# Patient Record
Sex: MALE | URBAN - METROPOLITAN AREA
[De-identification: names, ages, dates, MRNs, and addresses within clinical notes are randomized per-mention and may not be internally consistent; named-entity substitution may affect disease eponyms.]

---

## 2024-10-30 ENCOUNTER — HOSPITAL ENCOUNTER (INPATIENT)
Age: 29
LOS: 1 days | Discharge: ANOTHER HOSPITAL | DRG: 947 | End: 2024-10-31
Attending: STUDENT IN AN ORGANIZED HEALTH CARE EDUCATION/TRAINING PROGRAM | Admitting: INTERNAL MEDICINE
Payer: COMMERCIAL

## 2024-10-30 ENCOUNTER — CLINICAL DOCUMENTATION (OUTPATIENT)
Dept: PHYSICAL THERAPY | Age: 29
End: 2024-10-30

## 2024-10-30 ENCOUNTER — CLINICAL DOCUMENTATION (OUTPATIENT)
Dept: REHABILITATION | Age: 29
End: 2024-10-30

## 2024-10-30 PROBLEM — A41.9 SHOCK, SEPTIC (HCC): Status: ACTIVE | Noted: 2024-10-30

## 2024-10-30 PROBLEM — R65.21 SHOCK, SEPTIC (HCC): Status: ACTIVE | Noted: 2024-10-30

## 2024-10-30 LAB
CREAT SERPL-MCNC: 1 MG/DL (ref 0.9–1.3)
GFR SERPLBLD CREATININE-BSD FMLA CKD-EPI: >90 ML/MIN/{1.73_M2}

## 2024-10-30 PROCEDURE — 6370000000 HC RX 637 (ALT 250 FOR IP): Performed by: STUDENT IN AN ORGANIZED HEALTH CARE EDUCATION/TRAINING PROGRAM

## 2024-10-30 PROCEDURE — G0378 HOSPITAL OBSERVATION PER HR: HCPCS

## 2024-10-30 PROCEDURE — 36415 COLL VENOUS BLD VENIPUNCTURE: CPT

## 2024-10-30 PROCEDURE — 82565 ASSAY OF CREATININE: CPT

## 2024-10-30 PROCEDURE — 1280000000 HC REHAB R&B

## 2024-10-30 PROCEDURE — 93005 ELECTROCARDIOGRAM TRACING: CPT | Performed by: STUDENT IN AN ORGANIZED HEALTH CARE EDUCATION/TRAINING PROGRAM

## 2024-10-30 RX ORDER — ZOLPIDEM TARTRATE 5 MG/1
5 TABLET ORAL NIGHTLY PRN
Status: ON HOLD | COMMUNITY
End: 2024-10-31 | Stop reason: CLARIF

## 2024-10-30 RX ORDER — ESCITALOPRAM OXALATE 20 MG/1
20 TABLET ORAL DAILY
COMMUNITY

## 2024-10-30 RX ORDER — ENOXAPARIN SODIUM 100 MG/ML
40 INJECTION SUBCUTANEOUS DAILY
Status: DISCONTINUED | OUTPATIENT
Start: 2024-10-31 | End: 2024-10-31 | Stop reason: HOSPADM

## 2024-10-30 RX ORDER — FOLIC ACID 1 MG/1
1 TABLET ORAL DAILY
Status: DISCONTINUED | OUTPATIENT
Start: 2024-10-31 | End: 2024-10-31 | Stop reason: HOSPADM

## 2024-10-30 RX ORDER — PROPRANOLOL HCL 20 MG
10 TABLET ORAL 2 TIMES DAILY
Status: DISCONTINUED | OUTPATIENT
Start: 2024-10-30 | End: 2024-10-31 | Stop reason: HOSPADM

## 2024-10-30 RX ORDER — ACETAMINOPHEN 325 MG/1
650 TABLET ORAL EVERY 4 HOURS PRN
Status: DISCONTINUED | OUTPATIENT
Start: 2024-10-30 | End: 2024-10-31 | Stop reason: HOSPADM

## 2024-10-30 RX ORDER — BUSPIRONE HYDROCHLORIDE 5 MG/1
5 TABLET ORAL 3 TIMES DAILY
Status: DISCONTINUED | OUTPATIENT
Start: 2024-10-30 | End: 2024-10-31 | Stop reason: HOSPADM

## 2024-10-30 RX ORDER — SENNA AND DOCUSATE SODIUM 50; 8.6 MG/1; MG/1
2 TABLET, FILM COATED ORAL NIGHTLY
Status: DISCONTINUED | OUTPATIENT
Start: 2024-10-30 | End: 2024-10-31 | Stop reason: HOSPADM

## 2024-10-30 RX ORDER — MULTIVITAMIN WITH IRON
1 TABLET ORAL DAILY
Status: DISCONTINUED | OUTPATIENT
Start: 2024-10-31 | End: 2024-10-31 | Stop reason: HOSPADM

## 2024-10-30 RX ORDER — SIMETHICONE 80 MG
80 TABLET,CHEWABLE ORAL EVERY 6 HOURS PRN
Status: DISCONTINUED | OUTPATIENT
Start: 2024-10-30 | End: 2024-10-31 | Stop reason: HOSPADM

## 2024-10-30 RX ORDER — ESCITALOPRAM OXALATE 10 MG/1
20 TABLET ORAL DAILY
Status: DISCONTINUED | OUTPATIENT
Start: 2024-10-31 | End: 2024-10-31 | Stop reason: HOSPADM

## 2024-10-30 RX ORDER — VALSARTAN 80 MG/1
40 TABLET ORAL DAILY
Status: DISCONTINUED | OUTPATIENT
Start: 2024-10-31 | End: 2024-10-31 | Stop reason: HOSPADM

## 2024-10-30 RX ORDER — BISACODYL 10 MG
10 SUPPOSITORY, RECTAL RECTAL DAILY PRN
Status: DISCONTINUED | OUTPATIENT
Start: 2024-10-30 | End: 2024-10-31 | Stop reason: HOSPADM

## 2024-10-30 RX ORDER — VALSARTAN 80 MG/1
80 TABLET ORAL DAILY
Status: ON HOLD | COMMUNITY
End: 2024-10-31 | Stop reason: CLARIF

## 2024-10-30 RX ORDER — POLYETHYLENE GLYCOL 3350 17 G/17G
17 POWDER, FOR SOLUTION ORAL DAILY
Status: DISCONTINUED | OUTPATIENT
Start: 2024-10-30 | End: 2024-10-31 | Stop reason: HOSPADM

## 2024-10-30 RX ORDER — ACETAMINOPHEN 650 MG
TABLET, EXTENDED RELEASE ORAL PRN
Status: DISCONTINUED | OUTPATIENT
Start: 2024-10-30 | End: 2024-10-31 | Stop reason: HOSPADM

## 2024-10-30 RX ORDER — LANOLIN ALCOHOL/MO/W.PET/CERES
100 CREAM (GRAM) TOPICAL DAILY
Status: DISCONTINUED | OUTPATIENT
Start: 2024-10-31 | End: 2024-10-31 | Stop reason: HOSPADM

## 2024-10-30 RX ADMIN — BUSPIRONE HYDROCHLORIDE 5 MG: 5 TABLET ORAL at 19:49

## 2024-10-30 RX ADMIN — PROPRANOLOL HYDROCHLORIDE 10 MG: 20 TABLET ORAL at 20:57

## 2024-10-30 RX ADMIN — SENNOSIDES, DOCUSATE SODIUM 2 TABLET: 50; 8.6 TABLET, FILM COATED ORAL at 19:49

## 2024-10-30 RX ADMIN — BUSPIRONE HYDROCHLORIDE 5 MG: 5 TABLET ORAL at 18:21

## 2024-10-30 NOTE — PROGRESS NOTES
4 Eyes Skin Assessment     NAME:  Jm Snow  YOB: 1995  MEDICAL RECORD NUMBER:  [id number]    The patient is being assessed for  Admission    I agree that at least one RN has performed a thorough Head to Toe Skin Assessment on the patient. ALL assessment sites listed below have been assessed.      Areas assessed by both nurses:    Head, Face, Ears, Shoulders, Back, Chest, Arms, Elbows, Hands, Sacrum. Buttock, Coccyx, Ischium, and Legs. Feet and Heels        Does the Patient have a Wound? Yes wound(s) were present on assessment. LDA wound assessment was Initiated and completed by RN excoriation to ABD groin and buttock. General red rash, below R shin scab, LFA old IV site bleeding with pressure bandage on,  Dressings to R neck R chest s/p IV removal.        Naveen Prevention initiated by RN: Yes  Wound Care Orders initiated by RN: Yes    Pressure Injury (Stage 3,4, Unstageable, DTI, NWPT, and Complex wounds) if present, place Wound referral order by RN under : Yes unstageables to left foot digits 1,2,&4    New Ostomies, if present place, Ostomy referral order under : No     Nurse 1 eSignature: Electronically signed by Elena Berger RN on 10/30/24 at 5:40 PM EDT    **SHARE this note so that the co-signing nurse can place an eSignature**    Nurse 2 eSignature: Electronically signed by Abimbola Flaherty RN on 10/30/24 at 6:29 PM EDT

## 2024-10-30 NOTE — PROGRESS NOTES
Admission Period/Goal QM Codes for Jm Snow.    QM Admit Code Goal Code   Eating     Oral Hygiene     Toileting Hygiene     Shower/Bathing     UB Dressing     LB Dressing     Putting on/off Footwear     Rolling Left and Right     Sit To Lying     Lying to Sitting on Bedside     Sit to Stand     Chair/Bed to Chair Transfer     Toilet Transfers     Car Transfers     Walk 10 Feet     Walk 50 Feet with Two Turns     Walk 150 Feet     Walk 10 Feet on Uneven Surfaces     1 Step (Curb)     4 Steps     12 Steps     Picking up Object from Floor     Wheel 50 Feet with 2 Turns     Type     Wheel 150 Feet     Type         The above codes were determined by the treatment team to be the patient's accurate admission assessment codes based on assessment performed soon after the patient's admission and prior to the benefit of services provided by staff, or if appropriate, the patient's usual performance at admission.    OT:       PT:       RN:       ST:       :

## 2024-10-30 NOTE — PROGRESS NOTES
Patient was admitted to room 4904 at 1600.  Patient was oriented to the Call Light, Phone, TV, Thermostat, Bed Controls, Bathroom and Emergency Cord.  Patient verbalized and demonstrated understanding of all.  Patient was also given an overview of Unit Routines for Acute Rehab, including the patient's rights and responsibilities as well as the CMS IRF [place] Privacy Act Statement providing notice of data collection.  Patient states that their normal bowel regime is 3X's a week . Meal times were explained, including how to order food.  The white board, (which is posted on the wall by the door is used for communication) has the Therapy Scheduled that is posted each day along with the name of your doctor, nurse, and therapist for your convenience.  We recommend any family that will be care givers or any care givers the patient has, take part in therapy.  We have no set visiting hours, we suggest non-caregiver friends and family visitors come after therapy (at 4 PM or later) to allow patient to rest in between sessions.      In conjunction with the patient and patient’s family, this nurse worked to establish a tailored Fall TIPS plan to ensure patient safety and compliance:    Falls TIPS Completion    Patient identified as increased risk for harm if fall:  [x] Yes     Fall Risks  History of Falls:    [x] Yes   Medication Side Effects:   [x] Yes   Walking Aid:    [x] Yes   IV Pole or Equipment:   [] Yes   Unsteady Walk:     [x] Yes   May Forget or Choose Not to Call: [x] Yes     Fall Interventions   Communicate Recent Fall and/or Risk of Harm: [x] Yes   Walking Aids:  Crutches: [] Yes   Cane: [] Yes   Walker: [] Yes   IV Assistance When Walking: [] Yes   Toileting Schedule: Every 2 Hours  Bedpan:   [x] Yes   Assist to Commode: [] Yes   Assist to Bathroom: [] Yes   Bed Alarm On: [x] Yes   Assistance Out of Bed:  Bedrest: [] Yes   1 Person: [] Yes   2 People: [x] Yes

## 2024-10-30 NOTE — PLAN OF CARE
ARU PATIENT TREATMENT PLAN  [address] [address]  [phone]      Jm Snow    : 1995  [facility] #: [id number]  MRN: [id number]  PHYSICIAN:  Chinedu Rosas MD  Primary Problem    Patient Active Problem List   Diagnosis    Shock, septic (HCC)       Rehabilitation Diagnosis:  ***    ADMIT DATE:10/30/2024    Patient Goals: ***  Admitting Impairments: ***  Activities: ***  Participation: Prior to admission patient was living at home ***   CARE PLAN     NURSING:  Jm Sanderstner while on this unit will:  [x] Be continent of bowel and bladder     [x] Have an adequate number of bowel movements  [x] Urinate with no urinary retention >300ml in bladder  [] Complete bladder protocol with martinez removal  [] Maintain O2 SATs at ___%  [x] Have pain managed while on ARU       [x] Be pain free by discharge   [x] Have no skin breakdown while on ARU  [x] Have improved skin integrity via wound measurements  [x] Have no signs/symptoms of infection at the wound site  [x] Be free from injury during hospitalization   [x] Complete education with patient/family with understanding demonstrated for:  [] Adjustment   [] Other:     Nursing Interventions will include:  [x] bowel/bladder training   [x] education for medical assistive devices   [x] medication education   [] O2 saturation management   [x] energy conservation   [x] stress management techniques   [x] fall prevention   [x] alarms protocol   [x] seating and positioning   [x] skin/wound care   [x] pressure relief instruction   [x] dressing changes     [x] infection protection   [x] DVT prophylaxis  [x] assistance with in room safety with transfers to bed, toilet, wheelchair, shower   [x] bathroom activities and hygiene  [] Other:    Patient/Caregiver Education for:  [x] Disease/sustained injury/management     [x] Medication Use  [x] Surgical intervention  [x] Safety  [x] Body mechanics and or joint protection  [x] Health  on dietary upgrades.  Neuropsychology/Psychology may evaluate and provide necessary support.    Medical issues being managed closely and that require 24 hour availability of a physician:  [] Swallowing Precautions  [] Bowel/Bladder Fx  [] Weight bearing precautions  [] Wound Care    [] Pain Mgmt   [] Infection Protection  [] DVT Prophylaxis   [] Fall Precautions  [] Fluid/Electrolyte/Nutrition Balance  [] Voice Protection   [] Respiratory  [] Other:    Medical Prognosis: [] Good  [] Fair    [] Guarded   Total expected IRF days: ***  Anticipated discharge destination: ***  [] Home Independently   [] Home with supervision    []SNF     [] Other                                           Physician anticipated functional outcomes:  By discharge, the patient will progress to being ***   IPOC brief synthesis: ***      I have reviewed this initial plan of care and agree with its contents:    Title   Name    Date    Time    Physician:     Case Mgmt:    OT:     PT:    RN: Elena Berger RN 10/30/24 5479    ST:    :    Other:

## 2024-10-30 NOTE — PROGRESS NOTES
ARU Admission Assessment    Ethnicity  \"Are you of , /a, or Austrian origin?\"  Check all that apply:  [x] A.  No, not of , /a, or Austrian Origin  [] B.  Yes, Palestinian, Palestinian American, Chicano/a  [] C.  Yes, Gambian  [] D.  Yes, Ervin  [] E.  Yes, another , , or Austrian origin  [] X.  Patient unable to respond  [] Y.  Patient declines to respond    Race  \"What is your race?\"  Check all that apply:  [x] A.  White  [] B.  Black or   [] C.   or   [] D.   Lebanese  [] E.  Chinese  [] F.  Comoran  [] G. Omani  [] H.  Luxembourgish  [] I.  English  [] J.  Other   [] K.    [] L.  Citizen of [place] or [place]  [] M.  Argentine  [] N.  Other   [] X.  Patient unable to respond  [] Y.  Patient declines to respond  [] Z.  None of the above    Language  A.  \"What is your preferred language?\"   English    B.  \"Do you need or want an  to communicate with a doctor or health care staff?\"  Check only one:  [x] 0.  No  [] 1.  Yes  [] 9.  Unable to determine    Transportation  \"Has lack of transportation kept you from medical appointments, meetings, work, or from getting things needed for daily living?\"Check all that apply:  [] A.  Yes, it has kept me from medical appointments or from getting my medications  [] B.  Yes, it has kept me from non-medical meetings, appointments, work, or from getting things that I need  [x] C.  No  [] X.  Patient unable to respond  [] Y.  Patient declines to respond    Hearing  Ability to hear (with hearing aid or hearing appliances if normally used)  [x]  0.  Adequate - no difficulty in normal conversation, social interaction, listening to TV  []  1.  Minimal difficulty - difficulty in some environments (e.g. when person speaks softly or setting is noisy)  []  2.  Moderate difficulty - speaker has to increase volume and speak distinctly   []  3.  Highly impaired - absence of  useful hearing    Vision  Ability to see in adequate light (with glasses or other visual appliances)  [x]  0.  Adequate - sees fine detail, such as regular print in newspapers/books  []  1.  Impaired - sees large print, but not regular print in newspapers/books  []  2.  Moderately impaired - limited vision; not able to see newspaper headlines but can identify objects  []  3.  Highly impaired - object identification in question, but eyes appear to follow objects  []  4.  Severely impaired - no vision or sees only light, colors, or shapes; eyes do not appear to follow objects    Health Literacy  \"How often do you need to have someone help you when you read instructions, pamphlets, or other written material from your doctor or pharmacy?\"  [x]  0.  Never  []  1.  Rarely  []  2.  Sometimes  []  3.  Often  []  4.  Always  []  7.  Patient declines to respond  []  8.  Patient unable to respond    BIMS - **Must be completed in the flowsheet at admission prior to proceeding with Delirium Assessment**  [x] BIMS completed in flowsheet at admission  [] BIMS not completed due to patient unable to give appropriate related answers, see Staff Assessment in flowsheet    Signs and Symptoms of Delirium  A.  Acute Onset Mental Status Change - Is there evidence of an acute change in mental status from the patient's baseline?  [x] 0.  No  [] 1.  Yes    B.  Inattention - Did the patient have difficulty focusing attention, for example being easily distractible or having difficulty keeping track of what was being said?  [x]  0.  Behavior not present  []  1.  Behavior continuously present, does not fluctuate  []  2.  Behavior present, fluctuates (comes and goes, changes in severity)    C.  Disorganized thinking - Was the patient's thinking disorganized or incoherent (rambling or irrelevant conversation, unclear or illogical flow of ideas, or unpredictable switching from subject to subject)?  [x]  0.  Behavior not present  []  1.  Behavior  the above    High Risk Drug Classes:  Use and Indication    Is taking: Check if the pt is taking any medications by pharmacological classification, not how it is used, in the following classes  Indication noted: If column 1 is checked, check if there is an indication noted for all meds in the drug class Is taking  (check all that apply) Indication noted (check all that apply)   Antipsychotic [] []   Anticoagulant [x] [x]   Antibiotic [] []   Opioid [] []   Antiplatelet [] []   Hypoglycemic (including insulin) [] []   None of the above []     Special Treatments, Procedures, and Programs    Check all of the following treatments, procedures, and programs that apply on admission. On admission (check all that apply)   Cancer Treatments   A1. Chemotherapy []           A2. IV []           A3. Oral []           A10. Other []   B1. Radiation []   Respiratory Therapies   C1. Oxygen Therapy []           C2. Continuous (continuously for at least 14 hours per day) []           C3. Intermittent []           C4. High-concentration []   D1. Suctioning (Does not include oral suctioning) []           D2. Scheduled []           D3. As needed []   E1. Tracheostomy Care []   F1. Invasive Mechanical Ventilator (ventilator or respirator) []   G1. Non-invasive Mechanical Ventilator []           G2. BiPAP []           G3. CPAP []   Other   H1. IV Medications (Do not include sub Q pumps, flushes, Dextrose 50% or lactated ringers) []           H2. Vasoactive medications []           H3. Antibiotics []           H4. Anticoagulation []           H10. Other []   I1. Transfusions []   J1. Dialysis []           J2. Hemodialysis []           J3. Peritoneal dialysis []   O1. IV access (including a catheter in a vein) []           O2. Peripheral []           O3. Midline []           O4. Central (PICC, tunneled, port) []      None of the above (select if no Cancer, Respiratory, or Other boxes are checked) [x]     The above items have been reviewed and  updated as necessary, and are accurate for the admission assessment period.    Assessing/Reviewing RN:Elena Berger RN 10/30/24    Assessing/Reviewing RN: Karian Garcia

## 2024-10-31 ENCOUNTER — HOSPITAL ENCOUNTER (INPATIENT)
Age: 29
LOS: 7 days | Discharge: NURSING, REHAB OR LONG-TERM CARE | DRG: 871 | End: 2024-11-07
Attending: INTERNAL MEDICINE | Admitting: INTERNAL MEDICINE
Payer: COMMERCIAL

## 2024-10-31 ENCOUNTER — APPOINTMENT (OUTPATIENT)
Age: 29
DRG: 871 | End: 2024-10-31
Attending: INTERNAL MEDICINE
Payer: COMMERCIAL

## 2024-10-31 ENCOUNTER — APPOINTMENT (OUTPATIENT)
Dept: GENERAL RADIOLOGY | Age: 29
DRG: 871 | End: 2024-10-31
Attending: INTERNAL MEDICINE
Payer: COMMERCIAL

## 2024-10-31 ENCOUNTER — APPOINTMENT (OUTPATIENT)
Dept: CT IMAGING | Age: 29
DRG: 871 | End: 2024-10-31
Attending: INTERNAL MEDICINE
Payer: COMMERCIAL

## 2024-10-31 ENCOUNTER — APPOINTMENT (OUTPATIENT)
Dept: VASCULAR LAB | Age: 29
DRG: 871 | End: 2024-10-31
Attending: INTERNAL MEDICINE
Payer: COMMERCIAL

## 2024-10-31 ENCOUNTER — APPOINTMENT (OUTPATIENT)
Dept: GENERAL RADIOLOGY | Age: 29
DRG: 947 | End: 2024-10-31
Attending: STUDENT IN AN ORGANIZED HEALTH CARE EDUCATION/TRAINING PROGRAM
Payer: COMMERCIAL

## 2024-10-31 ENCOUNTER — CLINICAL DOCUMENTATION (OUTPATIENT)
Dept: PHYSICAL THERAPY | Age: 29
End: 2024-10-31

## 2024-10-31 VITALS
RESPIRATION RATE: 20 BRPM | HEIGHT: 76 IN | HEART RATE: 115 BPM | WEIGHT: 248.68 LBS | OXYGEN SATURATION: 93 % | BODY MASS INDEX: 30.28 KG/M2 | DIASTOLIC BLOOD PRESSURE: 79 MMHG | TEMPERATURE: 101.9 F | SYSTOLIC BLOOD PRESSURE: 125 MMHG

## 2024-10-31 DIAGNOSIS — M79.89 SWELLING OF LOWER EXTREMITY: ICD-10-CM

## 2024-10-31 DIAGNOSIS — R06.02 SHORTNESS OF BREATH: Primary | ICD-10-CM

## 2024-10-31 PROBLEM — E66.3 OVERWEIGHT (BMI 25.0-29.9): Status: ACTIVE | Noted: 2024-10-31

## 2024-10-31 PROBLEM — F14.10 COCAINE ABUSE (HCC): Status: ACTIVE | Noted: 2024-10-31

## 2024-10-31 PROBLEM — A41.9 SEPSIS (HCC): Status: ACTIVE | Noted: 2024-10-31

## 2024-10-31 PROBLEM — J96.01 ACUTE RESPIRATORY FAILURE WITH HYPOXIA: Status: ACTIVE | Noted: 2024-10-31

## 2024-10-31 PROBLEM — M87.059 AVASCULAR NECROSIS OF BONE OF HIP: Status: ACTIVE | Noted: 2024-10-31

## 2024-10-31 PROBLEM — F10.21 HISTORY OF ALCOHOLISM (HCC): Status: ACTIVE | Noted: 2024-10-31

## 2024-10-31 PROBLEM — J18.9 MULTIFOCAL PNEUMONIA: Status: ACTIVE | Noted: 2024-10-31

## 2024-10-31 LAB
ALBUMIN SERPL-MCNC: 2.6 G/DL (ref 3.4–5)
ALBUMIN/GLOB SERPL: 1 {RATIO} (ref 1.1–2.2)
ALP SERPL-CCNC: 106 U/L (ref 40–129)
ALT SERPL-CCNC: 23 U/L (ref 10–40)
ANION GAP SERPL CALCULATED.3IONS-SCNC: 14 MMOL/L (ref 3–16)
ANION GAP SERPL CALCULATED.3IONS-SCNC: 14 MMOL/L (ref 3–16)
ANTI-XA UNFRAC HEPARIN: <0.1 IU/ML (ref 0.3–0.7)
ANTI-XA UNFRAC HEPARIN: <0.1 IU/ML (ref 0.3–0.7)
APTT BLD: 30 SEC (ref 22.1–36.4)
AST SERPL-CCNC: 27 U/L (ref 15–37)
BACTERIA URNS QL MICRO: ABNORMAL /HPF
BASE EXCESS BLDV CALC-SCNC: -1 MMOL/L (ref -3–3)
BASE EXCESS BLDV CALC-SCNC: 0.6 MMOL/L (ref -3–3)
BASOPHILS # BLD: 0 K/UL (ref 0–0.2)
BASOPHILS NFR BLD: 1 %
BILIRUB SERPL-MCNC: 0.6 MG/DL (ref 0–1)
BILIRUB UR QL STRIP.AUTO: NEGATIVE
BUN SERPL-MCNC: 22 MG/DL (ref 7–20)
BUN SERPL-MCNC: 22 MG/DL (ref 7–20)
C DIFF TOX A+B STL QL IA: NORMAL
CALCIUM SERPL-MCNC: 7.6 MG/DL (ref 8.3–10.6)
CALCIUM SERPL-MCNC: 7.9 MG/DL (ref 8.3–10.6)
CHLORIDE SERPL-SCNC: 104 MMOL/L (ref 99–110)
CHLORIDE SERPL-SCNC: 105 MMOL/L (ref 99–110)
CLARITY UR: CLEAR
CO2 BLDV-SCNC: 49 MMOL/L
CO2 BLDV-SCNC: 53 MMOL/L
CO2 SERPL-SCNC: 20 MMOL/L (ref 21–32)
CO2 SERPL-SCNC: 22 MMOL/L (ref 21–32)
COHGB MFR BLDV: 4.4 % (ref 0–1.5)
COHGB MFR BLDV: 5.3 % (ref 0–1.5)
COLOR UR: YELLOW
CREAT SERPL-MCNC: 0.9 MG/DL (ref 0.9–1.3)
CREAT SERPL-MCNC: 1 MG/DL (ref 0.9–1.3)
CRP SERPL-MCNC: 72.7 MG/L (ref 0–5.1)
DEPRECATED RDW RBC AUTO: 15.5 % (ref 12.4–15.4)
DEPRECATED RDW RBC AUTO: 15.9 % (ref 12.4–15.4)
EOSINOPHIL # BLD: 0.3 K/UL (ref 0–0.6)
EOSINOPHIL NFR BLD: 10.3 %
EPI CELLS #/AREA URNS AUTO: 3 /HPF (ref 0–5)
ERYTHROCYTE [SEDIMENTATION RATE] IN BLOOD BY WESTERGREN METHOD: 20 MM/HR (ref 0–15)
GFR SERPLBLD CREATININE-BSD FMLA CKD-EPI: >90 ML/MIN/{1.73_M2}
GFR SERPLBLD CREATININE-BSD FMLA CKD-EPI: >90 ML/MIN/{1.73_M2}
GLUCOSE BLD-MCNC: 102 MG/DL (ref 70–99)
GLUCOSE BLD-MCNC: 96 MG/DL (ref 70–99)
GLUCOSE SERPL-MCNC: 80 MG/DL (ref 70–99)
GLUCOSE SERPL-MCNC: 89 MG/DL (ref 70–99)
GLUCOSE UR STRIP.AUTO-MCNC: NEGATIVE MG/DL
HCO3 BLDV-SCNC: 20.9 MMOL/L (ref 23–29)
HCO3 BLDV-SCNC: 22.9 MMOL/L (ref 23–29)
HCT VFR BLD AUTO: 25 % (ref 40.5–52.5)
HCT VFR BLD AUTO: 29 % (ref 40.5–52.5)
HGB BLD-MCNC: 7.8 G/DL (ref 13.5–17.5)
HGB BLD-MCNC: 9 G/DL (ref 13.5–17.5)
HGB UR QL STRIP.AUTO: NEGATIVE
HYALINE CASTS #/AREA URNS AUTO: 11 /LPF (ref 0–8)
HYALINE CASTS: PRESENT
INR PPP: 1.27 (ref 0.85–1.15)
KETONES UR STRIP.AUTO-MCNC: ABNORMAL MG/DL
LACTATE BLDV-SCNC: 0.8 MMOL/L (ref 0.4–2)
LACTATE BLDV-SCNC: 2.1 MMOL/L (ref 0.4–2)
LEUKOCYTE ESTERASE UR QL STRIP.AUTO: ABNORMAL
LYMPHOCYTES # BLD: 0.4 K/UL (ref 1–5.1)
LYMPHOCYTES NFR BLD: 16.4 %
MAGNESIUM SERPL-MCNC: 1.44 MG/DL (ref 1.8–2.4)
MCH RBC QN AUTO: 29.2 PG (ref 26–34)
MCH RBC QN AUTO: 29.3 PG (ref 26–34)
MCHC RBC AUTO-ENTMCNC: 31.1 G/DL (ref 31–36)
MCHC RBC AUTO-ENTMCNC: 31.3 G/DL (ref 31–36)
MCV RBC AUTO: 93.7 FL (ref 80–100)
MCV RBC AUTO: 94.1 FL (ref 80–100)
METHGB MFR BLDV: 0.4 %
METHGB MFR BLDV: 0.9 %
MONOCYTES # BLD: 0.4 K/UL (ref 0–1.3)
MONOCYTES NFR BLD: 14 %
MRSA DNA SPEC QL NAA+PROBE: NORMAL
NEUTROPHILS # BLD: 1.5 K/UL (ref 1.7–7.7)
NEUTROPHILS NFR BLD: 58.3 %
NITRITE UR QL STRIP.AUTO: NEGATIVE
NT-PROBNP SERPL-MCNC: 520 PG/ML (ref 0–124)
O2 CT VFR BLDV CALC: 11 VOL %
O2 CT VFR BLDV CALC: 12 VOL %
O2 THERAPY: ABNORMAL
O2 THERAPY: ABNORMAL
PCO2 BLDV: 24.9 MMHG (ref 40–50)
PCO2 BLDV: 27.4 MMHG (ref 40–50)
PERFORMED ON: ABNORMAL
PERFORMED ON: NORMAL
PH BLDV: 7.53 [PH] (ref 7.35–7.45)
PH BLDV: 7.53 [PH] (ref 7.35–7.45)
PH UR STRIP.AUTO: 5.5 [PH] (ref 5–8)
PHOSPHATE SERPL-MCNC: 3.9 MG/DL (ref 2.5–4.9)
PLATELET # BLD AUTO: 219 K/UL (ref 135–450)
PLATELET # BLD AUTO: 304 K/UL (ref 135–450)
PMV BLD AUTO: 8.9 FL (ref 5–10.5)
PMV BLD AUTO: 9.2 FL (ref 5–10.5)
PO2 BLDV: 140 MMHG (ref 25–40)
PO2 BLDV: 172 MMHG (ref 25–40)
POTASSIUM SERPL-SCNC: 3.1 MMOL/L (ref 3.5–5.1)
POTASSIUM SERPL-SCNC: 3.4 MMOL/L (ref 3.5–5.1)
PROCALCITONIN SERPL IA-MCNC: 0.92 NG/ML (ref 0–0.15)
PROT SERPL-MCNC: 5.3 G/DL (ref 6.4–8.2)
PROT UR STRIP.AUTO-MCNC: ABNORMAL MG/DL
PROTHROMBIN TIME: 16.1 SEC (ref 11.9–14.9)
RBC # BLD AUTO: 2.67 M/UL (ref 4.2–5.9)
RBC # BLD AUTO: 3.09 M/UL (ref 4.2–5.9)
RBC CLUMPS #/AREA URNS AUTO: 2 /HPF (ref 0–4)
SAO2 % BLDV: 100 %
SAO2 % BLDV: 100 %
SODIUM SERPL-SCNC: 139 MMOL/L (ref 136–145)
SODIUM SERPL-SCNC: 140 MMOL/L (ref 136–145)
SP GR UR STRIP.AUTO: 1.01 (ref 1–1.03)
T3 SERPL-MCNC: 0.72 NG/ML (ref 0.8–2)
T4 FREE SERPL-MCNC: 1 NG/DL (ref 0.9–1.8)
T4 SERPL-MCNC: 6.1 UG/DL (ref 4.5–10.9)
TROPONIN, HIGH SENSITIVITY: 91 NG/L (ref 0–22)
TROPONIN, HIGH SENSITIVITY: 94 NG/L (ref 0–22)
TSH SERPL DL<=0.005 MIU/L-ACNC: 12.3 UIU/ML (ref 0.27–4.2)
UA COMPLETE W REFLEX CULTURE PNL UR: ABNORMAL
UA DIPSTICK W REFLEX MICRO PNL UR: YES
URN SPEC COLLECT METH UR: ABNORMAL
UROBILINOGEN UR STRIP-ACNC: 0.2 E.U./DL
VANCOMYCIN SERPL-MCNC: 9.7 UG/ML
WBC # BLD AUTO: 2.6 K/UL (ref 4–11)
WBC # BLD AUTO: 6.1 K/UL (ref 4–11)
WBC #/AREA URNS AUTO: 6 /HPF (ref 0–5)

## 2024-10-31 PROCEDURE — 85610 PROTHROMBIN TIME: CPT

## 2024-10-31 PROCEDURE — 2000000000 HC ICU R&B

## 2024-10-31 PROCEDURE — 80053 COMPREHEN METABOLIC PANEL: CPT

## 2024-10-31 PROCEDURE — 2580000003 HC RX 258: Performed by: INTERNAL MEDICINE

## 2024-10-31 PROCEDURE — 82803 BLOOD GASES ANY COMBINATION: CPT

## 2024-10-31 PROCEDURE — 71045 X-RAY EXAM CHEST 1 VIEW: CPT

## 2024-10-31 PROCEDURE — 6360000002 HC RX W HCPCS: Performed by: INTERNAL MEDICINE

## 2024-10-31 PROCEDURE — 5A0945A ASSISTANCE WITH RESPIRATORY VENTILATION, 24-96 CONSECUTIVE HOURS, HIGH NASAL FLOW/VELOCITY: ICD-10-PCS | Performed by: STUDENT IN AN ORGANIZED HEALTH CARE EDUCATION/TRAINING PROGRAM

## 2024-10-31 PROCEDURE — 87324 CLOSTRIDIUM AG IA: CPT

## 2024-10-31 PROCEDURE — 85027 COMPLETE CBC AUTOMATED: CPT

## 2024-10-31 PROCEDURE — 99291 CRITICAL CARE FIRST HOUR: CPT | Performed by: INTERNAL MEDICINE

## 2024-10-31 PROCEDURE — 87040 BLOOD CULTURE FOR BACTERIA: CPT

## 2024-10-31 PROCEDURE — 94669 MECHANICAL CHEST WALL OSCILL: CPT

## 2024-10-31 PROCEDURE — 96368 THER/DIAG CONCURRENT INF: CPT

## 2024-10-31 PROCEDURE — 83605 ASSAY OF LACTIC ACID: CPT

## 2024-10-31 PROCEDURE — 85025 COMPLETE CBC W/AUTO DIFF WBC: CPT

## 2024-10-31 PROCEDURE — 6360000004 HC RX CONTRAST MEDICATION: Performed by: INTERNAL MEDICINE

## 2024-10-31 PROCEDURE — 74018 RADEX ABDOMEN 1 VIEW: CPT

## 2024-10-31 PROCEDURE — 0202U NFCT DS 22 TRGT SARS-COV-2: CPT

## 2024-10-31 PROCEDURE — 96367 TX/PROPH/DG ADDL SEQ IV INF: CPT

## 2024-10-31 PROCEDURE — 71260 CT THORAX DX C+: CPT

## 2024-10-31 PROCEDURE — G0378 HOSPITAL OBSERVATION PER HR: HCPCS

## 2024-10-31 PROCEDURE — 83735 ASSAY OF MAGNESIUM: CPT

## 2024-10-31 PROCEDURE — 84480 ASSAY TRIIODOTHYRONINE (T3): CPT

## 2024-10-31 PROCEDURE — 51798 US URINE CAPACITY MEASURE: CPT

## 2024-10-31 PROCEDURE — 86140 C-REACTIVE PROTEIN: CPT

## 2024-10-31 PROCEDURE — 85730 THROMBOPLASTIN TIME PARTIAL: CPT

## 2024-10-31 PROCEDURE — 0D9670Z DRAINAGE OF STOMACH WITH DRAINAGE DEVICE, VIA NATURAL OR ARTIFICIAL OPENING: ICD-10-PCS | Performed by: STUDENT IN AN ORGANIZED HEALTH CARE EDUCATION/TRAINING PROGRAM

## 2024-10-31 PROCEDURE — 94761 N-INVAS EAR/PLS OXIMETRY MLT: CPT

## 2024-10-31 PROCEDURE — 84145 PROCALCITONIN (PCT): CPT

## 2024-10-31 PROCEDURE — 85520 HEPARIN ASSAY: CPT

## 2024-10-31 PROCEDURE — 36415 COLL VENOUS BLD VENIPUNCTURE: CPT

## 2024-10-31 PROCEDURE — 81001 URINALYSIS AUTO W/SCOPE: CPT

## 2024-10-31 PROCEDURE — 96366 THER/PROPH/DIAG IV INF ADDON: CPT

## 2024-10-31 PROCEDURE — 84443 ASSAY THYROID STIM HORMONE: CPT

## 2024-10-31 PROCEDURE — 2700000000 HC OXYGEN THERAPY PER DAY

## 2024-10-31 PROCEDURE — 84484 ASSAY OF TROPONIN QUANT: CPT

## 2024-10-31 PROCEDURE — 74177 CT ABD & PELVIS W/CONTRAST: CPT

## 2024-10-31 PROCEDURE — 84439 ASSAY OF FREE THYROXINE: CPT

## 2024-10-31 PROCEDURE — 6370000000 HC RX 637 (ALT 250 FOR IP): Performed by: INTERNAL MEDICINE

## 2024-10-31 PROCEDURE — 6360000002 HC RX W HCPCS: Performed by: STUDENT IN AN ORGANIZED HEALTH CARE EDUCATION/TRAINING PROGRAM

## 2024-10-31 PROCEDURE — 96365 THER/PROPH/DIAG IV INF INIT: CPT

## 2024-10-31 PROCEDURE — 83880 ASSAY OF NATRIURETIC PEPTIDE: CPT

## 2024-10-31 PROCEDURE — 87081 CULTURE SCREEN ONLY: CPT

## 2024-10-31 PROCEDURE — 84100 ASSAY OF PHOSPHORUS: CPT

## 2024-10-31 PROCEDURE — 87103 BLOOD FUNGUS CULTURE: CPT

## 2024-10-31 PROCEDURE — 96375 TX/PRO/DX INJ NEW DRUG ADDON: CPT

## 2024-10-31 PROCEDURE — 80202 ASSAY OF VANCOMYCIN: CPT

## 2024-10-31 PROCEDURE — 85652 RBC SED RATE AUTOMATED: CPT

## 2024-10-31 PROCEDURE — 87641 MR-STAPH DNA AMP PROBE: CPT

## 2024-10-31 PROCEDURE — 87449 NOS EACH ORGANISM AG IA: CPT

## 2024-10-31 PROCEDURE — 93970 EXTREMITY STUDY: CPT

## 2024-10-31 RX ORDER — POLYETHYLENE GLYCOL 3350 17 G/17G
17 POWDER, FOR SOLUTION ORAL 2 TIMES DAILY
Status: ON HOLD | COMMUNITY
End: 2024-11-22 | Stop reason: HOSPADM

## 2024-10-31 RX ORDER — ESCITALOPRAM OXALATE 10 MG/1
20 TABLET ORAL DAILY
Status: DISCONTINUED | OUTPATIENT
Start: 2024-10-31 | End: 2024-11-07 | Stop reason: HOSPADM

## 2024-10-31 RX ORDER — ZOLPIDEM TARTRATE 5 MG/1
5 TABLET ORAL NIGHTLY PRN
Status: DISCONTINUED | OUTPATIENT
Start: 2024-10-31 | End: 2024-11-07

## 2024-10-31 RX ORDER — HEPARIN SODIUM 1000 [USP'U]/ML
40 INJECTION, SOLUTION INTRAVENOUS; SUBCUTANEOUS PRN
Status: DISCONTINUED | OUTPATIENT
Start: 2024-10-31 | End: 2024-11-05

## 2024-10-31 RX ORDER — HEPARIN SODIUM 1000 [USP'U]/ML
80 INJECTION, SOLUTION INTRAVENOUS; SUBCUTANEOUS ONCE
Status: COMPLETED | OUTPATIENT
Start: 2024-10-31 | End: 2024-10-31

## 2024-10-31 RX ORDER — ACETAMINOPHEN 325 MG/1
650 TABLET ORAL EVERY 6 HOURS PRN
Status: DISCONTINUED | OUTPATIENT
Start: 2024-10-31 | End: 2024-11-07 | Stop reason: HOSPADM

## 2024-10-31 RX ORDER — HEPARIN SODIUM 1000 [USP'U]/ML
80 INJECTION, SOLUTION INTRAVENOUS; SUBCUTANEOUS PRN
Status: DISCONTINUED | OUTPATIENT
Start: 2024-10-31 | End: 2024-11-05

## 2024-10-31 RX ORDER — MAGNESIUM SULFATE 1 G/100ML
1000 INJECTION INTRAVENOUS
Status: DISPENSED | OUTPATIENT
Start: 2024-10-31 | End: 2024-10-31

## 2024-10-31 RX ORDER — FOLIC ACID 1 MG/1
1 TABLET ORAL DAILY
Status: ON HOLD | COMMUNITY
End: 2024-11-22

## 2024-10-31 RX ORDER — ACETAMINOPHEN 160 MG/5ML
650 LIQUID ORAL EVERY 4 HOURS PRN
Status: DISCONTINUED | OUTPATIENT
Start: 2024-10-31 | End: 2024-11-05 | Stop reason: SDUPTHER

## 2024-10-31 RX ORDER — VALSARTAN 40 MG/1
80 TABLET ORAL DAILY
Status: DISCONTINUED | OUTPATIENT
Start: 2024-10-31 | End: 2024-11-07 | Stop reason: HOSPADM

## 2024-10-31 RX ORDER — SODIUM CHLORIDE 0.9 % (FLUSH) 0.9 %
10 SYRINGE (ML) INJECTION EVERY 12 HOURS SCHEDULED
Status: DISCONTINUED | OUTPATIENT
Start: 2024-10-31 | End: 2024-11-07 | Stop reason: HOSPADM

## 2024-10-31 RX ORDER — PANTOPRAZOLE SODIUM 40 MG/10ML
40 INJECTION, POWDER, LYOPHILIZED, FOR SOLUTION INTRAVENOUS DAILY
Status: DISCONTINUED | OUTPATIENT
Start: 2024-10-31 | End: 2024-11-06

## 2024-10-31 RX ORDER — HEPARIN SODIUM 10000 [USP'U]/100ML
5-30 INJECTION, SOLUTION INTRAVENOUS CONTINUOUS
Status: DISCONTINUED | OUTPATIENT
Start: 2024-10-31 | End: 2024-11-05

## 2024-10-31 RX ORDER — 0.9 % SODIUM CHLORIDE 0.9 %
1000 INTRAVENOUS SOLUTION INTRAVENOUS ONCE
Status: DISCONTINUED | OUTPATIENT
Start: 2024-10-31 | End: 2024-11-05

## 2024-10-31 RX ORDER — M-VIT,TX,IRON,MINS/CALC/FOLIC 27MG-0.4MG
1 TABLET ORAL DAILY
COMMUNITY

## 2024-10-31 RX ORDER — METRONIDAZOLE 500 MG/100ML
500 INJECTION, SOLUTION INTRAVENOUS EVERY 8 HOURS
Status: DISCONTINUED | OUTPATIENT
Start: 2024-10-31 | End: 2024-10-31

## 2024-10-31 RX ORDER — POTASSIUM CHLORIDE 7.45 MG/ML
10 INJECTION INTRAVENOUS
Status: DISPENSED | OUTPATIENT
Start: 2024-10-31 | End: 2024-10-31

## 2024-10-31 RX ORDER — IOPAMIDOL 755 MG/ML
75 INJECTION, SOLUTION INTRAVASCULAR
Status: COMPLETED | OUTPATIENT
Start: 2024-10-31 | End: 2024-10-31

## 2024-10-31 RX ORDER — ENOXAPARIN SODIUM 100 MG/ML
40 INJECTION SUBCUTANEOUS DAILY
Status: DISCONTINUED | OUTPATIENT
Start: 2024-10-31 | End: 2024-10-31

## 2024-10-31 RX ORDER — SIMETHICONE 80 MG
80 TABLET,CHEWABLE ORAL
Status: ON HOLD | COMMUNITY
End: 2024-11-22 | Stop reason: HOSPADM

## 2024-10-31 RX ORDER — NICOTINE 21 MG/24HR
1 PATCH, TRANSDERMAL 24 HOURS TRANSDERMAL DAILY PRN
Status: DISCONTINUED | OUTPATIENT
Start: 2024-10-31 | End: 2024-11-07 | Stop reason: HOSPADM

## 2024-10-31 RX ORDER — SODIUM CHLORIDE 9 MG/ML
INJECTION, SOLUTION INTRAVENOUS PRN
Status: DISCONTINUED | OUTPATIENT
Start: 2024-10-31 | End: 2024-11-07 | Stop reason: HOSPADM

## 2024-10-31 RX ORDER — PROMETHAZINE HYDROCHLORIDE 25 MG/1
12.5 TABLET ORAL EVERY 6 HOURS PRN
Status: DISCONTINUED | OUTPATIENT
Start: 2024-10-31 | End: 2024-11-07 | Stop reason: HOSPADM

## 2024-10-31 RX ORDER — POTASSIUM CHLORIDE 7.45 MG/ML
10 INJECTION INTRAVENOUS
Status: DISCONTINUED | OUTPATIENT
Start: 2024-10-31 | End: 2024-10-31

## 2024-10-31 RX ORDER — ONDANSETRON 2 MG/ML
4 INJECTION INTRAMUSCULAR; INTRAVENOUS EVERY 6 HOURS PRN
Status: DISCONTINUED | OUTPATIENT
Start: 2024-10-31 | End: 2024-11-07 | Stop reason: HOSPADM

## 2024-10-31 RX ORDER — LEVOFLOXACIN 5 MG/ML
750 INJECTION, SOLUTION INTRAVENOUS EVERY 24 HOURS
Status: DISCONTINUED | OUTPATIENT
Start: 2024-10-31 | End: 2024-11-05

## 2024-10-31 RX ORDER — VANCOMYCIN HCL IN 5 % DEXTROSE 1.25 G/25
1250 PLASTIC BAG, INJECTION (ML) INTRAVENOUS EVERY 8 HOURS
Status: DISCONTINUED | OUTPATIENT
Start: 2024-10-31 | End: 2024-10-31

## 2024-10-31 RX ORDER — 0.9 % SODIUM CHLORIDE 0.9 %
500 INTRAVENOUS SOLUTION INTRAVENOUS ONCE
Status: DISCONTINUED | OUTPATIENT
Start: 2024-10-31 | End: 2024-10-31

## 2024-10-31 RX ORDER — LINEZOLID 2 MG/ML
600 INJECTION, SOLUTION INTRAVENOUS EVERY 12 HOURS
Status: DISCONTINUED | OUTPATIENT
Start: 2024-10-31 | End: 2024-10-31

## 2024-10-31 RX ORDER — ACETAMINOPHEN 650 MG/1
650 SUPPOSITORY RECTAL EVERY 6 HOURS PRN
Status: DISCONTINUED | OUTPATIENT
Start: 2024-10-31 | End: 2024-11-07 | Stop reason: HOSPADM

## 2024-10-31 RX ORDER — THIAMINE MONONITRATE (VIT B1) 100 MG
100 TABLET ORAL DAILY
Status: ON HOLD | COMMUNITY
End: 2024-11-22

## 2024-10-31 RX ORDER — BUSPIRONE HYDROCHLORIDE 5 MG/1
5 TABLET ORAL 3 TIMES DAILY
Status: ON HOLD | COMMUNITY
End: 2024-11-22 | Stop reason: HOSPADM

## 2024-10-31 RX ORDER — LINEZOLID 2 MG/ML
600 INJECTION, SOLUTION INTRAVENOUS EVERY 12 HOURS
Status: DISCONTINUED | OUTPATIENT
Start: 2024-10-31 | End: 2024-11-04

## 2024-10-31 RX ORDER — SODIUM CHLORIDE 0.9 % (FLUSH) 0.9 %
10 SYRINGE (ML) INJECTION PRN
Status: DISCONTINUED | OUTPATIENT
Start: 2024-10-31 | End: 2024-11-07 | Stop reason: HOSPADM

## 2024-10-31 RX ADMIN — POTASSIUM CHLORIDE 10 MEQ: 10 INJECTION, SOLUTION INTRAVENOUS at 05:29

## 2024-10-31 RX ADMIN — Medication 10 ML: at 11:34

## 2024-10-31 RX ADMIN — HEPARIN SODIUM 22 UNITS/KG/HR: 10000 INJECTION, SOLUTION INTRAVENOUS at 23:08

## 2024-10-31 RX ADMIN — PANTOPRAZOLE SODIUM 40 MG: 40 INJECTION, POWDER, FOR SOLUTION INTRAVENOUS at 14:05

## 2024-10-31 RX ADMIN — MAGNESIUM SULFATE HEPTAHYDRATE 1000 MG: 1 INJECTION, SOLUTION INTRAVENOUS at 05:37

## 2024-10-31 RX ADMIN — POTASSIUM CHLORIDE 10 MEQ: 10 INJECTION, SOLUTION INTRAVENOUS at 04:26

## 2024-10-31 RX ADMIN — HEPARIN SODIUM 8800 UNITS: 1000 INJECTION INTRAVENOUS; SUBCUTANEOUS at 18:27

## 2024-10-31 RX ADMIN — MICAFUNGIN SODIUM 100 MG: 100 INJECTION, POWDER, LYOPHILIZED, FOR SOLUTION INTRAVENOUS at 16:38

## 2024-10-31 RX ADMIN — POTASSIUM BICARBONATE 40 MEQ: 782 TABLET, EFFERVESCENT ORAL at 04:04

## 2024-10-31 RX ADMIN — SODIUM CHLORIDE: 9 INJECTION, SOLUTION INTRAVENOUS at 11:33

## 2024-10-31 RX ADMIN — SODIUM CHLORIDE 700 ML: 9 INJECTION, SOLUTION INTRAVENOUS at 04:22

## 2024-10-31 RX ADMIN — LINEZOLID 600 MG: 600 INJECTION, SOLUTION INTRAVENOUS at 14:07

## 2024-10-31 RX ADMIN — Medication 1000 ML: at 15:08

## 2024-10-31 RX ADMIN — IOPAMIDOL 75 ML: 755 INJECTION, SOLUTION INTRAVENOUS at 07:14

## 2024-10-31 RX ADMIN — POTASSIUM CHLORIDE 10 MEQ: 10 INJECTION, SOLUTION INTRAVENOUS at 06:16

## 2024-10-31 RX ADMIN — HEPARIN SODIUM 8800 UNITS: 1000 INJECTION INTRAVENOUS; SUBCUTANEOUS at 11:25

## 2024-10-31 RX ADMIN — CEFEPIME 2000 MG: 2 INJECTION, POWDER, FOR SOLUTION INTRAVENOUS at 04:44

## 2024-10-31 RX ADMIN — Medication 10 ML: at 21:04

## 2024-10-31 RX ADMIN — ACETAMINOPHEN 650 MG: 650 SOLUTION ORAL at 16:05

## 2024-10-31 RX ADMIN — ACETAMINOPHEN 650 MG: 650 SOLUTION ORAL at 20:57

## 2024-10-31 RX ADMIN — LEVOFLOXACIN 750 MG: 750 INJECTION, SOLUTION INTRAVENOUS at 17:19

## 2024-10-31 RX ADMIN — MAGNESIUM SULFATE HEPTAHYDRATE 1000 MG: 1 INJECTION, SOLUTION INTRAVENOUS at 06:35

## 2024-10-31 RX ADMIN — CEFEPIME 2000 MG: 2 INJECTION, POWDER, FOR SOLUTION INTRAVENOUS at 11:34

## 2024-10-31 RX ADMIN — HEPARIN SODIUM 18 UNITS/KG/HR: 10000 INJECTION, SOLUTION INTRAVENOUS at 11:31

## 2024-10-31 RX ADMIN — VANCOMYCIN HYDROCHLORIDE 2000 MG: 1 INJECTION, POWDER, LYOPHILIZED, FOR SOLUTION INTRAVENOUS at 04:13

## 2024-10-31 ASSESSMENT — PAIN SCALES - GENERAL
PAINLEVEL_OUTOF10: 0

## 2024-10-31 NOTE — PROGRESS NOTES
Physical/Occupational Therapy  Jm Snow  1995    PT/OT orders received, chart reviewed. Rapid response called this AM for tachycardia and hypoxia. Patient is being transferred to ICU for further medical management. Will hold therapy evaluations pending medical stability.     Bonnie Fermin PT, DPT [id number]  Yenni Caro, OTR/L [id number]

## 2024-10-31 NOTE — PROGRESS NOTES
Speech Language Pathology  HOLD    Jm Snow  1995    SLP eval and treat orders received. Attempted to see pt for dysphagia evaluation. Per discussion with RN, pt is NPO with NG for decompression and unable to be seen at this time. Will hold evaluation and attempt to follow-up as schedule allows.     Thank you,     Marty Hodges M.A., CCC-SLP [id number]  Speech-Language Pathologist

## 2024-10-31 NOTE — PROGRESS NOTES
ARU Discharge Assessment    Transportation  \"Has lack of transportation kept you from medical appointments, meetings, work, or from getting things needed for daily living?\"Check all that apply:  [] A.  Yes, it has kept me from medical appointments or from getting my medications  [] B.  Yes, it has kept me from non-medical meetings, appointments, work, or from getting things that I need  [] C.  No  [x] X.  Patient unable to respond  [] Y.  Patient declines to respond    Provision of Current Reconciled Medication List to Subsequent Provider at Discharge  [x] No, current reconciled medication list not provided to the subsequent provider.  [] Yes, current reconciled medication list provided to the subsequent provider. (**Select route of transmission below**)   [] Via Electronic Health Record   [] Via Health Information Exchange Organization  [] Verbal (e.g. in person, telephone, video conferencing)  [] Paper-based (e.g. fax, copies, printouts)   [] Other Methods (e.g. texting, email, CDs)    Provision of Current Reconciled Medication List to Patient at Discharge  [x] No, current reconciled medication list not provided to the patient, family and/or caregiver.   [] Yes, current reconciled medication list provided to the patient, family and/or caregiver.  (**Select route of transmission below**)   [] Via Electronic Health Record (e.g., electronic access to patient portal)   [] Via Health Information Exchange Organization  [] Verbal (e.g. in person, telephone, video conferencing)  [] Paper-based (e.g. fax, copies, printouts)   [] Other Methods (e.g. texting, email, CDs)    Health Literacy  \"How often do you need to have someone help you when you read instructions, pamphlets, or other written material from your doctor or pharmacy?\"  [x]  0.  Never  []  1.  Rarely  []  2.  Sometimes  []  3.  Often  []  4.  Always  []  7.  Patient declines to respond  []  8.  Patient unable to respond    BIMS - **Must be completed in the    Antipsychotic [] []   Anticoagulant [x] [x]   Antibiotic [] []   Opioid [] []   Antiplatelet [] []   Hypoglycemic (including insulin) [] []   None of the above []     COVID-19 Vaccination Status:    Is the patient's COVID-19 vaccination status up to date? No, patient is not up to date        Yes, patient is up to date [x]    []             A patient is up to date on their COVID-19 Vaccine if they have received:  1 dose of the 4840-0453 Moderna COVID-19 vaccine OR  1 dose of the [id number] Pfizer-BioNTech COVID-19 vaccine   If not up to date, the patient was offered an up to date COVID-19 vaccine and: Patient declined             Patient requested vaccine, Physician notified        Patient unable to respond    Vaccine currently unavailable, patient directed to PCP/Outpatient Pharmacy to receive vaccine           []    []      [x]    []     Special Treatments, Procedures, and Programs (THROUGHOUT LAST 3 DAYS OF STAY)    Check all of the following treatments, procedures, and programs that apply at discharge. At Discharge (check all that apply)   Cancer Treatments   A1. Chemotherapy []           A2. IV []           A3. Oral []           A10. Other []   B1. Radiation []   Respiratory Therapies   C1. Oxygen Therapy []           C2. Continuous (continuously for at least 14 hours per day) []           C3. Intermittent []           C4. High-concentration []   D1. Suctioning (Does not include oral suctioning) []           D2. Scheduled []           D3. As needed []   E1. Tracheostomy Care []   F1. Invasive Mechanical Ventilator (ventilator or respirator) []   G1. Non-invasive Mechanical Ventilator []           G2. BiPAP []           G3. CPAP []   Other   H1. IV Medications (Do not include sub Q pumps, flushes, Dextrose 50% or lactated ringers) []           H2. Vasoactive medications []           H3. Antibiotics []           H4. Anticoagulation []           H10. Other []   I1. Transfusions []   J1. Dialysis []           J2.  Hemodialysis []           J3. Peritoneal dialysis []   O1. IV access (including a catheter in a vein) []           O2. Peripheral []           O3. Midline []           O4. Central (PICC, tunneled, port) []      None of the above (select if no Cancer, Respiratory, or Other boxes are checked) [x]

## 2024-10-31 NOTE — PROGRESS NOTES
Hospitalist Progress Note    Name:  Jm Snow    /Age/Sex: 1995  (28 y.o. male)  MRN & CSN:  [id number] & [id number]    PCP: No primary care provider on file.    Date of Admission: 10/31/2024    Patient Status:  Inpatient     Chief Complaint: No chief complaint on file.      Hospital Course: Patient was admitted from ARU 10/31 due to fever and recently having been admitted to the ARU after a 5 week hospital stay. He was febrile and tachycardic and did not look good.  He went to   and there was a rapid response right about 7 am.  Patient was dropping oxygen sats and having increased 02 requirement.  He was transferred to [address] [place] and placed on 15 L of high flow oxygen.  At about 830 there was a second rapid response called this time on [address] [place] due to the patient not maintaining his oxygen saturation despite the 15 L of oxygen.  The patient was dropping his sats into the mid 80s.  After his first rapid response a CT chest abdomen pelvis was ordered which revealed bilateral pleural effusions and consolidations as well as the potential of a PE.  The results came back just at the time of the second rapid response.  Based on the patient's increased oxygen requirement we placed the patient on heated high flow oxygen 60 L a minute at 100% FiO2 and transferred him to the ICU.  Additionally with the potential for a pulmonary embolism we started the patient on a heparin drip    Subjective:  Today is:  Hospital Day: 1.  Patient seen and examined in ICU-3916/3916-01.     Patient is awake and alert he does appear to have some tremor and possibly some shivers.  He is refusing to have a Salvador catheter placed he is refusing rectal Tylenol  He continues to spike fevers on ICU        Medications:  Reviewed    Infusion Medications    sodium chloride Stopped (10/31/24 1200)    heparin (PORCINE) Infusion 18 Units/kg/hr (10/31/24 1131)     Scheduled Medications    sodium chloride flush  10 mL IntraVENous 2 times per    clearly artifactual, suggestive of small left upper lobe pulmonary embolus.      Patchy nodularity right upper lobe and right middle lobe suspicious for   pneumonia      Bilateral pleural effusions with adjacent consolidation of the lung bases.   Basilar lung consolidation is either due to atelectasis or pneumonia      Sclerosis of the humeral heads bilaterally suggesting avascular necrosis.      Abdomen and pelvis      Scattered fluid-filled loops of small and large bowel are seen.  No definite   transition point to suggest obstruction.  There is mild abdominal and pelvic   ascites seen, which may relate to fluid overload given the body wall anasarca.      There is suspected avascular necrosis of the femoral heads bilaterally and a   subchondral fracture on the left.      RECOMMENDATIONS:   Results discussed with Dr Steen by Sanjay Qiu MD at 8:03 am on   10/31/2024                 Assessment/Plan:    Active Hospital Problems    Diagnosis     Sepsis ([facility]) [A41.9]     Shortness of breath [R06.02]     Swelling of lower extremity [M79.89]     Cocaine abuse ([facility]) [F14.10]     Overweight (BMI 25.0-29.9) [E66.3]     History of alcoholism ([facility]) [F10.21]     Avascular necrosis of bone of hip [M87.059]     Multifocal pneumonia [J18.9]     Acute respiratory failure with hypoxia [J96.01]     Septic shock ([facility]) [A41.9, R65.21]          Hospital Day: 1    This is a 28 y.o. male who presented to [facility] on 10/31/2024 and is being treated for:    Sepsis  -Source appears to be multifocal pneumonia that is noted on the CT scan.  In addition to some consolidations he also has bilateral pleural effusions.  -Given his recent hospital stay that was lengthy as well as having had fungemia during that hospital stay the patient has been started on broad-spectrum antibiotics as IV antifungals.  Patient is currently on Levaquin, Zyvox and micafungin  The patient does have some lower extremity edema and based off of this we

## 2024-10-31 NOTE — CONSULTS
Infectious Diseases   Consult Note        Admission Date: 10/31/2024  Hospital Day: Hospital Day: 1   Attending: Misael Alonso MD  Date of service: 10/31/24     Reason for admission: Sepsis (HCC) [A41.9]    Chief complaint/ Reason for consult: Septic shock, acute respiratory failure with hypoxia    Microbiology:        I have reviewed allavailable micro lab data and cultures    Results       Procedure Component Value Units Date/Time    Respiratory Panel, Molecular, with COVID-19 (Restricted: peds pts or suitable admitted adults) [[id number]]     Order Status: No result Specimen: Nasopharyngeal     Culture, Blood 1 [[id number]] Collected: 10/31/24 0949    Order Status: Sent Specimen: Blood Updated: 10/31/24 1401    Culture, Blood 2 [[id number]] Collected: 10/31/24 0949    Order Status: Sent Specimen: Blood Updated: 10/31/24 1401    Culture, MRSA, Screening [[id number]]     Order Status: No result Specimen: Nares     Strep Pneumoniae Antigen [[id number]] Collected: 10/31/24 0645    Order Status: Sent Specimen: Urine, clean catch Updated: 10/31/24 1401    Legionella antigen, urine [[id number]] Collected: 10/31/24 0645    Order Status: Sent Specimen: Urine Updated: 10/31/24 1401    MRSA DNA Probe, Nasal [[id number]] Collected: 10/31/24 0530    Order Status: Completed Specimen: Nares Updated: 10/31/24 1452     MRSA SCREEN RT-PCR --     Negative  MRSA DNA not detected.  Normal Range: Not detected      Narrative:      ORDER#: [id number]                          ORDERED BY: MAGDALENA MANZO  SOURCE: Nares                              COLLECTED:  10/31/24 05:30  ANTIBIOTICS AT [facility].:                      RECEIVED :  10/31/24 09:28    Clostridium difficile toxin/antigen [[id number]] Collected: 10/31/24 5025    Order Status: Completed Specimen: Stool Updated: 10/31/24 0558     C.diff Toxin/Antigen --     Negative for Clostridium difficile antigen and toxin  Normal Range: Negative      Narrative:      ORDER#: [id number]        Results discussed with Dr Steen by Sanjay Qiu MD at 8:03 am on   10/31/2024         CT CHEST PULMONARY EMBOLISM W CONTRAST   Final Result   Chest:      Poor opacification anterior branch left upper lobe pulmonary artery, not   clearly artifactual, suggestive of small left upper lobe pulmonary embolus.      Patchy nodularity right upper lobe and right middle lobe suspicious for   pneumonia      Bilateral pleural effusions with adjacent consolidation of the lung bases.   Basilar lung consolidation is either due to atelectasis or pneumonia      Sclerosis of the humeral heads bilaterally suggesting avascular necrosis.      Abdomen and pelvis      Scattered fluid-filled loops of small and large bowel are seen.  No definite   transition point to suggest obstruction.  There is mild abdominal and pelvic   ascites seen, which may relate to fluid overload given the body wall anasarca.      There is suspected avascular necrosis of the femoral heads bilaterally and a   subchondral fracture on the left.      RECOMMENDATIONS:   Results discussed with Dr Steen by Sanjay Qiu MD at 8:03 am on   10/31/2024             Outside records:    Labs, Microbiology, Radiology and pertinent results from Care everywhere, if available, were reviewed as a part ofthe consultation.      Problem list:       Patient Active Problem List   Diagnosis Code    Septic shock ([facility]) A41.9, R65.21    Sepsis ([facility]) A41.9    Shortness of breath R06.02    Swelling of lower extremity M79.89    Cocaine abuse ([facility]) F14.10    Overweight (BMI 25.0-29.9) E66.3    History of alcoholism ([facility]) F10.21    Avascular necrosis of bone of hip M87.059    Multifocal pneumonia J18.9    Acute respiratory failure with hypoxia J96.01         Please note that this chart was generated using Dragon dictation software. Although every effort was made to ensure the accuracy of this automated transcription, some errors in transcription may have occurred inadvertently. If you  may need any clarification, please do not hesitate to contact me through EPIC or at the phone number provided below with my electronic signature.  Any pictures or media included in this note were obtained after taking informed verbal consent from the patient and with their approval to include those in the patient's medical record.        Cj Carbajal MD, MPH, FACP, FID  10/31/2024, 3:23 PM  Central Office Phone: [phone]  Central Office Fax: [phone]    [facility]   [address], [address] ([address])  [place] [address]  [facility] days:  Tuesday & Thursday AM    [facility]  [address] , [address] (Medical office Building)  [place], [place], [address]  [facility] days: Wednesday AM

## 2024-10-31 NOTE — H&P
Hospital Medicine History & Physical      PCP: No primary care provider on file.    Date of Admission: 10/31/2024    Date of Service: Pt seen/examined on 10/31/2024    Pt seen/examined face to face on and admitted as inpatient with expected LOS to be two days but can change depending on diagnostic work up and treatment response.     Chief Complaint:    No chief complaint on file.  Tachycardia        ASSESSMENT AND PLAN:    Active Hospital Problems    Diagnosis Date Noted    Sepsis (HCC) [A41.9] 10/31/2024     Sepsis: Fever, tachycardia  Secondary to pneumonia  Blood cultures with fungal culture obtained  UA ordered ordered on admission pending  ID consulted, appreciated  Patient did have pneumonia at  thus it is unclear if this is recurrent or new  IV Vanco and cefepime  ID consulted, much appreciated    Elevated troponin:  Continue to trend  Echo recently done showing EF 45%    Ileus:  Surgery consulted, appreciate    Left finger discoloration:  Podiatry consulted for biopsy to rule out melanoma    Hypokalemia: Replaced  Hypomagnesium: Replaced    .Due to the above diagnosis makes the patient higher risk for morbidity and mortality requiring testing and treatment      Discussion with the primary ER physician in regards to symptoms, history, physical exam, diagnosis and treatment, collaborative decision was to admit the patient.    Diet: Soft diet    DVT Prophylaxis: lovenox    Dispo:   Expected LOS of two days      History Of Present Illness:      28 y.o. male who presented to [facility] with past medical history of anemia, sepsis, ARDS, TONYA, heart failure had a prolonged stay at  hospital was transferred to rehab center    Rapid response was called and patient just came to rehab at 4 PM reported it was tachycardic since arrival continues to be tachycardic and spiked a fever when a rapid was called.  Patient currently has no current complaints reported that his rash started 2 days ago and continued  pneumonia  EKG:  I have reviewed the EKG with the following interpretation:   Poor EKG however appears to be sinus tachycardia    .  No orders to display                Girma Metz, DO

## 2024-10-31 NOTE — PROGRESS NOTES
0832 Rapid Response called for oxygen saturations in the 80's. Pt placed on airvo at 60L and verbal orders to transfer patient to ICU. Call placed to clinical administration for bed number.

## 2024-10-31 NOTE — PROGRESS NOTES
Rapid Response Quick Summary    Room: [facility]4904/4904-01    Assessment of concern / patient:  pt admitted to rehab today. Tachycardic and now having fever.     Physician involved:  Dr Metz    Interventions:  labs ordered and collected. Xrays ordered and completed    Disposition:  pt to be discharged from rehab and admitted to inpatient unit for further workup.

## 2024-10-31 NOTE — DISCHARGE SUMMARY
Physical Medicine & Rehabilitation  Discharge Summary     Patient Identification:  Jm Snow  : 1995  Admit date: 10/30/2024  Discharge date: 10/31/2024  Attending provider: Girma Metz DO        Primary care provider: No primary care provider on file.     Discharge Diagnoses:   Patient Active Problem List   Diagnosis    Shock, septic (HCC)    Sepsis (HCC)       Discharge Functional Status:      Physical therapy:  Supine to Sit:    Sit to Supine:        Sit to Stand:    Chair/Bed to Chair Transfer:    Car Transfer:       Ambulation 10 ft:    Ambulation 50 ft:    Ambulation 150 ft:    Stairs - 1 Step:    Stairs - 4 Step:    Stairs - 12 Step:      Occupational therapy:  Eating:    Oral Hygiene:    Bathing:    Upper Body Dressing:    Lower Body Dressing:       Toilet Transfer:    Toilet Hygiene:      Speech therapy:         History of Present Illness/Acute Hospital Course:  Jm Snow is a 28 y.o. male with PMHx notable for EtOH use disorder, anxiety and HTN who presented to [facility] on  with sepsis on , found to have fungenemia of unclear source, possibly secondary to bilateral hip AVN with septic arthritis (however, left hip aspirate cultures with no growth).  He completed 7 days of empiric antibiotics.  Hospital course was complicated by severe TONYA acute renal failure due to ATN, requiring hemodialysis with now recovered renal function.  He additionally developed acute hypoxic respiratory failure secondary to ARDS and aspiration pneumonia requiring intubation on 10/12.  He was transferred to [facility] MICU on 10/12 for further management. Extubated on 10/18.  Hospital course was further complicated by encephalopathy with agitation and psychosis (episodes of hallucination), suspected multifactorial due to metabolic derangements, active infection and possible alcohol withdrawal, as well as ileus.     Patient unable to be formally assessed on day of admission, due to acute medical complexity  requiring prompt discharged to higher level of care.  Upon arrival he was noted to be tachycardic and tremulous.  EKG was obtained, though limited due to tremor.  He developed fever to 101.9 F overnight, raising concern for sepsis and rapid response was called.  Stat labs were obtained and patient was discharged to internal medicine service in the acute hospital.    Inpatient Rehabilitation Course:   Jm Snow is a 28 y.o. male admitted to inpatient rehabilitation on 10/30/2024 s/p Shock, septic (HCC).  The patient participated in an aggressive multidisciplinary inpatient rehabilitation program involving 3 hours of therapy per day, at least 5 days per week.     Impairments: Generalized weakness, decreased functional endurance     Medical Management:  #. Septic shock, fungemia  - Source unclear, possibly septic hip arthritis in the setting of AVN  - Completed course of empiric vancomycin/cefepime/metronidazole, and fluconazole at  [facility]  - PT/OT  - Multiple risk factors for critical illness myopathy/polyneuropathy, will require formal neurological examination  - Developed tachycardia and fever shortly after ARU admission.  Discharged acutely to internal medicine service in acute hospital for further workup and management.      #. HFrEF  - EF 40-45%  - Cardiology consulted at , deferred ischemic evaluation at this time, though recommending repeat TTE in 2-3 months              - No GDMT      #. Acute renal failure, resolved  - Due to ATN  - Kidney Bx on 10/7: Toxic tubular injury, no evidence of interstitial nephritis, glomerulitis, vasculitis or immune complex deposition  - Required CV the HD 10/13->10/18, SCUF 10/18->10/21, iHD 10/21-10/25  - Urine output improving  - Vascular access removed on 10/30, prior to discharge from      #.  Encephalopathy  #.  Anxiety  - Multifactorial in the setting of metabolic derangement, acute infection, possible EtOH withdrawal  - Continue Buspar 5 mg every 8 hours,  discharge encounter between counseling, coordination of care, and medication reconciliation.      To comply with [facility] bylaw R.II.4.1:  Discharge order placed in advance to facilitate patient's discharge needs.    MD Chinedu Ferguson MD 10/31/2024, 8:25 AM    * This document was created using dictation software.  While all precautions were taken to ensure accuracy, errors may have occurred.  Please disregard any typographical errors.

## 2024-10-31 NOTE — PROGRESS NOTES
Rapid Response Quick Summary    Room: [address] [place] [address]/3311-01    Assessment of concern / patient:  pt with increased O2 requirement and continues to be tachycardic    Physician involved:  Dr Metz    Interventions:  CTPA, CT abd and pelvis and labs ordered    Disposition:  pt to move to PCU level of care

## 2024-10-31 NOTE — H&P
Patient: Jm Snow  [id number]  Date: 10/31/2024      Chief Complaint: Debility due to septic shock    History of Present Illness/Hospital Course:  Jm Snow is a 28 y.o. male with PMHx notable for EtOH use disorder, anxiety and HTN who presented to [facility] on 9/30 with sepsis on 9/30, found to have fungenemia of unclear source, possibly secondary to bilateral hip AVN with septic arthritis (however, left hip aspirate cultures with no growth).  He completed 7 days of empiric antibiotics.  Hospital course was complicated by severe TONYA acute renal failure due to ATN, requiring hemodialysis with now recovered renal function.  He additionally developed acute hypoxic respiratory failure secondary to ARDS and aspiration pneumonia requiring intubation on 10/12.  He was transferred to [facility] MICU on 10/12 for further management.  Extubated on 10/18. Hospital course was further complicated by encephalopathy with agitation and psychosis (episodes of hallucination), suspected multifactorial due to metabolic derangements, active infection and possible alcohol withdrawal, as well as ileus.    Patient unable to be formally assessed on day of admission, due to acute medical complexity requiring prompt discharged to higher level of care.  Upon arrival he was noted to be tachycardic and tremulous.  EKG was obtained, though limited due to tremor.  He developed fever to 101.9 F overnight, raising concern for sepsis and rapid response was called.  Stat labs were obtained and patient was discharged to internal medicine service in the acute hospital.     has a past medical history of Acute anxiety, Acute HFrEF (heart failure with reduced ejection fraction) ([facility]), TONYA (acute kidney injury) ([facility]), Anasarca, ARDS (adult respiratory distress syndrome), ATN (acute tubular necrosis) ([facility]), Constipation, ETOH abuse, Fungemia, Ileus ([facility]), Prolonged Q-T interval on ECG, and Septic shock ([facility]).     has no past surgical history on file.          family history is not on file.    Allergies: Patient has no known allergies.    No current facility-administered medications for this encounter.     No current outpatient medications on file.     Facility-Administered Medications Ordered in Other Encounters   Medication Dose Route Frequency Provider Last Rate Last Admin    sodium chloride flush 0.9 % injection 10 mL  10 mL IntraVENous 2 times per day Lashay, Ahmad A, DO        sodium chloride flush 0.9 % injection 10 mL  10 mL IntraVENous PRN Lashay, Ahmad A, DO        0.9 % sodium chloride infusion   IntraVENous PRN Lashay, Ahmad A,  mL/hr at 10/31/24 0422 700 mL at 10/31/24 0422    promethazine (PHENERGAN) tablet 12.5 mg  12.5 mg Oral Q6H PRN Lashay, Ahmad A, DO        Or    ondansetron (ZOFRAN) injection 4 mg  4 mg IntraVENous Q6H PRN Lashay, Ahmad A, DO        melatonin tablet 3 mg  3 mg Oral Nightly PRN Lashay, Ahmad A, DO        nicotine (NICODERM CQ) 21 MG/24HR 1 patch  1 patch TransDERmal Daily PRN Lashay, Ahmad A, DO        acetaminophen (TYLENOL) tablet 650 mg  650 mg Oral Q6H PRN Lashay, Ahmad A, DO        Or    acetaminophen (TYLENOL) suppository 650 mg  650 mg Rectal Q6H PRN Lashay, Ahmad A, DO        enoxaparin (LOVENOX) injection 40 mg  40 mg SubCUTAneous Daily Lashay, Ahmad A, DO        potassium chloride 10 mEq/100 mL IVPB (Peripheral Line)  10 mEq IntraVENous Q1H Lashay, Ahmad A,  mL/hr at 10/31/24 0616 10 mEq at 10/31/24 0616    ceFEPIme (MAXIPIME) 2,000 mg in sodium chloride 0.9 % 100 mL IVPB (mini-bag)  2,000 mg IntraVENous q8h Lashay, Ahmad A, DO   Stopped at 10/31/24 0516    magnesium sulfate 1000 mg in dextrose 5% 100 mL IVPB  1,000 mg IntraVENous Q1H Lashay, Ahmad A,  mL/hr at 10/31/24 0635 1,000 mg at 10/31/24 0635    vancomycin (VANCOCIN) 1250 mg in D5W 250 mL IVPB  1,250 mg IntraVENous Q8H Girma Metz DO        metroNIDAZOLE (FLAGYL) 500 mg in 0.9% NaCl 100 mL IVPB premix  500 mg IntraVENous Q8H Girma Metz,  acute hospital for further workup and management.     #. HFrEF  - EF 40-45%  - Cardiology consulted at , deferred ischemic evaluation at this time, though recommending repeat TTE in 2-3 months   - No GDMT     #. Acute renal failure, resolved  - Due to ATN  - Kidney Bx on 10/7: Toxic tubular injury, no evidence of interstitial nephritis, glomerulitis, vasculitis or immune complex deposition  - Required CV the HD 10/13->10/18, SCUF 10/18->10/21, iHD 10/21-10/25  - Urine output improving  - Vascular access removed on 10/30, prior to discharge from     #.  Encephalopathy  #.  Anxiety  - Multifactorial in the setting of metabolic derangement, acute infection, possible EtOH withdrawal  - Continue Buspar 5 mg every 8 hours, escitalopram 20 mg daily  - Melatonin 3 mg qhs PRN for sleep  - Do not resume home Ambien    #. Recent ileus  - Required NG tube decompression at  [facility]  - Repeat imaging at [facility] showed new ileus  - Noted to be tolerating PO diet, NG tube was removed at [facility] prior to rehab admission    Chronic Conditions:  - HTN: Resume home valsartan at reduced dose 40 mg daily    CODE: Full Code  Diet: No diet orders on file  Bowels: Per protocol  Bladder: Per protocol   Dispo: Acute Hospital.  Plan to readmit to inpatient rehab once medically stable.    Chinedu Rosas MD 10/31/2024, 7:57 AM     * This document was created using dictation software.  While all precautions were taken to ensure accuracy, errors may have occurred.  Please disregard any typographical errors.

## 2024-10-31 NOTE — PROGRESS NOTES
4 Eyes Skin Assessment     NAME:  Jm Snow  YOB: 1995  MEDICAL RECORD NUMBER:  [id number]    The patient is being assessed for  Admission    I agree that at least one RN has performed a thorough Head to Toe Skin Assessment on the patient. ALL assessment sites listed below have been assessed.      Areas assessed by both nurses:    Head, Face, Ears, Shoulders, Back, Chest, Arms, Elbows, Hands, Sacrum. Buttock, Coccyx, Ischium, Legs. Feet and Heels, and Under Medical Devices         Does the Patient have a Wound? Yes wound(s) were present on assessment. LDA wound assessment was Initiated and completed by RN    Wound to left foot, two toes affected.       Naveen Prevention initiated by RN: Yes  Wound Care Orders initiated by RN: Yes    Pressure Injury (Stage 3,4, Unstageable, DTI, NWPT, and Complex wounds) if present, place Wound referral order by RN under : No    New Ostomies, if present place, Ostomy referral order under : No     Nurse 1 eSignature: Electronically signed by Nicolas Olivera RN on 10/31/24 at 6:58 PM EDT    **SHARE this note so that the co-signing nurse can place an eSignature**    Nurse 2 eSignature: Electronically signed by BELLA WHITT RN on 10/31/24 at 7:13 PM EDT

## 2024-10-31 NOTE — PROGRESS NOTES
20:20 Call placed to Dr Rosas for patient's heart rate between 125-50 with mild tremors.  Patient stated the tremors were due to anxiety.  Per father for the patient, the tremors have been happening since the admission at .  Per Dr Rosas, stat EKG and propranolol 10mg ordered for tonight.Patient in bed with fall precautions in place and continuous SPO2 monitoring pulse rate.      2035 Called  Dr Rosas to report the EKG reading and verbal order if the patient's temperature spikes or if the heart rate has not improved to call a rapid.  Patient in bed with fall precautions in place and continuous SPO2 monitoring pulse rate.

## 2024-10-31 NOTE — PROGRESS NOTES
Patient arrived to ICU 3216 via stretcher. Father at bedside. Patient and family oriented to room.

## 2024-10-31 NOTE — PLAN OF CARE
Podiatric Surgery Plan of Care Note  Jm Snow     Attempted to see patient however patient off the floor and vascular lab.  Will reattempt to see patient tomorrow.    Edgar Castro DPM  Chief Podiatric Resident PGY-3  Pager [phone] or Perfect Serve

## 2024-10-31 NOTE — PROGRESS NOTES
Clinical Pharmacy Note: Pharmacy to Dose Vancomycin    Jm Snow is a 28 y.o. male started on Vancomycin for Sepsis; consult received from Dr. Metz to manage therapy. Also receiving the following antibiotics: Cefepime.    Goal AUC: 400-600 mg/L*hr    Assessment/Plan:  A 2000 mg loading dose was given on 10/31 at 0413  Initiate vancomycin 1250 mg IV every 8 hours. Bayesian modeling predicts an AUC of 443 mg/L*hr and a trough of 14.2 mcg/mL at steady state concentration.  A vancomycin random level has been ordered for 11/01 at 0600  Changes in regimen will be determined based on culture results, renal function, and clinical response.  Pharmacy will continue to monitor and adjust regimen as necessary.    Allergies:  Patient has no known allergies.     Recent Labs     10/30/24  1737 10/31/24  0037   CREATININE 1.0 0.9       Recent Labs     10/31/24  0037   WBC 2.6*       Ht Readings from Last 1 Encounters:   10/30/24 1.93 m (6' 4\")        Wt Readings from Last 1 Encounters:   10/31/24 110.5 kg (243 lb 9.7 oz)         Estimated Creatinine Clearance: 165 mL/min (based on SCr of 0.9 mg/dL).      Thank you for the consult,    Kolby Ortiz, KamillaD

## 2024-10-31 NOTE — SIGNIFICANT EVENT
Rapid response was called    Patient continues to be tachycardic, hypoxic.  Patient was sitting in the bed not really having any other complaints other than shivering and tremors.  Due to patient's prolonged hospital stay worsening hypoxia tachycardia, sent for CTPA study to rule out PE in addition to CT abdomen pelvis to check for SBO and a source of infection.  Escalated antibiotic by adding Flagyl

## 2024-10-31 NOTE — PROGRESS NOTES
Pt. 's, noted with baseline anxiety. Provider made aware, N.O received and posted, EKG performed continuous pulse ox placed for monitoring. Meds administered per order. Pt. Denies symptoms other than anxiety. Pt. Temp re checked and 99.7 oral continued to monitor. Pt. Called out for bed pan, after care vitals re checked and temp 101.9 hr 116, RRT called per Dr. Castaneda instructions, team at bed side. Orders placed. Labs and x ray completed at bedside. Transfer order received. Pt. Transferred to 3 A with dad at bedside. Bedside report given to receiving nurse. All pt. Belongings present with dad at time of transfer.

## 2024-10-31 NOTE — PROGRESS NOTES
Pt. 's, noted with baseline anxiety. Provider made aware, N.O received and posted EKG performed. Meds administered. Pt. Denies symptoms other than anxiety.

## 2024-10-31 NOTE — CONSULTS
Gastroenterology Consult Note        Patient: Jm Snow  : 1995  Acct#:      Date:  10/31/2024      1. Shortness of breath    2. Swelling of lower extremity        Subjective:       History of Present Illness  Patient is a 28 y.o.  male admitted with Sepsis ([facility]) [A41.9] who is seen in consult for ileus.  History of anxiety, hypertension, alcohol use disorder.  He was admitted to  about a month ago with sepsis.  Found to have fungemia of unclear source.  Course complicated by TONYA requiring dialysis, respiratory failure requiring intubation, ileus.  Urine positive for cocaine at .    He was transferred to ARU here yesterday.  He was febrile and admitted directly to the ICU here.  CT abdomen pelvis concerning for ileus.  He had 8 or 9 bowel movements overnight that were mushy/unformed.  No nausea or vomiting.  An NG tube was placed with 100 mL output since.  Denies abdominal pain.    Past Medical History:   Diagnosis Date    Acute anxiety 10/19/2024    Acute HFrEF (heart failure with reduced ejection fraction) ([facility]) 10/25/2024    TONYA (acute kidney injury) ([facility]) 10/25/2024    Anasarca 10/25/2024    ARDS (adult respiratory distress syndrome) 10/19/2024    ATN (acute tubular necrosis) ([facility]) 10/25/2024    Constipation 10/27/2024    ETOH abuse 10/19/2024    Fungemia 10/19/2024    Ileus ([facility]) 10/27/2024    Prolonged Q-T interval on ECG 10/25/2024    Septic shock ([facility]) 10/19/2024    Secondary to Fungemia      No past surgical history on file.   Past Endoscopic History    Admission Meds  No current facility-administered medications on file prior to encounter.     Current Outpatient Medications on File Prior to Encounter   Medication Sig Dispense Refill    busPIRone (BUSPAR) 5 MG tablet Take 1 tablet by mouth 3 times daily      folic acid (FOLVITE) 1 MG tablet Take 1 tablet by mouth daily      Multiple Vitamins-Minerals (THERAPEUTIC MULTIVITAMIN-MINERALS) tablet Take 1 tablet by mouth  \"PTT\"  No results for input(s): \"OCCULTBLD\" in the last 72 hours.    Imaging Studies:               CT-scan of chest abdomen and pelvis w iv contrast 10/31/24:             IMPRESSION:  Chest:     Poor opacification anterior branch left upper lobe pulmonary artery, not  clearly artifactual, suggestive of small left upper lobe pulmonary embolus.     Patchy nodularity right upper lobe and right middle lobe suspicious for  pneumonia     Bilateral pleural effusions with adjacent consolidation of the lung bases.  Basilar lung consolidation is either due to atelectasis or pneumonia     Sclerosis of the humeral heads bilaterally suggesting avascular necrosis.     Abdomen and pelvis     Scattered fluid-filled loops of small and large bowel are seen.  No definite  transition point to suggest obstruction.  There is mild abdominal and pelvic  ascites seen, which may relate to fluid overload given the body wall anasarca.     There is suspected avascular necrosis of the femoral heads bilaterally and a  subchondral fracture on the left.       Assessment/Plan:     Abnormal CT abdomen pelvis -CT with scattered fluid-filled loops of small bowel and colon seen.  No clinical ileus as he had multiple bowel movements overnight and no nausea or vomiting.  NG tube in place with little output.  C.diff negative.   -NG tube decompression today  -Reevaluate tomorrow    Fevers - ID consulted    PE - on heparin drip.     Discussed with Dr. Edgar Finch PA-C  [facility]    ====================================================================================  I have personally performed a face to face diagnostic evaluation on this patient. I have spent more than 50% of the total clinical encounter in interviewing/examining the patient, reviewing patient chart (including but not limited to notes, labs, imaging and other testing), documentation of findings and subsequent follow up of ordered medication and testing, placing referrals and  communication with patient care providers, coordinating future care, as well as documentation in the EHR. I agree with the findings and recommended plan of care, as documented by the physician assistant/nurse practitioner.  In summary, my findings and plan are the followin-year-old  male who was admitted at our rehab unit yesterday but transferred to the ICU after he was found to have fever, shortness of breath with hypoxia.  Patient was just released from [facility] for alcohol withdrawal, sepsis, ARDS, TONYA due to ATN requiring brief dialysis, ARDS with mechanical ventilation, left femoral head fracture with bilateral vascular necrosis of the hip.  He also developed ileus while he was hospitalized that was treated with NG tube decompression.  Workup here revealed pneumonia and PE.  Patient is currently getting IV antibiotics and heparin drip.  In the same CT imaging, he was found to have fluid-filled loops of small bowel and colon, concerning for ileus.  However, patient had multiple episodes of loose bowel movements in the absence of abdominal pain, nausea and vomiting.  Currently has an NG tube for bowel decompression.  Vital signs are stable.  Abdomen is soft slightly distended, no rebound or guarding.  Normal active bowel sounds  Impressions: Abnormal CT showing colonic and small bowel ileus.  Patient does not fully satisfy the other criteria for ileus including inability to have a bowel movement or pass gas, bloating, nausea and sometimes vomiting.  Pneumonia on IV antibiotics.  PE on IV heparin.  Plans: Agree with keeping nasogastric tube for bowel decompression for the next 12 to 24 hours, continue supportive care, keep potassium above 4 and magnesium above 2.  GI will sign off.  Please call if you have any questions.  I explained my findings to the patient and his father at bedside.    Alvarez Hernández MD, MSc  [facility]  10/31/24

## 2024-10-31 NOTE — CONSULTS
PULMONARY AND CRITICAL CARE MEDICINE CONSULT NOTE      Name: Jm Snow  Sex: male  : 1995  MRN: [id number]  Admission Date: 10/31/2024  Admission Diagnosis: Sepsis (HCC) [A41.9]      HPI: Patient is a 28 y.o. male with past medical history significant for alcohol abuse, cocaine abuse who was admitted from ARU to the hospital for fever and shortness of breath with hypoxia.  Patient recently had prolonged hospitalization at  for 5-6 weeks and discharged on 10/30/2024 to ARU.  He was hospitalized for alcohol withdrawal, sepsis of unknown etiology with shock, ARDS requiring mechanical ventilation for 7 days, TONYA due to ATN and left femoral head fracture with bilateral avascular necrosis of hip.  He was treated with antibiotics.  During the course of hospitalization, patient also developed ileus and had NG tube in place which was removed before discharge from .  Patient was only at ARU for less than a day when he spiked fever of 102 °F with shortness of breath and worsening hypoxia and admitted to our hospital.  Rapid response was called today because of worsening hypoxia.  Patient was placed on Airvo and transferred to ICU.  He is currently not in any respiratory distress.  Fever 102.7 °F.  Distended abdomen.  NG tube has been placed.  Patient had CT chest performed that showed bilateral lower lobe infiltrates with atelectasis and small bilateral pleural effusions.  Patient also complained of diarrhea however, C. difficile is negative.  Lab work showed procalcitonin of 0.9.  Hemoglobin of 9, white cell count of 2.6 which improved to 6.1.  Patient also has diffuse erythematous rash on whole body.  I personally reviewed and interpreted the images    REVIEW OF SYSTEMS:   Constitutional symptoms: The patient denies fever, fatigue, night sweats, weight loss or weight gain.   HEENT: No vision changes. No tinnitus, Denies sinus pain. No hoarseness, or dysphagia.   Neck: Patient denies swelling in the  pneumonia, ATN, alcohol withdrawal and ileus requiring mechanical ventilation.  As of now, patient is not in any respiratory distress.  Continue Airvo for now.  Titrate FiO2 for saturation of 90 to 94%.  Start aggressive pulmonary toilet with incentive spirometry and Acapella every 4 hours.  If patient is unable to do I-S and Acapella then start MetaNebs every 4 hours.  CT chest also showed probable small left upper lobe PE.  Patient is on heparin drip.  Obtain DVT ultrasound.    Sinus tachycardia secondary to sepsis and fever.  Continue to monitor.  Adequate blood pressure.  Patient was noted to have a EF of 45 to 50% on echo from 10/21.  Significant edema of lower extremities.  No pulmonary edema noted.  Hold Lasix for today.    Abdominal distention likely due to ileus.  CT abdomen did not show any evidence of obstruction.  Anasarca noted.  NG tube has been placed for decompression.  Keep NPO.  Patient was getting dysphagia diet before.  Patient also complained of diarrhea.  C. difficile is negative.    Continue broad-spectrum antibiotics for now, Zyvox and cefepime.  De-escalate antibiotics based on culture sensitivities.    Elevated TSH.  Obtain free T4 levels.    History of alcohol abuse.  UDS was also positive for cocaine at .    Protonix for stress ulcer prophylaxis.    Patient's father was updated at bedside.      Critical Care Time: 45 Minutes  Total critical care time caring for this patient with life threatening illness, including direct patient contact, management of life support systems, review of data including imaging and labs, discussions with other team members and physicians excluding procedures.      Electronically signed by Miranda Morris MD on 10/31/24 at 12:14 PM EDT     This note was completed using dragon medical speech recognition software. Grammatical errors, random word insertions, pronoun errors and incomplete sentences are occasional consequences of this technology due to software

## 2024-11-01 ENCOUNTER — APPOINTMENT (OUTPATIENT)
Age: 29
DRG: 871 | End: 2024-11-01
Attending: INTERNAL MEDICINE
Payer: COMMERCIAL

## 2024-11-01 LAB
ALBUMIN SERPL-MCNC: 2.4 G/DL (ref 3.4–5)
ALBUMIN/GLOB SERPL: 0.9 {RATIO} (ref 1.1–2.2)
ALP SERPL-CCNC: 90 U/L (ref 40–129)
ALT SERPL-CCNC: 20 U/L (ref 10–40)
ANION GAP SERPL CALCULATED.3IONS-SCNC: 13 MMOL/L (ref 3–16)
ANION GAP SERPL CALCULATED.3IONS-SCNC: 13 MMOL/L (ref 3–16)
ANISOCYTOSIS BLD QL SMEAR: ABNORMAL
ANTI-XA UNFRAC HEPARIN: 0.55 IU/ML (ref 0.3–0.7)
ANTI-XA UNFRAC HEPARIN: 0.65 IU/ML (ref 0.3–0.7)
ANTI-XA UNFRAC HEPARIN: 0.96 IU/ML (ref 0.3–0.7)
AST SERPL-CCNC: 22 U/L (ref 15–37)
BACTERIA BLD CULT ORG #2: NORMAL
BACTERIA BLD CULT: NORMAL
BASOPHILS # BLD: 0 K/UL (ref 0–0.2)
BASOPHILS NFR BLD: 0 %
BILIRUB SERPL-MCNC: 0.4 MG/DL (ref 0–1)
BUN SERPL-MCNC: 26 MG/DL (ref 7–20)
BUN SERPL-MCNC: 28 MG/DL (ref 7–20)
CA-I BLD-SCNC: 0.77 MMOL/L (ref 1.12–1.32)
CALCIUM SERPL-MCNC: 6.9 MG/DL (ref 8.3–10.6)
CALCIUM SERPL-MCNC: 7.2 MG/DL (ref 8.3–10.6)
CHLORIDE SERPL-SCNC: 106 MMOL/L (ref 99–110)
CHLORIDE SERPL-SCNC: 106 MMOL/L (ref 99–110)
CO2 SERPL-SCNC: 19 MMOL/L (ref 21–32)
CO2 SERPL-SCNC: 20 MMOL/L (ref 21–32)
CREAT SERPL-MCNC: 1.2 MG/DL (ref 0.9–1.3)
CREAT SERPL-MCNC: 1.4 MG/DL (ref 0.9–1.3)
DEPRECATED RDW RBC AUTO: 15.9 % (ref 12.4–15.4)
ECHO AO ASC DIAM: 3 CM
ECHO AO ASCENDING AORTA INDEX: 1.23 CM/M2
ECHO AO ROOT DIAM: 3.3 CM
ECHO AO ROOT INDEX: 1.35 CM/M2
ECHO AV AREA PEAK VELOCITY: 3.4 CM2
ECHO AV AREA VTI: 3.3 CM2
ECHO AV AREA/BSA PEAK VELOCITY: 1.4 CM2/M2
ECHO AV AREA/BSA VTI: 1.4 CM2/M2
ECHO AV MEAN GRADIENT: 6 MMHG
ECHO AV MEAN VELOCITY: 1.2 M/S
ECHO AV PEAK GRADIENT: 9 MMHG
ECHO AV PEAK VELOCITY: 1.5 M/S
ECHO AV VELOCITY RATIO: 0.93
ECHO AV VTI: 23.4 CM
ECHO BSA: 2.43 M2
ECHO BSA: 2.48 M2
ECHO EST RA PRESSURE: 8 MMHG
ECHO LA AREA 2C: 19.6 CM2
ECHO LA AREA 4C: 16 CM2
ECHO LA MAJOR AXIS: 5.9 CM
ECHO LA MINOR AXIS: 5.1 CM
ECHO LA VOL BP: 49 ML (ref 18–58)
ECHO LA VOL MOD A2C: 60 ML (ref 18–58)
ECHO LA VOL MOD A4C: 34 ML (ref 18–58)
ECHO LA VOL/BSA BIPLANE: 20 ML/M2 (ref 16–34)
ECHO LA VOLUME INDEX MOD A2C: 25 ML/M2 (ref 16–34)
ECHO LA VOLUME INDEX MOD A4C: 14 ML/M2 (ref 16–34)
ECHO LV E' LATERAL VELOCITY: 13.6 CM/S
ECHO LV E' SEPTAL VELOCITY: 13.5 CM/S
ECHO LV EDV A2C: 130 ML
ECHO LV EDV A4C: 187 ML
ECHO LV EDV INDEX A4C: 77 ML/M2
ECHO LV EDV NDEX A2C: 53 ML/M2
ECHO LV EJECTION FRACTION A2C: 58 %
ECHO LV EJECTION FRACTION A4C: 56 %
ECHO LV EJECTION FRACTION BIPLANE: 57 % (ref 55–100)
ECHO LV ESV A2C: 55 ML
ECHO LV ESV A4C: 82 ML
ECHO LV ESV INDEX A2C: 23 ML/M2
ECHO LV ESV INDEX A4C: 34 ML/M2
ECHO LV FRACTIONAL SHORTENING: 40 % (ref 28–44)
ECHO LV INTERNAL DIMENSION DIASTOLE INDEX: 2.13 CM/M2
ECHO LV INTERNAL DIMENSION DIASTOLIC: 5.2 CM (ref 4.2–5.9)
ECHO LV INTERNAL DIMENSION SYSTOLIC INDEX: 1.27 CM/M2
ECHO LV INTERNAL DIMENSION SYSTOLIC: 3.1 CM
ECHO LV IVSD: 0.6 CM (ref 0.6–1)
ECHO LV MASS 2D: 101.7 G (ref 88–224)
ECHO LV MASS INDEX 2D: 41.7 G/M2 (ref 49–115)
ECHO LV POSTERIOR WALL DIASTOLIC: 0.6 CM (ref 0.6–1)
ECHO LV RELATIVE WALL THICKNESS RATIO: 0.23
ECHO LVOT AREA: 3.8 CM2
ECHO LVOT AV VTI INDEX: 0.87
ECHO LVOT DIAM: 2.2 CM
ECHO LVOT MEAN GRADIENT: 4 MMHG
ECHO LVOT PEAK GRADIENT: 7 MMHG
ECHO LVOT PEAK VELOCITY: 1.4 M/S
ECHO LVOT STROKE VOLUME INDEX: 31.6 ML/M2
ECHO LVOT SV: 77.1 ML
ECHO LVOT VTI: 20.3 CM
ECHO PV MAX VELOCITY: 1.8 M/S
ECHO PV PEAK GRADIENT: 14 MMHG
ECHO RV BASAL DIMENSION: 3.4 CM
ECHO RV FREE WALL PEAK S': 16.5 CM/S
ECHO RV GLOBAL SYSTOLIC STRAIN (GLS): -20.1 %
ECHO RV LONGITUDINAL DIMENSION: 7.7 CM
ECHO RV MID DIMENSION: 2.8 CM
ECHO RV TAPSE: 1.8 CM (ref 1.7–?)
EOSINOPHIL # BLD: 0.3 K/UL (ref 0–0.6)
EOSINOPHIL NFR BLD: 12 %
GFR SERPLBLD CREATININE-BSD FMLA CKD-EPI: 69 ML/MIN/{1.73_M2}
GFR SERPLBLD CREATININE-BSD FMLA CKD-EPI: 83 ML/MIN/{1.73_M2}
GLUCOSE SERPL-MCNC: 100 MG/DL (ref 70–99)
GLUCOSE SERPL-MCNC: 92 MG/DL (ref 70–99)
HCT VFR BLD AUTO: 23.1 % (ref 40.5–52.5)
HGB BLD-MCNC: 7.4 G/DL (ref 13.5–17.5)
LEGIONELLA AG UR QL: NORMAL
LYMPHOCYTES # BLD: 0.2 K/UL (ref 1–5.1)
LYMPHOCYTES NFR BLD: 6 %
MAGNESIUM SERPL-MCNC: 1.55 MG/DL (ref 1.8–2.4)
MAGNESIUM SERPL-MCNC: 1.75 MG/DL (ref 1.8–2.4)
MAGNESIUM SERPL-MCNC: 1.88 MG/DL (ref 1.8–2.4)
MCH RBC QN AUTO: 29.7 PG (ref 26–34)
MCHC RBC AUTO-ENTMCNC: 31.8 G/DL (ref 31–36)
MCV RBC AUTO: 93.5 FL (ref 80–100)
METAMYELOCYTES NFR BLD MANUAL: 2 %
MONOCYTES # BLD: 0.3 K/UL (ref 0–1.3)
MONOCYTES NFR BLD: 10 %
MRSA SPEC QL CULT: NORMAL
NEUTROPHILS # BLD: 1.9 K/UL (ref 1.7–7.7)
NEUTROPHILS NFR BLD: 68 %
NEUTS BAND NFR BLD MANUAL: 2 % (ref 0–7)
PH BLDV: 7.56 [PH] (ref 7.35–7.45)
PLATELET # BLD AUTO: 188 K/UL (ref 135–450)
PLATELET BLD QL SMEAR: ADEQUATE
PMV BLD AUTO: 9.7 FL (ref 5–10.5)
POTASSIUM SERPL-SCNC: 2.6 MMOL/L (ref 3.5–5.1)
POTASSIUM SERPL-SCNC: 2.9 MMOL/L (ref 3.5–5.1)
POTASSIUM SERPL-SCNC: 3 MMOL/L (ref 3.5–5.1)
PROT SERPL-MCNC: 5.2 G/DL (ref 6.4–8.2)
RBC # BLD AUTO: 2.47 M/UL (ref 4.2–5.9)
REPORT: NORMAL
RESP PATH DNA+RNA PNL NPH NAA+NON-PROBE: NORMAL
S PNEUM AG UR QL: NORMAL
SLIDE REVIEW: ABNORMAL
SODIUM SERPL-SCNC: 138 MMOL/L (ref 136–145)
SODIUM SERPL-SCNC: 139 MMOL/L (ref 136–145)
WBC # BLD AUTO: 2.6 K/UL (ref 4–11)

## 2024-11-01 PROCEDURE — 80053 COMPREHEN METABOLIC PANEL: CPT

## 2024-11-01 PROCEDURE — 2580000003 HC RX 258: Performed by: INTERNAL MEDICINE

## 2024-11-01 PROCEDURE — 97112 NEUROMUSCULAR REEDUCATION: CPT

## 2024-11-01 PROCEDURE — 99291 CRITICAL CARE FIRST HOUR: CPT | Performed by: INTERNAL MEDICINE

## 2024-11-01 PROCEDURE — 94761 N-INVAS EAR/PLS OXIMETRY MLT: CPT

## 2024-11-01 PROCEDURE — 97166 OT EVAL MOD COMPLEX 45 MIN: CPT

## 2024-11-01 PROCEDURE — 97162 PT EVAL MOD COMPLEX 30 MIN: CPT

## 2024-11-01 PROCEDURE — 6360000002 HC RX W HCPCS: Performed by: STUDENT IN AN ORGANIZED HEALTH CARE EDUCATION/TRAINING PROGRAM

## 2024-11-01 PROCEDURE — 94669 MECHANICAL CHEST WALL OSCILL: CPT

## 2024-11-01 PROCEDURE — 6360000002 HC RX W HCPCS: Performed by: INTERNAL MEDICINE

## 2024-11-01 PROCEDURE — 93306 TTE W/DOPPLER COMPLETE: CPT | Performed by: INTERNAL MEDICINE

## 2024-11-01 PROCEDURE — 84132 ASSAY OF SERUM POTASSIUM: CPT

## 2024-11-01 PROCEDURE — 96368 THER/DIAG CONCURRENT INF: CPT

## 2024-11-01 PROCEDURE — 82330 ASSAY OF CALCIUM: CPT

## 2024-11-01 PROCEDURE — 51798 US URINE CAPACITY MEASURE: CPT

## 2024-11-01 PROCEDURE — 97530 THERAPEUTIC ACTIVITIES: CPT

## 2024-11-01 PROCEDURE — 96376 TX/PRO/DX INJ SAME DRUG ADON: CPT

## 2024-11-01 PROCEDURE — 6360000004 HC RX CONTRAST MEDICATION: Performed by: INTERNAL MEDICINE

## 2024-11-01 PROCEDURE — 97110 THERAPEUTIC EXERCISES: CPT

## 2024-11-01 PROCEDURE — 85025 COMPLETE CBC W/AUTO DIFF WBC: CPT

## 2024-11-01 PROCEDURE — 2000000000 HC ICU R&B

## 2024-11-01 PROCEDURE — 36415 COLL VENOUS BLD VENIPUNCTURE: CPT

## 2024-11-01 PROCEDURE — 97535 SELF CARE MNGMENT TRAINING: CPT

## 2024-11-01 PROCEDURE — 2700000000 HC OXYGEN THERAPY PER DAY

## 2024-11-01 PROCEDURE — 6370000000 HC RX 637 (ALT 250 FOR IP): Performed by: INTERNAL MEDICINE

## 2024-11-01 PROCEDURE — G0378 HOSPITAL OBSERVATION PER HR: HCPCS

## 2024-11-01 PROCEDURE — 96366 THER/PROPH/DIAG IV INF ADDON: CPT

## 2024-11-01 PROCEDURE — C8929 TTE W OR WO FOL WCON,DOPPLER: HCPCS

## 2024-11-01 PROCEDURE — 85520 HEPARIN ASSAY: CPT

## 2024-11-01 PROCEDURE — 83735 ASSAY OF MAGNESIUM: CPT

## 2024-11-01 PROCEDURE — P9047 ALBUMIN (HUMAN), 25%, 50ML: HCPCS | Performed by: INTERNAL MEDICINE

## 2024-11-01 PROCEDURE — 93970 EXTREMITY STUDY: CPT | Performed by: INTERNAL MEDICINE

## 2024-11-01 PROCEDURE — 96375 TX/PRO/DX INJ NEW DRUG ADDON: CPT

## 2024-11-01 RX ORDER — FUROSEMIDE 10 MG/ML
20 INJECTION INTRAMUSCULAR; INTRAVENOUS ONCE
Status: COMPLETED | OUTPATIENT
Start: 2024-11-01 | End: 2024-11-01

## 2024-11-01 RX ORDER — POTASSIUM CHLORIDE 7.45 MG/ML
10 INJECTION INTRAVENOUS PRN
Status: DISCONTINUED | OUTPATIENT
Start: 2024-11-01 | End: 2024-11-07 | Stop reason: HOSPADM

## 2024-11-01 RX ORDER — POTASSIUM CHLORIDE 7.45 MG/ML
10 INJECTION INTRAVENOUS
Status: DISPENSED | OUTPATIENT
Start: 2024-11-01 | End: 2024-11-01

## 2024-11-01 RX ORDER — ALBUMIN (HUMAN) 12.5 G/50ML
25 SOLUTION INTRAVENOUS ONCE
Status: COMPLETED | OUTPATIENT
Start: 2024-11-01 | End: 2024-11-01

## 2024-11-01 RX ORDER — MAGNESIUM SULFATE IN WATER 40 MG/ML
2000 INJECTION, SOLUTION INTRAVENOUS ONCE
Status: COMPLETED | OUTPATIENT
Start: 2024-11-01 | End: 2024-11-01

## 2024-11-01 RX ORDER — POTASSIUM CHLORIDE 7.45 MG/ML
10 INJECTION INTRAVENOUS ONCE
Status: COMPLETED | OUTPATIENT
Start: 2024-11-01 | End: 2024-11-01

## 2024-11-01 RX ORDER — POTASSIUM CHLORIDE 7.45 MG/ML
10 INJECTION INTRAVENOUS
Status: DISCONTINUED | OUTPATIENT
Start: 2024-11-01 | End: 2024-11-01

## 2024-11-01 RX ORDER — POTASSIUM CHLORIDE 1500 MG/1
40 TABLET, EXTENDED RELEASE ORAL PRN
Status: DISCONTINUED | OUTPATIENT
Start: 2024-11-01 | End: 2024-11-07 | Stop reason: HOSPADM

## 2024-11-01 RX ADMIN — HEPARIN SODIUM 20 UNITS/KG/HR: 10000 INJECTION, SOLUTION INTRAVENOUS at 10:23

## 2024-11-01 RX ADMIN — POTASSIUM CHLORIDE 10 MEQ: 10 INJECTION, SOLUTION INTRAVENOUS at 10:33

## 2024-11-01 RX ADMIN — POTASSIUM CHLORIDE 10 MEQ: 10 INJECTION, SOLUTION INTRAVENOUS at 15:14

## 2024-11-01 RX ADMIN — LEVOFLOXACIN 750 MG: 750 INJECTION, SOLUTION INTRAVENOUS at 16:20

## 2024-11-01 RX ADMIN — Medication 10 ML: at 21:06

## 2024-11-01 RX ADMIN — LINEZOLID 600 MG: 600 INJECTION, SOLUTION INTRAVENOUS at 16:13

## 2024-11-01 RX ADMIN — POTASSIUM CHLORIDE 10 MEQ: 10 INJECTION, SOLUTION INTRAVENOUS at 12:42

## 2024-11-01 RX ADMIN — ACETAMINOPHEN 650 MG: 650 SOLUTION ORAL at 03:03

## 2024-11-01 RX ADMIN — Medication 10 ML: at 09:00

## 2024-11-01 RX ADMIN — MICAFUNGIN SODIUM 100 MG: 100 INJECTION, POWDER, LYOPHILIZED, FOR SOLUTION INTRAVENOUS at 17:20

## 2024-11-01 RX ADMIN — HEPARIN SODIUM 20 UNITS/KG/HR: 10000 INJECTION, SOLUTION INTRAVENOUS at 23:04

## 2024-11-01 RX ADMIN — POTASSIUM CHLORIDE 10 MEQ: 10 INJECTION, SOLUTION INTRAVENOUS at 16:24

## 2024-11-01 RX ADMIN — POTASSIUM CHLORIDE 10 MEQ: 10 INJECTION, SOLUTION INTRAVENOUS at 13:54

## 2024-11-01 RX ADMIN — POTASSIUM CHLORIDE 10 MEQ: 10 INJECTION, SOLUTION INTRAVENOUS at 09:30

## 2024-11-01 RX ADMIN — POTASSIUM CHLORIDE 10 MEQ: 10 INJECTION, SOLUTION INTRAVENOUS at 11:41

## 2024-11-01 RX ADMIN — PANTOPRAZOLE SODIUM 40 MG: 40 INJECTION, POWDER, FOR SOLUTION INTRAVENOUS at 09:31

## 2024-11-01 RX ADMIN — MAGNESIUM SULFATE HEPTAHYDRATE 2000 MG: 40 INJECTION, SOLUTION INTRAVENOUS at 10:37

## 2024-11-01 RX ADMIN — ALBUMIN (HUMAN) 25 G: 0.25 INJECTION, SOLUTION INTRAVENOUS at 10:42

## 2024-11-01 RX ADMIN — LINEZOLID 600 MG: 600 INJECTION, SOLUTION INTRAVENOUS at 03:02

## 2024-11-01 RX ADMIN — SULFUR HEXAFLUORIDE 2 ML: 60.7; .19; .19 INJECTION, POWDER, LYOPHILIZED, FOR SUSPENSION INTRAVENOUS; INTRAVESICAL at 09:14

## 2024-11-01 RX ADMIN — FUROSEMIDE 20 MG: 10 INJECTION, SOLUTION INTRAMUSCULAR; INTRAVENOUS at 11:41

## 2024-11-01 ASSESSMENT — PAIN SCALES - GENERAL
PAINLEVEL_OUTOF10: 0

## 2024-11-01 NOTE — PROGRESS NOTES
Infectious Diseases   Progress Note      Admission Date: 10/31/2024  Hospital Day: Hospital Day: 2   Attending: Adrienne Contreras MD  Date of service: 11/1/2024     Chief complaint/ Reason for consult:     Acute respiratory failure with hypoxia, currently requiring high flow oxygen  Multifocal pneumonia  Diffuse maculopapular rash, likely drug rash  Septic shock with high-grade fever 102.7, tachycardia, tachypnea, hypotension requiring vasopressors  Recent history of fungemia  Prolonged hospitalization at     Microbiology:      I have reviewed allavailable micro lab data and cultures    Results       Procedure Component Value Units Date/Time    Culture, MRSA, Screening [[id number]] Collected: 10/31/24 1845    Order Status: Sent Specimen: Nares Updated: 11/01/24 0139    Respiratory Panel, Molecular, with COVID-19 (Restricted: peds pts or suitable admitted adults) [[id number]] Collected: 10/31/24 1845    Order Status: Completed Specimen: Nasopharyngeal Updated: 11/01/24 0308     Respiratory Panel PCR --     Respiratory Pathogens Panel PCR Result: Not Detected  See additional report for complete Respiratory Pathogens Panel      Narrative:      ORDER#: [id number]                          ORDERED BY: REECE ROBERTO  SOURCE: Nasopharyngeal                     COLLECTED:  10/31/24 18:45  ANTIBIOTICS AT [facility].:                      RECEIVED :  11/01/24 01:51    Respiratory Panel Film Array Report [[id number]] Collected: 10/31/24 1845    Order Status: Completed Updated: 11/01/24 0309     Report SEE IMAGE    Culture, Blood 1 [[id number]] Collected: 10/31/24 0949    Order Status: Completed Specimen: Blood Updated: 11/01/24 1015     Blood Culture, Routine No Growth to date.  Any change in status will be called.    Narrative:      ORDER#: [id number]                          ORDERED BY: ADRIENNE CONTRERAS  SOURCE: Blood Hand, Right                  COLLECTED:  10/31/24 09:49  ANTIBIOTICS AT [facility].:                      RECEIVED :   pantoprazole  40 mg IntraVENous Daily    sodium chloride  1,000 mL IntraVENous Once    levofloxacin  750 mg IntraVENous Q24H    micafungin  100 mg IntraVENous Q24H        sodium chloride Stopped (10/31/24 1407)    heparin (PORCINE) Infusion 20 Units/kg/hr (11/01/24 1023)       sodium chloride flush, sodium chloride, promethazine **OR** ondansetron, melatonin, nicotine, acetaminophen **OR** acetaminophen, [Held by provider] zolpidem, heparin (porcine), heparin (porcine), acetaminophen      Problem list:       Patient Active Problem List   Diagnosis Code    Septic shock ([facility]) A41.9, R65.21    Sepsis ([facility]) A41.9    Shortness of breath R06.02    Swelling of lower extremity M79.89    Cocaine abuse ([facility]) F14.10    Overweight (BMI 25.0-29.9) E66.3    History of alcoholism ([facility]) F10.21    Avascular necrosis of bone of hip M87.059    Multifocal pneumonia J18.9    Acute respiratory failure with hypoxia J96.01       Please note that this chart was generated using Dragon dictation software. Although every effort was made to ensure the accuracy of this automated transcription, some errors in transcription may have occurred inadvertently. If you may need any clarification, please do not hesitate to contact me through EPIC or at the phone number provided below with my electronic signature.  Any pictures or media included in this note were obtained after taking informed verbal consent from the patient and with their approval to include those in the patient's medical record.        Cj Carbajal MD, MPH, FACP, FIDSA  11/1/2024, 1:12 PM  Central Office Phone: [phone]  Central Office Fax: [phone]    [facility]   [address], [address] ([address])  [place] [address]  [facility] days:  Tuesday & Thursday AM    [facility]  [address] , [address] (Medical office Building)  [place], [place], [address]  [facility] days: Wednesday AM

## 2024-11-01 NOTE — PROGRESS NOTES
PULMONARY AND CRITICAL CARE MEDICINE PROGRESS NOTE    Subjective: Patient is has been weaned down to 6 L nasal cannula.  Saturating well.  Sinus tachycardia persist with heart rate ranging from 115-125/min.  Afebrile today.  Creatinine increased to 1.4.  Urine output is not completely documented as patient does not have a Salvador catheter.    REVIEW OF SYSTEMS:   Constitutional symptoms: The patient denies fever, fatigue, night sweats, weight loss or weight gain.   HEENT: No vision changes. No tinnitus, Denies sinus pain. No hoarseness, or dysphagia.   Neck: Patient denies swelling in the neck.   Cardiovascular: Denies chest pain, palpitation, syncope.  Respiratory: Improved shortness of breath or cough.   Gastrointestinal: Denies nausea, abdominal pain or change in bowel function.  Genitourinary: Denies obstructive symptoms. No history of incontinence.  Skin: No rashes or itching.   Muskuloskeletal: Denies weakness or bone pain.   Neurological: No headaches or seizures.   Psychiatric: Denies mood swings or depression.     MEDICATIONS:     Scheduled Meds:   magnesium sulfate  2,000 mg IntraVENous Once    potassium chloride  10 mEq IntraVENous Q1H    sodium chloride flush  10 mL IntraVENous 2 times per day    [Held by provider] escitalopram  20 mg Oral Daily    [Held by provider] valsartan  80 mg Oral Daily    linezolid  600 mg IntraVENous Q12H    pantoprazole  40 mg IntraVENous Daily    sodium chloride  1,000 mL IntraVENous Once    levofloxacin  750 mg IntraVENous Q24H    micafungin  100 mg IntraVENous Q24H       Current Infusions:    sodium chloride Stopped (10/31/24 1407)    heparin (PORCINE) Infusion 20 Units/kg/hr (11/01/24 1023)       PRN meds:  sodium chloride flush, sodium chloride, promethazine **OR** ondansetron, melatonin, nicotine, acetaminophen **OR** acetaminophen, [Held by provider] zolpidem, heparin (porcine), heparin (porcine), acetaminophen    PHYSICAL EXAM:  BP 99/70   Pulse (!) 124   Temp 99

## 2024-11-01 NOTE — PLAN OF CARE
Problem: Discharge Planning  Goal: Discharge to home or other facility with appropriate resources  10/31/2024 2237 by Inna Milner RN  Outcome: Progressing  Flowsheets (Taken 10/31/2024 2000)  Discharge to home or other facility with appropriate resources: Identify discharge learning needs (meds, wound care, etc)  10/31/2024 2236 by Inna Milner RN  Outcome: Progressing  Flowsheets (Taken 10/31/2024 2000)  Discharge to home or other facility with appropriate resources: Identify discharge learning needs (meds, wound care, etc)     Problem: Safety - Adult  Goal: Free from fall injury  10/31/2024 2237 by Inna Milner RN  Outcome: Progressing  Flowsheets (Taken 10/31/2024 2237)  Free From Fall Injury:   Instruct family/caregiver on patient safety   Based on caregiver fall risk screen, instruct family/caregiver to ask for assistance with transferring infant if caregiver noted to have fall risk factors  10/31/2024 2236 by Inna Milner RN  Outcome: Progressing     Problem: Skin/Tissue Integrity  Goal: Absence of new skin breakdown  Description: 1.  Monitor for areas of redness and/or skin breakdown  2.  Assess vascular access sites hourly  3.  Every 4-6 hours minimum:  Change oxygen saturation probe site  4.  Every 4-6 hours:  If on nasal continuous positive airway pressure, respiratory therapy assess nares and determine need for appliance change or resting period.  10/31/2024 2237 by Inna Milner RN  Outcome: Progressing  10/31/2024 2236 by Inna Milner RN  Outcome: Progressing

## 2024-11-01 NOTE — CARE COORDINATION
Case Management Assessment  Initial Evaluation    Date/Time of Evaluation: 11/1/2024 4:28 PM  Assessment Completed by: ARSEN Zambrano, CONNIEW    If patient is discharged prior to next notation, then this note serves as note for discharge by case management.    Patient Name: Jm Snow                   YOB: 1995  Diagnosis: Sepsis (HCC) [A41.9]                   Date / Time: 10/31/2024  1:08 AM    Patient Admission Status: Inpatient   Readmission Risk (Low < 19, Mod (19-27), High > 27): Readmission Risk Score: 12.9    Current PCP: No primary care provider on file.  PCP verified by CM? Yes    Chart Reviewed: Yes      History Provided by: Patient  Patient Orientation: Alert and Oriented    Patient Cognition: Alert    Hospitalization in the last 30 days (Readmission):  No    If yes, Readmission Assessment in CM Navigator will be completed.    Advance Directives:      Code Status: Full Code   Patient's Primary Decision Maker is:        Discharge Planning:    Patient lives with: Family Members Type of Home: House (2 level - 1 step)  Primary Care Giver: Self  Patient Support Systems include: Family Members   Current Financial resources: None  Current community resources: None  Current services prior to admission: Durable Medical Equipment            Current DME: Other (Comment) (quad cane)            Type of Home Care services:  None    ADLS  Prior functional level: Independent in ADLs/IADLs  Current functional level: Mobility (needs ARU)    PT AM-PAC: 6 /24  OT AM-PAC: 9 /24    Family can provide assistance at DC: No  Would you like Case Management to discuss the discharge plan with any other family members/significant others, and if so, who? Yes (w/emergency contacts)  Plans to Return to Present Housing: Other (see comment) (needs ARU first)  Other Identified Issues/Barriers to RETURNING to current housing: None  Potential Assistance needed at discharge: Other (Comment) (ARU)            Potential DME:

## 2024-11-01 NOTE — PROGRESS NOTES
[facility] - Inpatient Rehabilitation Department   Phone: [phone]    Physical Therapy    [x] Initial Evaluation            [] Daily Treatment Note         [] Discharge Summary      Patient: Jm Snow   : 1995   MRN: [id number]   Date of Service:  2024  Admitting Diagnosis: Sepsis (HCC)  Current Admission Summary: Debility due to septic shock     History of Present Illness/Hospital Course:  Jm Snow is a 28 y.o. male with PMHx notable for EtOH use disorder, anxiety and HTN who [place] [facility] [place] on , found to have fungenemia of unclear source, possibly secondary to bilateral hip AVN with septic arthritis (however, left hip aspirate cultures with no growth).  He completed 7 days of empiric antibiotics.  Hospital course was complicated by severe TONYA acute renal failure due to ATN, requiring hemodialysis with now recovered renal function.  He additionally developed acute hypoxic respiratory failure secondary to ARDS and aspiration pneumonia requiring intubation on 10/12.  He was transferred to [facility] MICU on 10/12 for further management.  Extubated on 10/18. Hospital course was further complicated by encephalopathy with agitation and psychosis (episodes of hallucination), suspected multifactorial due to metabolic derangements, active infection and possible alcohol withdrawal, as well as ileus.    Hospital Course: Patient was admitted from ARU 10/31 due to fever and recently having been admitted to the ARU after a 5 week hospital stay. He was febrile and tachycardic and did not look good. He went to   and there was a rapid response right about 7 am.  Patient was dropping oxygen sats and having increased 02 requirement.  He was transferred to 3 tower and placed on 15 L of high flow oxygen. At about 830 there was a second rapid response called this time on 3 tower due to the patient not maintaining his oxygen saturation despite the 15 L of oxygen.  The  patient was dropping his sats into the mid 80s. After his first rapid response a CT chest abdomen pelvis was ordered which revealed bilateral pleural effusions and consolidations as well as the potential of a PE.  The results came back just at the time of the second rapid response.  Based on the patient's increased oxygen requirement we placed the patient on heated high flow oxygen 60 L a minute at 100% FiO2 and transferred him to the ICU.  Additionally with the potential for a pulmonary embolism we started the patient on a heparin drip    Past Medical History:  has a past medical history of Acute anxiety, Acute HFrEF (heart failure with reduced ejection fraction) ([facility]), TONYA (acute kidney injury) ([facility]), Anasarca, ARDS (adult respiratory distress syndrome), ATN (acute tubular necrosis) ([facility]), Constipation, ETOH abuse, Fungemia, Ileus (HCC), Prolonged Q-T interval on ECG, and Septic shock ([facility]).  Past Surgical History:  has no past surgical history on file.    Discharge Recommendations: Jm Snow scored a 6/24 on the AM-PAC short mobility form. Current research shows that an AM-PAC score of 17 or less is typically not associated with a discharge to the patient's home setting. Based on the patient's AM-PAC score and their current functional mobility deficits, it is recommended that the patient have 5-7 sessions per week of Physical Therapy at d/c to increase the patient's independence.  At this time, this patient demonstrates complex nursing, medical, and rehabilitative needs, and would benefit from intensive rehabilitation services upon discharge from the Inpatient setting.  This patient demonstrates the ability to participate in and benefit from an intensive therapy program with a coordinated interdisciplinary team approach to foster frequent, structured, and documented communication among disciplines, who will work together to establish, prioritize, and achieve treatment goals. Please see assessment section  date.  Comments:  Balance:  Static Sitting Balance: poor (-): requires max (A) to maintain balance  Dynamic Sitting Balance: poor (-): requires max (A) to maintain balance  Comments: Pt has heavy posterior trunk lean in sitting due to rigidity of trunk. Pt able to lean forward and reach for objects in sitting but only able to maintain position briefly with decreased eccentric control to return to reclined position.      Other Therapeutic Interventions  Supine in bed:   PROM of LE x5-8 reps (requiring increased time to perform due to noted rigidity in BLE)  Bilateral ankle pumps x10   Bilateral AROM of knee x5 (limited ROM ~20 degrees)  (requiring increased time to perform due to noted rigidity in BLE)    Seated in recliner:   Bilateral LAQ x20   Bilateral ankle pumps x20   Bilateral marches x5 (very limited ROM but muscle contraction noted)   Forward trunk lean to reach for ball x10   Shoulder shrugs x10   AROM of cervical spine into rotation and flexion/extension x10 each direction  AAROM of UE     Pt requiring increased time to perform all exercises with mod VC/TC for proper form and pacing.     See OT note for ADL details.   Functional Outcomes  AM-PAC Inpatient Mobility Raw Score : 6              Cognition  Overall Cognitive Status: WFL  Following Commands: follows multi step commands with repetition, follows multi step commands with increased time  Safety Judgement: decreased awareness of need for assistance  Problem Solving: assistance required to generate solutions, assistance required to implement solutions  Insights: decreased awareness of deficits  Initiation: requires cues for all  Sequencing: requires cues for all  Comments: Pt has decrease awareness of deficits with increased time spent explaining proper steps prior to walking, standing, wheelchair mobility. Pt able to verbalize understanding. Pt anxious with movement and require increase time to explain process and benefits   Orientation:    alert and

## 2024-11-01 NOTE — CARE COORDINATION
11/01/24 1622   Readmission Assessment   Number of Days since last admission? 1-7 days   Previous Disposition Acute Rehab  (Patient went from  to our Acute Rehab then acuted off to inpatient hospital here d/t illness - is not a true readmit)   Who is being Interviewed Patient   What was the patient's/caregiver's perception as to why they think they needed to return back to the hospital? Other (Comment)  (Patient needed to be readmitted to the hospital)   Did you visit your Primary Care Physician after you left the hospital, before you returned this time? No   Why weren't you able to visit your PCP? Other (Comment)  (did not leave the hospital)   Did you see a specialist, such as Cardiac, Pulmonary, Orthopedic Physician, etc. after you left the hospital? No   Who advised the patient to return to the hospital? Physician   Does the patient report anything that got in the way of taking their medications? No   In our efforts to provide the best possible care to you and others like you, can you think of anything that we could have done to help you after you left the hospital the first time, so that you might not have needed to return so soon? Other (Comment)  (Not a true readmit - pt acuted off ARU to inpatient floor d/t illness)     Electronically signed by ARSEN Zambrano LSW on 11/1/2024 at 4:24 PM

## 2024-11-01 NOTE — PROGRESS NOTES
[facility] - Inpatient Rehabilitation Department   Phone: [phone]    Occupational Therapy    [x] Initial Evaluation            [] Daily Treatment Note         [] Discharge Summary      Patient: Jm Snow   : 1995   MRN: [id number]   Date of Service:  2024    Admitting Diagnosis:  Sepsis (HCC)  Current Admission Summary: 28 y.o. male with PMHx notable for EtOH use disorder, anxiety and HTN who presented to [facility] on  with sepsis on , found to have fungenemia of unclear source, possibly secondary to bilateral hip AVN with septic arthritis (however, left hip aspirate cultures with no growth).  He completed 7 days of empiric antibiotics.  Hospital course was complicated by severe TONYA acute renal failure due to ATN, requiring hemodialysis with now recovered renal function.  He additionally developed acute hypoxic respiratory failure secondary to ARDS and aspiration pneumonia requiring intubation on 10/12.  He was transferred to [facility] MICU on 10/12 for further management.  Extubated on 10/18. Hospital course was further complicated by encephalopathy with agitation and psychosis (episodes of hallucination), suspected multifactorial due to metabolic derangements, active infection and possible alcohol withdrawal, as well as ileus. Admitted to ARU but required discharge to ICU after multiple rapid responses, tachycardia, with concern for PE     Past Medical History:  has a past medical history of Acute anxiety, Acute HFrEF (heart failure with reduced ejection fraction) ([facility]), TONYA (acute kidney injury) ([facility]), Anasarca, ARDS (adult respiratory distress syndrome), ATN (acute tubular necrosis) ([facility]), Constipation, ETOH abuse, Fungemia, Ileus ([facility]), Prolonged Q-T interval on ECG, and Septic shock ([facility]).  Past Surgical History:  has no past surgical history on file.    Discharge Recommendations: Jm Snow scored a 9/24 on the AM-PAC ADL Inpatient form. Current research shows that an

## 2024-11-01 NOTE — CONSULTS
Several attempts to be made to see the patient today throughout the morning.  Patient has had many hospital staff in the room and each encounter.  Will reattempt to see the patient later today or tomorrow as time allows.  It is understood that the patient may require possible biopsy pending evaluation of the lesion.  Will plan for biopsy Monday pending physical exam.    Discussed with COOPER Grove DPM   Chief Podiatric Resident PGY3  [employer]  11/1/2024, 10:53 AM

## 2024-11-01 NOTE — PROGRESS NOTES
Hospitalist Progress Note    Name:  Jm Snow    /Age/Sex: 1995  (28 y.o. male)  MRN & CSN:  [id number] & [id number]    PCP: No primary care provider on file.    Date of Admission: 10/31/2024    Patient Status:  Inpatient     Chief Complaint: No chief complaint on file.      Hospital Course: Patient was admitted from ARU 10/31 due to fever and recently having been admitted to the ARU after a 5 week hospital stay. He was febrile and tachycardic and did not look good.  He went to   and there was a rapid response right about 7 am.  Patient was dropping oxygen sats and having increased 02 requirement.  He was transferred to 3 Tiger and placed on 15 L of high flow oxygen.  At about 830 there was a second rapid response called this time on 3 tow due to the patient not maintaining his oxygen saturation despite the 15 L of oxygen.  The patient was dropping his sats into the mid 80s.  After his first rapid response a CT chest abdomen pelvis was ordered which revealed bilateral pleural effusions and consolidations as well as the potential of a PE.  The results came back just at the time of the second rapid response.  Based on the patient's increased oxygen requirement we placed the patient on heated high flow oxygen 60 L a minute at 100% FiO2 and transferred him to the ICU.  Additionally with the potential for a pulmonary embolism we started the patient on a heparin drip    Subjective:  Today is:  Hospital Day: 2.  Patient seen and examined in ICU-3916/3916-.     Patient is awake and alert he does appear to have some tremor and possibly some shivers.  He is alert and able to answer questions  He was still trying to refuse a martinez but we need it to keep track of urine output.  NG tube is in place.    Infusion Medications    sodium chloride Stopped (10/31/24 6377)    heparin (PORCINE) Infusion 20 Units/kg/hr (24 1023)     Scheduled Medications    sodium chloride flush  10 mL IntraVENous 2 times  his recent hospital stay that was lengthy as well as having had fungemia during that hospital stay the patient has been started on broad-spectrum antibiotics as IV antifungals.  Patient is currently on Levaquin, Zyvox and micafungin, ID is consulted  The patient does have some lower extremity edema and based off of this we have not given him sepsis protocol fluids however he is getting a fluid bolus at this time.  Getting some albumin and lasix  Salvador placed for fluid management     Acute respiratory failure/shortness of breath  -Multifactorial to include pulmonary embolism, pleural effusions and multifocal pneumonia.  -Patient has been started on IV antibiotics as noted above  -He has been started on a heparin drip  -He has been able to come off of heated high flow and is now on 2 liters NC    Multifocal pneumonia  -Antibiotics as noted above given his recent hospitalization we do need to cover for hospital-associated infections    Ileus  -Abdomen is distended and based on the CT he appears to have most likely an ileus.  -An NG tube been placed for decompression,.  - he did have some BMs yesterday,  -Patient did have an ileus during his previous hospital stay at .  -Patient has been having some diarrhea and the C. difficile workup was negative  - continue ng tube for now.       Elevated TSH  -The patient's TSH came back very elevated in the 12 range  -Based off of this he likely is hypothyroid  -No reflex T4 was noted so we have ordered a T4 level.  -Consider starting Synthroid in the near future    Hypomagnesemia  - replace    History of alcohol and substance abuse  -Patient has the body habitus and sequela of an alcoholic.  -Of note his urine tox screen was positive for cocaine at his  stay.  -At this point he would be through any type of withdrawal that he may have had to go through so we will not be starting him on any CIWA scale      Discussed management of the case with pulmonary critical care who

## 2024-11-01 NOTE — DISCHARGE INSTR - COC
Continuity of Care Form    Patient Name: Jm Snow   :  1995  MRN:  [id number]    Admit date:  10/31/2024  Discharge date:  2024    Code Status Order: Full Code   Advance Directives:   Advance Care Flowsheet Documentation             Admitting Physician:  Girma Metz DO  PCP: No primary care provider on file.    Discharging Nurse: Matthew   Discharging Hospital Unit/Room#: 0V-8620   Discharging Unit Phone Number: [phone]    Emergency Contact:   No emergency contact information on file.    Past Surgical History:  History reviewed. No pertinent surgical history.    Immunization History:   Immunization History   Administered Date(s) Administered    COVID-19, PFIZER PURPLE top, DILUTE for use, (age 12 y+), 30mcg/0.3mL 2021, 2021       Active Problems:  Patient Active Problem List   Diagnosis Code    Septic shock (HCC) A41.9, R65.21    Sepsis ([facility]) A41.9    Shortness of breath R06.02    Swelling of lower extremity M79.89    Cocaine abuse (HCC) F14.10    Overweight (BMI 25.0-29.9) E66.3    History of alcoholism ([facility]) F10.21    Avascular necrosis of bone of hip M87.059    Multifocal pneumonia J18.9    Acute respiratory failure with hypoxia J96.01       Isolation/Infection:   Isolation            No Isolation          Patient Infection Status       None to display                     Nurse Assessment:  Last Vital Signs: BP (!) 88/58   Pulse (!) 120   Temp 98.2 °F (36.8 °C) (Oral)   Resp 24   Ht 1.93 m (6' 4\")   Wt 114.3 kg (252 lb)   SpO2 95%   BMI 30.67 kg/m²     Last documented pain score (0-10 scale): Pain Level: 0  Last Weight:   Wt Readings from Last 1 Encounters:   24 114.3 kg (252 lb)     Mental Status:  oriented    IV Access:  - Peripheral IV - site  L. Forearm, R. Forearm, insertion date: 10/31/2024    Nursing Mobility/ADLs:  Walking   Dependent  Transfer  Dependent  Bathing  Dependent  Dressing  Dependent  Toileting  Dependent  Feeding  Assisted   Med Admin

## 2024-11-01 NOTE — PROGRESS NOTES
Speech Language Pathology  HOLD    Jm Snow  1995    SLP eval and treat orders received. Attempted to see pt for dysphagia evaluation. Per discussion with RN, pt remains NPO with NG for decompression. Will hold evaluation this date and attempt to follow-up as schedule allows.     Thank you,     Marty Hodges M.A., CCC-SLP [id number]  Speech-Language Pathologist

## 2024-11-02 ENCOUNTER — APPOINTMENT (OUTPATIENT)
Dept: GENERAL RADIOLOGY | Age: 29
DRG: 871 | End: 2024-11-02
Attending: INTERNAL MEDICINE
Payer: COMMERCIAL

## 2024-11-02 LAB
ABO + RH BLD: NORMAL
ABO + RH BLD: NORMAL
ALBUMIN SERPL-MCNC: 2.4 G/DL (ref 3.4–5)
ALBUMIN/GLOB SERPL: 1.1 {RATIO} (ref 1.1–2.2)
ALP SERPL-CCNC: 78 U/L (ref 40–129)
ALT SERPL-CCNC: 17 U/L (ref 10–40)
ANION GAP SERPL CALCULATED.3IONS-SCNC: 11 MMOL/L (ref 3–16)
ANTI-XA UNFRAC HEPARIN: 0.23 IU/ML (ref 0.3–0.7)
ANTI-XA UNFRAC HEPARIN: 0.48 IU/ML (ref 0.3–0.7)
ANTI-XA UNFRAC HEPARIN: 0.49 IU/ML (ref 0.3–0.7)
AST SERPL-CCNC: 21 U/L (ref 15–37)
BILIRUB SERPL-MCNC: 0.3 MG/DL (ref 0–1)
BLD GP AB SCN SERPL QL: NORMAL
BLOOD BANK DISPENSE STATUS: NORMAL
BLOOD BANK PRODUCT CODE: NORMAL
BPU ID: NORMAL
BUN SERPL-MCNC: 23 MG/DL (ref 7–20)
CALCIUM SERPL-MCNC: 6.8 MG/DL (ref 8.3–10.6)
CHLORIDE SERPL-SCNC: 106 MMOL/L (ref 99–110)
CO2 SERPL-SCNC: 19 MMOL/L (ref 21–32)
CREAT SERPL-MCNC: 1.1 MG/DL (ref 0.9–1.3)
DESCRIPTION BLOOD BANK: NORMAL
EKG ATRIAL RATE: 120 BPM
EKG DIAGNOSIS: NORMAL
EKG Q-T INTERVAL: 428 MS
EKG QRS DURATION: 86 MS
EKG QTC CALCULATION (BAZETT): 612 MS
EKG R AXIS: 90 DEGREES
EKG T AXIS: 48 DEGREES
EKG VENTRICULAR RATE: 123 BPM
GFR SERPLBLD CREATININE-BSD FMLA CKD-EPI: >90 ML/MIN/{1.73_M2}
GLUCOSE SERPL-MCNC: 83 MG/DL (ref 70–99)
HCT VFR BLD AUTO: 24.3 % (ref 40.5–52.5)
HGB BLD-MCNC: 8 G/DL (ref 13.5–17.5)
MAGNESIUM SERPL-MCNC: 1.62 MG/DL (ref 1.8–2.4)
POTASSIUM SERPL-SCNC: 2.6 MMOL/L (ref 3.5–5.1)
POTASSIUM SERPL-SCNC: 2.6 MMOL/L (ref 3.5–5.1)
PROT SERPL-MCNC: 4.6 G/DL (ref 6.4–8.2)
SODIUM SERPL-SCNC: 136 MMOL/L (ref 136–145)

## 2024-11-02 PROCEDURE — 86901 BLOOD TYPING SEROLOGIC RH(D): CPT

## 2024-11-02 PROCEDURE — 85025 COMPLETE CBC W/AUTO DIFF WBC: CPT

## 2024-11-02 PROCEDURE — 85018 HEMOGLOBIN: CPT

## 2024-11-02 PROCEDURE — 96375 TX/PRO/DX INJ NEW DRUG ADDON: CPT

## 2024-11-02 PROCEDURE — 30233N1 TRANSFUSION OF NONAUTOLOGOUS RED BLOOD CELLS INTO PERIPHERAL VEIN, PERCUTANEOUS APPROACH: ICD-10-PCS | Performed by: INTERNAL MEDICINE

## 2024-11-02 PROCEDURE — 2580000003 HC RX 258: Performed by: INTERNAL MEDICINE

## 2024-11-02 PROCEDURE — 83735 ASSAY OF MAGNESIUM: CPT

## 2024-11-02 PROCEDURE — G0378 HOSPITAL OBSERVATION PER HR: HCPCS

## 2024-11-02 PROCEDURE — 74018 RADEX ABDOMEN 1 VIEW: CPT

## 2024-11-02 PROCEDURE — 96376 TX/PRO/DX INJ SAME DRUG ADON: CPT

## 2024-11-02 PROCEDURE — 96366 THER/PROPH/DIAG IV INF ADDON: CPT

## 2024-11-02 PROCEDURE — 6360000002 HC RX W HCPCS: Performed by: INTERNAL MEDICINE

## 2024-11-02 PROCEDURE — 94761 N-INVAS EAR/PLS OXIMETRY MLT: CPT

## 2024-11-02 PROCEDURE — 94669 MECHANICAL CHEST WALL OSCILL: CPT

## 2024-11-02 PROCEDURE — 99233 SBSQ HOSP IP/OBS HIGH 50: CPT | Performed by: INTERNAL MEDICINE

## 2024-11-02 PROCEDURE — 93010 ELECTROCARDIOGRAM REPORT: CPT | Performed by: INTERNAL MEDICINE

## 2024-11-02 PROCEDURE — P9016 RBC LEUKOCYTES REDUCED: HCPCS

## 2024-11-02 PROCEDURE — 84132 ASSAY OF SERUM POTASSIUM: CPT

## 2024-11-02 PROCEDURE — 2000000000 HC ICU R&B

## 2024-11-02 PROCEDURE — 6360000002 HC RX W HCPCS: Performed by: STUDENT IN AN ORGANIZED HEALTH CARE EDUCATION/TRAINING PROGRAM

## 2024-11-02 PROCEDURE — 85014 HEMATOCRIT: CPT

## 2024-11-02 PROCEDURE — 80053 COMPREHEN METABOLIC PANEL: CPT

## 2024-11-02 PROCEDURE — P9047 ALBUMIN (HUMAN), 25%, 50ML: HCPCS | Performed by: INTERNAL MEDICINE

## 2024-11-02 PROCEDURE — 6360000002 HC RX W HCPCS: Performed by: NURSE PRACTITIONER

## 2024-11-02 PROCEDURE — 86923 COMPATIBILITY TEST ELECTRIC: CPT

## 2024-11-02 PROCEDURE — 36415 COLL VENOUS BLD VENIPUNCTURE: CPT

## 2024-11-02 PROCEDURE — 85520 HEPARIN ASSAY: CPT

## 2024-11-02 PROCEDURE — 96368 THER/DIAG CONCURRENT INF: CPT

## 2024-11-02 PROCEDURE — 36430 TRANSFUSION BLD/BLD COMPNT: CPT

## 2024-11-02 PROCEDURE — 86850 RBC ANTIBODY SCREEN: CPT

## 2024-11-02 PROCEDURE — 86900 BLOOD TYPING SEROLOGIC ABO: CPT

## 2024-11-02 RX ORDER — FUROSEMIDE 10 MG/ML
20 INJECTION INTRAMUSCULAR; INTRAVENOUS ONCE
Status: COMPLETED | OUTPATIENT
Start: 2024-11-02 | End: 2024-11-02

## 2024-11-02 RX ORDER — SODIUM CHLORIDE 9 MG/ML
INJECTION, SOLUTION INTRAVENOUS PRN
Status: DISCONTINUED | OUTPATIENT
Start: 2024-11-02 | End: 2024-11-07 | Stop reason: HOSPADM

## 2024-11-02 RX ORDER — ALBUMIN (HUMAN) 12.5 G/50ML
25 SOLUTION INTRAVENOUS ONCE
Status: COMPLETED | OUTPATIENT
Start: 2024-11-02 | End: 2024-11-02

## 2024-11-02 RX ORDER — MAGNESIUM SULFATE IN WATER 40 MG/ML
4000 INJECTION, SOLUTION INTRAVENOUS ONCE
Status: COMPLETED | OUTPATIENT
Start: 2024-11-02 | End: 2024-11-02

## 2024-11-02 RX ADMIN — POTASSIUM CHLORIDE 10 MEQ: 10 INJECTION, SOLUTION INTRAVENOUS at 17:28

## 2024-11-02 RX ADMIN — LINEZOLID 600 MG: 600 INJECTION, SOLUTION INTRAVENOUS at 02:38

## 2024-11-02 RX ADMIN — POTASSIUM CHLORIDE 10 MEQ: 10 INJECTION, SOLUTION INTRAVENOUS at 21:44

## 2024-11-02 RX ADMIN — POTASSIUM CHLORIDE 10 MEQ: 10 INJECTION, SOLUTION INTRAVENOUS at 06:14

## 2024-11-02 RX ADMIN — MICAFUNGIN SODIUM 100 MG: 100 INJECTION, POWDER, LYOPHILIZED, FOR SOLUTION INTRAVENOUS at 16:14

## 2024-11-02 RX ADMIN — POTASSIUM CHLORIDE 10 MEQ: 10 INJECTION, SOLUTION INTRAVENOUS at 07:50

## 2024-11-02 RX ADMIN — HEPARIN SODIUM 22 UNITS/KG/HR: 10000 INJECTION, SOLUTION INTRAVENOUS at 23:00

## 2024-11-02 RX ADMIN — POTASSIUM CHLORIDE 10 MEQ: 10 INJECTION, SOLUTION INTRAVENOUS at 03:42

## 2024-11-02 RX ADMIN — Medication 10 ML: at 20:39

## 2024-11-02 RX ADMIN — FUROSEMIDE 20 MG: 10 INJECTION, SOLUTION INTRAMUSCULAR; INTRAVENOUS at 14:28

## 2024-11-02 RX ADMIN — SODIUM CHLORIDE, PRESERVATIVE FREE 0.5 MG: 5 INJECTION INTRAVENOUS at 10:36

## 2024-11-02 RX ADMIN — POTASSIUM CHLORIDE 10 MEQ: 10 INJECTION, SOLUTION INTRAVENOUS at 18:22

## 2024-11-02 RX ADMIN — POTASSIUM CHLORIDE 10 MEQ: 10 INJECTION, SOLUTION INTRAVENOUS at 20:38

## 2024-11-02 RX ADMIN — POTASSIUM CHLORIDE 10 MEQ: 10 INJECTION, SOLUTION INTRAVENOUS at 01:34

## 2024-11-02 RX ADMIN — PANTOPRAZOLE SODIUM 40 MG: 40 INJECTION, POWDER, FOR SOLUTION INTRAVENOUS at 10:08

## 2024-11-02 RX ADMIN — POTASSIUM CHLORIDE 10 MEQ: 10 INJECTION, SOLUTION INTRAVENOUS at 00:32

## 2024-11-02 RX ADMIN — LEVOFLOXACIN 750 MG: 750 INJECTION, SOLUTION INTRAVENOUS at 17:13

## 2024-11-02 RX ADMIN — ALBUMIN (HUMAN) 25 G: 0.25 INJECTION, SOLUTION INTRAVENOUS at 14:32

## 2024-11-02 RX ADMIN — POTASSIUM CHLORIDE 10 MEQ: 10 INJECTION, SOLUTION INTRAVENOUS at 22:56

## 2024-11-02 RX ADMIN — HEPARIN SODIUM 4400 UNITS: 1000 INJECTION INTRAVENOUS; SUBCUTANEOUS at 04:32

## 2024-11-02 RX ADMIN — HEPARIN SODIUM 22 UNITS/KG/HR: 10000 INJECTION, SOLUTION INTRAVENOUS at 11:01

## 2024-11-02 RX ADMIN — MAGNESIUM SULFATE HEPTAHYDRATE 4000 MG: 40 INJECTION, SOLUTION INTRAVENOUS at 09:54

## 2024-11-02 RX ADMIN — POTASSIUM CHLORIDE 10 MEQ: 10 INJECTION, SOLUTION INTRAVENOUS at 19:34

## 2024-11-02 RX ADMIN — Medication 10 ML: at 10:06

## 2024-11-02 RX ADMIN — POTASSIUM CHLORIDE 10 MEQ: 10 INJECTION, SOLUTION INTRAVENOUS at 02:34

## 2024-11-02 RX ADMIN — LINEZOLID 600 MG: 600 INJECTION, SOLUTION INTRAVENOUS at 14:35

## 2024-11-02 ASSESSMENT — PAIN SCALES - GENERAL
PAINLEVEL_OUTOF10: 0

## 2024-11-02 NOTE — PROGRESS NOTES
Podiatric Surgery Daily Progress Note  Jm Snow      Subjective :   Patient seen and examined this am at the bedside with family member. Patient and family state that the lesion on the left great toe started when he was admitted at   about a month ago. At that time the patient was evaluated by the podiatric team there and it was explained that because the patient was on pressors and different medications it was possible that the great toe had a clot in the small vessels causing a necrotic area of the toe. The patient denies any pain to the area and denies any changes to the lesion since it first was noticed. Patient denies N/V/F/C/SOB. Patient denies calf pain, thigh pain, chest pain.     Review of Systems: A 12 point review of symptoms is unremarkable with the exception of the chief complaint. Patient specifically denies nausea, fever, vomiting, chills, shortness of breath, chest pain, abdominal pain, constipation or difficulty urinating.       Objective     BP 93/68   Pulse (!) 112   Temp 99 °F (37.2 °C) (Temporal)   Resp 24   Ht 1.93 m (6' 4\")   Wt 112.2 kg (247 lb 5.7 oz)   SpO2 96%   BMI 30.11 kg/m²      I/O:  Intake/Output Summary (Last 24 hours) at 11/2/2024 1141  Last data filed at 11/2/2024 1000  Gross per 24 hour   Intake 1859.29 ml   Output 2205 ml   Net -345.71 ml              Wt Readings from Last 3 Encounters:   11/02/24 112.2 kg (247 lb 5.7 oz)   10/30/24 112.8 kg (248 lb 10.9 oz)       LABS:    Recent Labs     11/01/24  0800 11/02/24  0355   WBC 2.6* 1.9*   HGB 7.4* 6.5*   HCT 23.1* 20.2*    156        Recent Labs     10/31/24  0036 10/31/24  0037 11/02/24  0355   NA  --    < > 136   K  --    < > 2.6*   CL  --    < > 106   CO2  --    < > 19*   PHOS 3.9  --   --    BUN  --    < > 23*   CREATININE  --    < > 1.1    < > = values in this interval not displayed.        Recent Labs     10/31/24  0903   INR 1.27*   APTT 30.0           LOWER EXTREMITY EXAMINATION    No dressing to b/l  0.8 --> 2.1  -Blood cultures 1 & 2 NGTD  -Venous duplex reviewed; impressions above. No further imagine necessary.  -Wound culture not obtained at this time 2/2 no active drainage  -No antibiotics warranted from a podiatric standpoint 2/2 no acute signs of infection  -Betadine paint to left hallux distal tuft and 4th toe  -Prevalon boots reapplied. Patient is to wear at all times while in bed to prevent further deep tissue injury.  -Patient may be weightbearing as tolerated  -No concern for melanoma at this point. Do not recommend biopsy at this time and will continue to follow patient in the outpatient setting.    DISPO: Dry stable eschar, left hallux and 4th toe. All labs reviewed and venous duplex reviewed; impressions noted above. No further imaging necessary. No antibiotics from podiatric stand point. No need for biopsy at this time as lesion is likely secondary to pressors and unlikely malignant in nature. Do not recommend biopsy at this time and will continue to follow patient in the outpatient setting. Continue with betadine paint daily to keep dry and stable. Please continue to wear preavalon boots in bed to prevent heel ulceration.     Discussed assessment and plan with Dr. Bebo Em DPM.    Harry Reza DPM   Podiatric Resident PGY2  [employer]  11/2/2024, 11:41 AM

## 2024-11-02 NOTE — CONSENT
Informed Consent for Blood Component Transfusion Note    I have discussed with the father the rationale for blood component transfusion; its benefits in treating or preventing fatigue, organ damage, or death; and its risk which includes mild transfusion reactions, rare risk of blood borne infection, or more serious but rare reactions. I have discussed the alternatives to transfusion, including the risk and consequences of not receiving transfusion. The father had an opportunity to ask questions and had agreed to proceed with transfusion of blood components.    Electronically signed by Misael Alonso MD on 11/2/24 at 9:44 AM EDT

## 2024-11-02 NOTE — PROGRESS NOTES
Speech Language Pathology  HOLD/Attempts     Jm Snow  1995     09:16AM: SLP eval and treat orders received on 10/31/24. Attempted to see pt for dysphagia evaluation. Per discussion with RN, pt remains NPO with NG for decompression. Will hold evaluation this date and discharge the order for dysphagia evaluation until pt is appropriate to complete.     12:38PM: New SLP eval and treat orders received with orders for \"clear liquid diet if passes speech eval.\" Attempted to see pt for dysphagia evaluation, pt's chart reviewed and consult completed with pt's RN. Pt's RN reports that he has received Ativan and is currently sleeping, not appropriate to complete dysphagia evaluation. Will re-attempt as schedule allows and pt appropriate.        Charlotte Angulo M.S. CCC-SLP #[id number]  Speech-Language Pathologist

## 2024-11-02 NOTE — DISCHARGE INSTRUCTIONS
Podiatry Discharge Wound Care Instructions:  -Please continue to wear prevalon boots or float heels off of the bed to prevent soft tissue breakdown of the heels  -Please paint the left hallux and 4th toe with betadine daily to keep dry

## 2024-11-02 NOTE — PROGRESS NOTES
PULMONARY AND CRITICAL CARE MEDICINE PROGRESS NOTE    Subjective: Patient is on 1 L nasal cannula and saturating well.  Improving creatinine.  Good urine output.  Hemoglobin 6.5 today.  No evidence of bleeding.  Abdomen is less distended today.  Patient is having bowel movements.  Patient states that he is anxious.    REVIEW OF SYSTEMS:   Constitutional symptoms: The patient denies fever, fatigue, night sweats, weight loss or weight gain.   HEENT: No vision changes. No tinnitus, Denies sinus pain. No hoarseness, or dysphagia.   Neck: Patient denies swelling in the neck.   Cardiovascular: Denies chest pain, palpitation, syncope.  Respiratory: Denies shortness of breath or cough.   Gastrointestinal: Denies nausea, abdominal pain or change in bowel function.  Genitourinary: Denies obstructive symptoms. No history of incontinence.  Skin: No rashes or itching.   Muskuloskeletal: Denies weakness or bone pain.   Neurological: No headaches or seizures.   Psychiatric: Denies mood swings or depression.     MEDICATIONS:     Scheduled Meds:   magnesium sulfate  4,000 mg IntraVENous Once    sodium chloride flush  10 mL IntraVENous 2 times per day    [Held by provider] escitalopram  20 mg Oral Daily    [Held by provider] valsartan  80 mg Oral Daily    linezolid  600 mg IntraVENous Q12H    pantoprazole  40 mg IntraVENous Daily    sodium chloride  1,000 mL IntraVENous Once    levofloxacin  750 mg IntraVENous Q24H    micafungin  100 mg IntraVENous Q24H       Current Infusions:    sodium chloride      sodium chloride Stopped (10/31/24 1407)    heparin (PORCINE) Infusion 22 Units/kg/hr (11/02/24 1101)       PRN meds:  sodium chloride, LORazepam, potassium chloride **OR** potassium alternative oral replacement **OR** potassium chloride, sodium chloride flush, sodium chloride, promethazine **OR** ondansetron, melatonin, nicotine, acetaminophen **OR** acetaminophen, [Held by provider] zolpidem, heparin (porcine), heparin (porcine),  decompression.  Will try speech evaluation and possibly clear liquid diet today if passes speech.  Patient also complained of diarrhea.  C. difficile is negative.     TONYA, creatinine improved to 1.1.  Patient has diffuse anasarca with lower extremity edema.  Will give another dose of albumin with Lasix 40 mg IV x 1.  Replace electrolytes.     Zyvox, Levaquin and micafungin for broad-spectrum coverage per ID.  MRSA nares negative.  Blood cultures negative.  Legionella and streptococcal urine antigen negative.  C. difficile negative.     Elevated TSH.  Normal free T4 levels     History of alcohol abuse.  UDS was also positive for cocaine at .     Anemia, hemoglobin dropped to 6.5.  1 unit PRBC transfusion.  No evidence of bleeding.  Leukopenia with white cell count 1.9  Patient has anemia and leukopenia likely secondary alcohol abuse     Protonix for stress ulcer prophylaxis.    Out of bed in chair.  PT OT.      Miranda Morris MD  Pulmonary Critical Care and Sleep Medicine  11/2/2024, 11:09 AM    This note was completed using dragon medical speech recognition software. Grammatical errors, random word insertions, pronoun errors and incomplete sentences are occasional consequences of this technology due to software limitations. If there are questions or concerns about the content of this note of information contained within the body of this dictation they should be addressed with the provider for clarification.

## 2024-11-02 NOTE — PROGRESS NOTES
Hospitalist Progress Note    Name:  Jm Snow    /Age/Sex: 1995  (28 y.o. male)  MRN & CSN:  [id number] & [id number]    PCP: No primary care provider on file.    Date of Admission: 10/31/2024    Patient Status:  Inpatient     Chief Complaint: No chief complaint on file.      Hospital Course: Patient was admitted from ARU 10/31 due to fever and recently having been admitted to the ARU after a 5 week hospital stay. He was febrile and tachycardic and did not look good.  He went to   and there was a rapid response right about 7 am.  Patient was dropping oxygen sats and having increased 02 requirement.  He was transferred to [address] [place] and placed on 15 L of high flow oxygen.  At about 830 there was a second rapid response called this time on [address] [place] due to the patient not maintaining his oxygen saturation despite the 15 L of oxygen.  The patient was dropping his sats into the mid 80s.  After his first rapid response a CT chest abdomen pelvis was ordered which revealed bilateral pleural effusions and consolidations as well as the potential of a PE.  The results came back just at the time of the second rapid response.  Based on the patient's increased oxygen requirement we placed the patient on heated high flow oxygen 60 L a minute at 100% FiO2 and transferred him to the ICU.  Additionally with the potential for a pulmonary embolism we started the patient on a heparin drip    Subjective:  Today is:  Hospital Day: 3.  Patient seen and examined in ICU-3916/3916-01.     Patient is awake and alert he does appear to have some tremor and possibly some shivers.  He is alert and able to answer questions  He was still trying to refuse a martinez but we need it to keep track of urine output.  NG tube is in place.    Infusion Medications    sodium chloride      sodium chloride Stopped (10/31/24 1407)    heparin (PORCINE) Infusion 22 Units/kg/hr (24 1101)     Scheduled Medications    albumin human 25%  25 g  off of this he likely is hypothyroid  -No reflex T4 was noted so we have ordered a T4 level.  -Consider starting Synthroid in the near future    Hypomagnesemia  - replace    History of alcohol and substance abuse  -Patient has the body habitus and sequela of an alcoholic.  -Of note his urine tox screen was positive for cocaine at his  stay.  -At this point he would be through any type of withdrawal that he may have had to go through so we will not be starting him on any CIWA scale    Anemia  - transfuse for hg less than 7    Discussed management of the case with pulmonary critical care who recommended martinez      Drugs that require monitoring for toxicity include: Heparin and the method of monitoring was/is Anti-Xa    DVT ppx: Heparin  GI ppx: PPI  Diet: ADULT DIET; Clear Liquid  Code Status: Full Code    PT/OT Eval Status: Eval and treat    Disposition:  The patient remains critically ill he is being treated for sepsis  Albumin and lasix  He has been started on broad-spectrum antibiotics he is not currently requiring pressor support  He is improved in need for oxygen  Total critical care time spent is 31minutes.      Misael Alonso MD  11/2/2024  3:24 PM

## 2024-11-02 NOTE — PLAN OF CARE
Problem: Discharge Planning  Goal: Discharge to home or other facility with appropriate resources  Outcome: Progressing  Flowsheets (Taken 11/1/2024 2323)  Discharge to home or other facility with appropriate resources: Identify discharge learning needs (meds, wound care, etc)     Problem: Safety - Adult  Goal: Free from fall injury  11/1/2024 2323 by Inna Milner, RN  Outcome: Progressing  Flowsheets (Taken 11/1/2024 2323)  Free From Fall Injury:   Instruct family/caregiver on patient safety   Based on caregiver fall risk screen, instruct family/caregiver to ask for assistance with transferring infant if caregiver noted to have fall risk factors  11/1/2024 1626 by Nicolas Olivera, RN  Outcome: Progressing     Problem: Skin/Tissue Integrity  Goal: Absence of new skin breakdown  Description: 1.  Monitor for areas of redness and/or skin breakdown  2.  Assess vascular access sites hourly  3.  Every 4-6 hours minimum:  Change oxygen saturation probe site  4.  Every 4-6 hours:  If on nasal continuous positive airway pressure, respiratory therapy assess nares and determine need for appliance change or resting period.  11/1/2024 2323 by Inna Milner, RN  Outcome: Progressing  11/1/2024 1626 by Nicolas Olivera, RN  Outcome: Progressing

## 2024-11-02 NOTE — PROGRESS NOTES
Incentive Spirometry education and demonstration completed by Respiratory Therapy Yes      Response to education: Good     Teaching Time: 5 minutes    Minimum Predicted Vital Capacity - 868 mL.  Patient's Actual Vital Capacity - 750 mL. Turning over to Nursing for routine follow-up No.    Comments:     Electronically signed by Clif Green RCP on 11/2/2024 at 8:52 AM

## 2024-11-02 NOTE — PROGRESS NOTES
Critical results received from lab via telephone as follows:  WBC 1.9  Hemoglobin 6.5  Hematocrit 20.2  NP Ksenia Israel-Marker notified via [employer] at 0447  NP Ksenia Israel-Marker notified via [employer] at 0457  NP Ksenia Israel-Marker read messages @0504. No new orders placed at this time.   Notified  via [employer] at 0530. Message read @0532. No new orders as of 0600.

## 2024-11-03 LAB
ALBUMIN SERPL-MCNC: 2.9 G/DL (ref 3.4–5)
ALBUMIN/GLOB SERPL: 1.4 {RATIO} (ref 1.1–2.2)
ALP SERPL-CCNC: 91 U/L (ref 40–129)
ALT SERPL-CCNC: 17 U/L (ref 10–40)
AMMONIA PLAS-SCNC: 32 UMOL/L (ref 16–60)
ANION GAP SERPL CALCULATED.3IONS-SCNC: 10 MMOL/L (ref 3–16)
ANTI-XA UNFRAC HEPARIN: 0.45 IU/ML (ref 0.3–0.7)
AST SERPL-CCNC: 32 U/L (ref 15–37)
BASOPHILS # BLD: 0 K/UL (ref 0–0.2)
BASOPHILS # BLD: 0 K/UL (ref 0–0.2)
BASOPHILS NFR BLD: 0.7 %
BASOPHILS NFR BLD: 1.2 %
BILIRUB SERPL-MCNC: 0.4 MG/DL (ref 0–1)
BUN SERPL-MCNC: 15 MG/DL (ref 7–20)
CALCIUM SERPL-MCNC: 7.4 MG/DL (ref 8.3–10.6)
CHLORIDE SERPL-SCNC: 108 MMOL/L (ref 99–110)
CO2 SERPL-SCNC: 20 MMOL/L (ref 21–32)
CREAT SERPL-MCNC: 0.8 MG/DL (ref 0.9–1.3)
DEPRECATED RDW RBC AUTO: 15 % (ref 12.4–15.4)
DEPRECATED RDW RBC AUTO: 15.1 % (ref 12.4–15.4)
EOSINOPHIL # BLD: 0.2 K/UL (ref 0–0.6)
EOSINOPHIL # BLD: 0.4 K/UL (ref 0–0.6)
EOSINOPHIL NFR BLD: 12.8 %
EOSINOPHIL NFR BLD: 17.3 %
GFR SERPLBLD CREATININE-BSD FMLA CKD-EPI: >90 ML/MIN/{1.73_M2}
GLUCOSE SERPL-MCNC: 83 MG/DL (ref 70–99)
HCT VFR BLD AUTO: 20.2 % (ref 40.5–52.5)
HCT VFR BLD AUTO: 24.4 % (ref 40.5–52.5)
HGB BLD-MCNC: 6.5 G/DL (ref 13.5–17.5)
HGB BLD-MCNC: 7.9 G/DL (ref 13.5–17.5)
LYMPHOCYTES # BLD: 0.2 K/UL (ref 1–5.1)
LYMPHOCYTES # BLD: 0.4 K/UL (ref 1–5.1)
LYMPHOCYTES NFR BLD: 12.9 %
LYMPHOCYTES NFR BLD: 18.2 %
MAGNESIUM SERPL-MCNC: 1.68 MG/DL (ref 1.8–2.4)
MAGNESIUM SERPL-MCNC: 2.42 MG/DL (ref 1.8–2.4)
MCH RBC QN AUTO: 29.7 PG (ref 26–34)
MCH RBC QN AUTO: 29.9 PG (ref 26–34)
MCHC RBC AUTO-ENTMCNC: 32.1 G/DL (ref 31–36)
MCHC RBC AUTO-ENTMCNC: 32.4 G/DL (ref 31–36)
MCV RBC AUTO: 92.3 FL (ref 80–100)
MCV RBC AUTO: 92.3 FL (ref 80–100)
MONOCYTES # BLD: 0.2 K/UL (ref 0–1.3)
MONOCYTES # BLD: 0.3 K/UL (ref 0–1.3)
MONOCYTES NFR BLD: 10.9 %
MONOCYTES NFR BLD: 13.8 %
NEUTROPHILS # BLD: 1.1 K/UL (ref 1.7–7.7)
NEUTROPHILS # BLD: 1.2 K/UL (ref 1.7–7.7)
NEUTROPHILS NFR BLD: 52.4 %
NEUTROPHILS NFR BLD: 59.8 %
PATH INTERP BLD-IMP: YES
PLATELET # BLD AUTO: 156 K/UL (ref 135–450)
PLATELET # BLD AUTO: 201 K/UL (ref 135–450)
PMV BLD AUTO: 9.4 FL (ref 5–10.5)
PMV BLD AUTO: 9.5 FL (ref 5–10.5)
POTASSIUM SERPL-SCNC: 2.5 MMOL/L (ref 3.5–5.1)
POTASSIUM SERPL-SCNC: 2.6 MMOL/L (ref 3.5–5.1)
PROT SERPL-MCNC: 5 G/DL (ref 6.4–8.2)
RBC # BLD AUTO: 2.19 M/UL (ref 4.2–5.9)
RBC # BLD AUTO: 2.65 M/UL (ref 4.2–5.9)
SODIUM SERPL-SCNC: 138 MMOL/L (ref 136–145)
T4 FREE SERPL-MCNC: 1.1 NG/DL (ref 0.9–1.8)
WBC # BLD AUTO: 1.9 K/UL (ref 4–11)
WBC # BLD AUTO: 2.3 K/UL (ref 4–11)

## 2024-11-03 PROCEDURE — 80053 COMPREHEN METABOLIC PANEL: CPT

## 2024-11-03 PROCEDURE — 6360000002 HC RX W HCPCS: Performed by: STUDENT IN AN ORGANIZED HEALTH CARE EDUCATION/TRAINING PROGRAM

## 2024-11-03 PROCEDURE — 96376 TX/PRO/DX INJ SAME DRUG ADON: CPT

## 2024-11-03 PROCEDURE — 84132 ASSAY OF SERUM POTASSIUM: CPT

## 2024-11-03 PROCEDURE — 6360000002 HC RX W HCPCS: Performed by: NURSE PRACTITIONER

## 2024-11-03 PROCEDURE — 6360000002 HC RX W HCPCS: Performed by: INTERNAL MEDICINE

## 2024-11-03 PROCEDURE — 94669 MECHANICAL CHEST WALL OSCILL: CPT

## 2024-11-03 PROCEDURE — 82140 ASSAY OF AMMONIA: CPT

## 2024-11-03 PROCEDURE — 92610 EVALUATE SWALLOWING FUNCTION: CPT

## 2024-11-03 PROCEDURE — 94640 AIRWAY INHALATION TREATMENT: CPT

## 2024-11-03 PROCEDURE — 2580000003 HC RX 258: Performed by: INTERNAL MEDICINE

## 2024-11-03 PROCEDURE — G0378 HOSPITAL OBSERVATION PER HR: HCPCS

## 2024-11-03 PROCEDURE — 99233 SBSQ HOSP IP/OBS HIGH 50: CPT | Performed by: INTERNAL MEDICINE

## 2024-11-03 PROCEDURE — 92526 ORAL FUNCTION THERAPY: CPT

## 2024-11-03 PROCEDURE — 94761 N-INVAS EAR/PLS OXIMETRY MLT: CPT

## 2024-11-03 PROCEDURE — 85025 COMPLETE CBC W/AUTO DIFF WBC: CPT

## 2024-11-03 PROCEDURE — 85520 HEPARIN ASSAY: CPT

## 2024-11-03 PROCEDURE — P9047 ALBUMIN (HUMAN), 25%, 50ML: HCPCS | Performed by: INTERNAL MEDICINE

## 2024-11-03 PROCEDURE — 96366 THER/PROPH/DIAG IV INF ADDON: CPT

## 2024-11-03 PROCEDURE — 36415 COLL VENOUS BLD VENIPUNCTURE: CPT

## 2024-11-03 PROCEDURE — 83735 ASSAY OF MAGNESIUM: CPT

## 2024-11-03 PROCEDURE — 2000000000 HC ICU R&B

## 2024-11-03 RX ORDER — SODIUM CHLORIDE 0.9 % (FLUSH) 0.9 %
5-40 SYRINGE (ML) INJECTION EVERY 12 HOURS SCHEDULED
Status: DISCONTINUED | OUTPATIENT
Start: 2024-11-03 | End: 2024-11-07 | Stop reason: HOSPADM

## 2024-11-03 RX ORDER — SODIUM CHLORIDE 0.9 % (FLUSH) 0.9 %
5-40 SYRINGE (ML) INJECTION PRN
Status: DISCONTINUED | OUTPATIENT
Start: 2024-11-03 | End: 2024-11-07 | Stop reason: HOSPADM

## 2024-11-03 RX ORDER — FUROSEMIDE 10 MG/ML
40 INJECTION INTRAMUSCULAR; INTRAVENOUS ONCE
Status: COMPLETED | OUTPATIENT
Start: 2024-11-03 | End: 2024-11-03

## 2024-11-03 RX ORDER — ALBUMIN (HUMAN) 12.5 G/50ML
25 SOLUTION INTRAVENOUS ONCE
Status: COMPLETED | OUTPATIENT
Start: 2024-11-03 | End: 2024-11-03

## 2024-11-03 RX ORDER — SODIUM CHLORIDE 9 MG/ML
INJECTION, SOLUTION INTRAVENOUS PRN
Status: DISCONTINUED | OUTPATIENT
Start: 2024-11-03 | End: 2024-11-07 | Stop reason: HOSPADM

## 2024-11-03 RX ADMIN — POTASSIUM CHLORIDE 10 MEQ: 10 INJECTION, SOLUTION INTRAVENOUS at 11:05

## 2024-11-03 RX ADMIN — LINEZOLID 600 MG: 600 INJECTION, SOLUTION INTRAVENOUS at 03:12

## 2024-11-03 RX ADMIN — LINEZOLID 600 MG: 600 INJECTION, SOLUTION INTRAVENOUS at 14:41

## 2024-11-03 RX ADMIN — HEPARIN SODIUM 22 UNITS/KG/HR: 10000 INJECTION, SOLUTION INTRAVENOUS at 19:43

## 2024-11-03 RX ADMIN — FUROSEMIDE 40 MG: 10 INJECTION, SOLUTION INTRAMUSCULAR; INTRAVENOUS at 12:58

## 2024-11-03 RX ADMIN — POTASSIUM CHLORIDE 10 MEQ: 10 INJECTION, SOLUTION INTRAVENOUS at 07:44

## 2024-11-03 RX ADMIN — POTASSIUM CHLORIDE 10 MEQ: 10 INJECTION, SOLUTION INTRAVENOUS at 20:47

## 2024-11-03 RX ADMIN — SODIUM CHLORIDE, PRESERVATIVE FREE 0.5 MG: 5 INJECTION INTRAVENOUS at 21:58

## 2024-11-03 RX ADMIN — MAGNESIUM SULFATE HEPTAHYDRATE 6000 MG: 500 INJECTION, SOLUTION INTRAMUSCULAR; INTRAVENOUS at 08:15

## 2024-11-03 RX ADMIN — HEPARIN SODIUM 22 UNITS/KG/HR: 10000 INJECTION, SOLUTION INTRAVENOUS at 08:16

## 2024-11-03 RX ADMIN — PANTOPRAZOLE SODIUM 40 MG: 40 INJECTION, POWDER, FOR SOLUTION INTRAVENOUS at 08:52

## 2024-11-03 RX ADMIN — POTASSIUM CHLORIDE 10 MEQ: 10 INJECTION, SOLUTION INTRAVENOUS at 09:56

## 2024-11-03 RX ADMIN — POTASSIUM CHLORIDE 10 MEQ: 10 INJECTION, SOLUTION INTRAVENOUS at 17:08

## 2024-11-03 RX ADMIN — SODIUM CHLORIDE, PRESERVATIVE FREE 0.5 MG: 5 INJECTION INTRAVENOUS at 04:13

## 2024-11-03 RX ADMIN — POTASSIUM CHLORIDE 10 MEQ: 10 INJECTION, SOLUTION INTRAVENOUS at 16:03

## 2024-11-03 RX ADMIN — POTASSIUM CHLORIDE 10 MEQ: 10 INJECTION, SOLUTION INTRAVENOUS at 19:42

## 2024-11-03 RX ADMIN — Medication 10 ML: at 19:42

## 2024-11-03 RX ADMIN — MICAFUNGIN SODIUM 100 MG: 100 INJECTION, POWDER, LYOPHILIZED, FOR SOLUTION INTRAVENOUS at 15:51

## 2024-11-03 RX ADMIN — Medication 10 ML: at 08:50

## 2024-11-03 RX ADMIN — ALBUMIN (HUMAN) 25 G: 0.25 INJECTION, SOLUTION INTRAVENOUS at 12:13

## 2024-11-03 RX ADMIN — LEVOFLOXACIN 750 MG: 750 INJECTION, SOLUTION INTRAVENOUS at 17:13

## 2024-11-03 RX ADMIN — POTASSIUM CHLORIDE 10 MEQ: 10 INJECTION, SOLUTION INTRAVENOUS at 18:21

## 2024-11-03 RX ADMIN — POTASSIUM CHLORIDE 10 MEQ: 10 INJECTION, SOLUTION INTRAVENOUS at 05:33

## 2024-11-03 RX ADMIN — POTASSIUM CHLORIDE 10 MEQ: 10 INJECTION, SOLUTION INTRAVENOUS at 21:58

## 2024-11-03 RX ADMIN — POTASSIUM CHLORIDE 10 MEQ: 10 INJECTION, SOLUTION INTRAVENOUS at 08:49

## 2024-11-03 RX ADMIN — POTASSIUM CHLORIDE 10 MEQ: 10 INJECTION, SOLUTION INTRAVENOUS at 06:36

## 2024-11-03 ASSESSMENT — PAIN SCALES - GENERAL
PAINLEVEL_OUTOF10: 0

## 2024-11-03 NOTE — PROGRESS NOTES
PULMONARY AND CRITICAL CARE MEDICINE PROGRESS NOTE    Subjective: Patient has been able to tolerate clear liquids.  Continues to have hypokalemia and hypomagnesemia.  Hemoglobin is stable.    REVIEW OF SYSTEMS:   Constitutional symptoms: The patient denies fever, fatigue, night sweats, weight loss or weight gain.   HEENT: No vision changes. No tinnitus, Denies sinus pain. No hoarseness, or dysphagia.   Neck: Patient denies swelling in the neck.   Cardiovascular: Denies chest pain, palpitation, syncope.  Respiratory: Denies shortness of breath or cough.   Gastrointestinal: Denies nausea, abdominal pain or change in bowel function.  Genitourinary: Denies obstructive symptoms. No history of incontinence.  Skin: No rashes or itching.   Muskuloskeletal: Denies weakness or bone pain.   Neurological: No headaches or seizures.   Psychiatric: Denies mood swings or depression.     MEDICATIONS:     Scheduled Meds:   magnesium sulfate  6,000 mg IntraVENous Once    sodium chloride flush  5-40 mL IntraVENous 2 times per day    sodium chloride flush  10 mL IntraVENous 2 times per day    [Held by provider] escitalopram  20 mg Oral Daily    [Held by provider] valsartan  80 mg Oral Daily    linezolid  600 mg IntraVENous Q12H    pantoprazole  40 mg IntraVENous Daily    sodium chloride  1,000 mL IntraVENous Once    levofloxacin  750 mg IntraVENous Q24H    micafungin  100 mg IntraVENous Q24H       Current Infusions:    sodium chloride      sodium chloride      sodium chloride Stopped (10/31/24 1407)    heparin (PORCINE) Infusion 22 Units/kg/hr (11/03/24 0816)       PRN meds:  sodium chloride flush, sodium chloride, sodium chloride, LORazepam, potassium chloride **OR** potassium alternative oral replacement **OR** potassium chloride, sodium chloride flush, sodium chloride, promethazine **OR** ondansetron, melatonin, nicotine, acetaminophen **OR** acetaminophen, [Held by provider] zolpidem, heparin (porcine), heparin (porcine),

## 2024-11-03 NOTE — PROGRESS NOTES
Hospitalist Progress Note    Name:  Jm Snow    /Age/Sex: 1995  (28 y.o. male)  MRN & CSN:  [id number] & [id number]    PCP: No primary care provider on file.    Date of Admission: 10/31/2024    Patient Status:  Inpatient     Chief Complaint: No chief complaint on file.      Hospital Course: Patient was admitted from ARU 10/31 due to fever and recently having been admitted to the ARU after a 5 week hospital stay. He was febrile and tachycardic and did not look good.  He went to   and there was a rapid response right about 7 am.  Patient was dropping oxygen sats and having increased 02 requirement.  He was transferred to 3 [place] and placed on 15 L of high flow oxygen.  At about 830 there was a second rapid response called this time on [place] due to the patient not maintaining his oxygen saturation despite the 15 L of oxygen.  The patient was dropping his sats into the mid 80s.  After his first rapid response a CT chest abdomen pelvis was ordered which revealed bilateral pleural effusions and consolidations as well as the potential of a PE.  The results came back just at the time of the second rapid response.  Based on the patient's increased oxygen requirement we placed the patient on heated high flow oxygen 60 L a minute at 100% FiO2 and transferred him to the ICU.  Additionally with the potential for a pulmonary embolism we started the patient on a heparin drip    Subjective:  Today is:  Hospital Day: 4.  Patient seen and examined in ICU-3916/3916-01.     Patient is awake and alert he does appear to have some tremor and possibly some shivers.  He is alert and able to answer questions  He was still trying to refuse a martinez but we need it to keep track of urine output.  NG tube is in place.    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 male who presented to [facility] on 10/31/2024 and is being treated for:    Sepsis  -Source appears to be multifocal pneumonia that is noted on the CT scan.  In addition to some consolidations he also has bilateral pleural effusions.  -Given his recent hospital stay that was lengthy as well as having had fungemia during that hospital stay the patient has been started on broad-spectrum antibiotics as IV antifungals.  Patient is currently on Levaquin, Zyvox and micafungin, ID is consulted  The patient does have some lower extremity edema and based off of this we have not given him sepsis protocol fluids however he is getting a fluid bolus at this time.  Getting some albumin and lasix  Salvador placed for fluid management     Acute respiratory failure/shortness of breath  -Multifactorial to include pulmonary embolism, pleural effusions and multifocal pneumonia.  -Patient has been started on IV antibiotics as noted above  -He has been started on a heparin drip  -He has been able to come off of heated high flow and is now on 2 liters NC    Multifocal pneumonia  -Antibiotics as noted above given his recent hospitalization we do need to cover for hospital-associated infections    Ileus  -Abdomen is distended and based on the CT he appears to have most likely an ileus.  -An NG tube been placed for decompression,.  - he did have some BMs yesterday,  -Patient did have an ileus during his previous hospital stay at .  -Patient has been having some diarrhea and the C. difficile workup was negative  - continue ng tube for now.   - he is a bit better here and is asking if he can eat.    -He has been having bowel movements.    NG tube for decompression.    speech evaluation and possibly clear liquid diet today if

## 2024-11-03 NOTE — PROGRESS NOTES
Speech Language Pathology  [facility]  [phone]  SLP Dysphagia Treatment       Patient: Jm nSow   : 1995   MRN: [id number]      Evaluation Date: 11/3/2024      Admitting Dx: Sepsis (HCC) [A41.9]  Treatment Diagnosis: Oropharyngeal Dysphagia   Pain: Did not state                                  Recommendations      Recommended Diet and Intervention 11/3/2024:  Diet Solids Recommendation:  Clear liquids  Liquid Consistency Recommendation:  Thin liquids, No straws  Recommended form of Meds: Meds in puree          Compensatory strategies: Upright as possible with all PO intake , No straws , Small bites/sips , Eat/feed slowly, Aspiration Precautions     Discharge Recommendations:  Recommend ongoing SLP for dysphagia therapy upon discharge from hospital with further evaluation of speech/cognitive function.     History/Course of Treatment     H&P:   Patient was admitted from ARU 10/31 due to fever and recently having been admitted to the ARU after a 5 week hospital stay. He was febrile and tachycardic and did not look good.  He went to   and there was a rapid response right about 7 am.  Patient was dropping oxygen sats and having increased 02 requirement.  He was transferred to [address] [place] [address] and placed on 15 L of high flow oxygen.  At about 830 there was a second rapid response called this time on [address] [place] [address] due to the patient not maintaining his oxygen saturation despite the 15 L of oxygen.  The patient was dropping his sats into the mid 80s.  After his first rapid response a CT chest abdomen pelvis was ordered which revealed bilateral pleural effusions and consolidations as well as the potential of a PE.  The results came back just at the time of the second rapid response.  Based on the patient's increased oxygen requirement we placed the patient on heated high flow oxygen 60 L a minute at 100% FiO2 and transferred him to the ICU.  Additionally with the  alert, responsive, and oriented, with reduced intelligibility of speech due to reduced breath support and strength and delayed responses. Positioned Upright in bed . On room air with RR <20/min. Oral motor exam revealed mildly reduced lingual, labial, and buccal ROM and coordination. Dentition was adequate for mastication with mild residuals from previous clear liquid (Jell-O). Laryngeal function assessment revealed present volitional swallow, weak volitional cough, and clear vocal quality. Pt assessed with PO presentations, fed with support from SLP: ice chips, thin liquids (spoon, cup) and puree. Oral phase characterized by prolonged oral manipulation and limited oral residue post swallow. Concern for pharyngeal phase impairments including delayed and reduced laryngeal elevation. No overt s/s of aspiration were assessed with the limited consistencies.     Pt with clinical s/s oropharyngeal dysphagia at bedside. Dysphagia risk factors include hx of dysphagia and co-morbidities. Risk factors for developing adverse outcomes to aspiration include reduced ambulation and medical status. Therefore, aspiration precautions should be in place. SLP provided education re: safe swallowing strategies and rationale for speech therapy evaluation/intervention. Recommend to continue with clear liquids per MD orders and ongoing intervention for diet tolerance, will continue to assess tolerance of upgraded consistencies per recommendations to advance per MD. He may benefit from further assessment of speech and cognitive function as he progresses.     Dysphagia Therapeutic Intervention:  Diet Tolerance Monitoring , Patient/Family Education , Therapeutic Trials with SLP     Patient Positioning: Upright in bed     Diet level prior to evaluation: Clear liquids        Respiratory Status:   Previously intubated- 10/12/2024 to10/18/2024 at [facility]   Room air     Dentition:  Adequate dentition     Baseline Vocal Quality:  Weak     Volitional  Cough:  Elicited: Weak     Volitional Swallow:   []Absent   []Delayed     []Adequate     [x]Required use of drink     Oral Mechanism Exam:  Mild   Impaired:   Labial Strength  Labial Coordination     Oral Phase: Mild   Decreased anterior to posterior transit   Assessment limited to clear liquid     Pharyngeal Phase: Mild  and Moderate   Delayed swallow initiation   Decreased laryngeal elevation    Dysphagia risk factors:   hx of dysphagia and co-morbidities         Goals     Goals:  Dysphagia Goals: Pt will functionally tolerate recommended diet with no overt clinical s/s of aspiration   Pt will functionally tolerate ongoing assessment of swallow function with diet to be determined as indicated   Pt will advance to least restrictive diet as indicated         POC/Education     Dysphagia Therapeutic Intervention:  Diet Tolerance Monitoring , Patient/Family Education , Therapeutic Trials with SLP     Plan of care: 3-5 times per week during acute care stay.      Education:  Provided education regarding role of SLP, results of assessment, recommendations and general speech pathology plan of care:  Pt verbalized understanding and agreement   Pt requires ongoing learning   Reviewed results and recommendations with RN and pt's father present at the end of the evaluation.     If patient discharges prior to next visit, this note will serve as discharge.     Treatment time:  Timed Code Treatment Minutes: 0  Total Treatment Time Minutes: 25    Electronically signed by:    Leatha Wade M.A. [facility]-SL[facility] [place] [id number]  Speech-Language Pathologist   11/3/2024 8:33 AM

## 2024-11-04 LAB
ANION GAP SERPL CALCULATED.3IONS-SCNC: 10 MMOL/L (ref 3–16)
ANION GAP SERPL CALCULATED.3IONS-SCNC: 8 MMOL/L (ref 3–16)
ANTI-XA UNFRAC HEPARIN: 0.39 IU/ML (ref 0.3–0.7)
BACTERIA BLD CULT ORG #2: NORMAL
BACTERIA BLD CULT ORG #2: NORMAL
BACTERIA BLD CULT: NORMAL
BACTERIA BLD CULT: NORMAL
BASOPHILS # BLD: 0 K/UL (ref 0–0.2)
BASOPHILS NFR BLD: 0 %
BUN SERPL-MCNC: 10 MG/DL (ref 7–20)
BUN SERPL-MCNC: 7 MG/DL (ref 7–20)
CALCIUM SERPL-MCNC: 7.1 MG/DL (ref 8.3–10.6)
CALCIUM SERPL-MCNC: 7.2 MG/DL (ref 8.3–10.6)
CHLORIDE SERPL-SCNC: 107 MMOL/L (ref 99–110)
CHLORIDE SERPL-SCNC: 108 MMOL/L (ref 99–110)
CO2 SERPL-SCNC: 19 MMOL/L (ref 21–32)
CO2 SERPL-SCNC: 20 MMOL/L (ref 21–32)
CREAT SERPL-MCNC: 0.6 MG/DL (ref 0.9–1.3)
CREAT SERPL-MCNC: 0.7 MG/DL (ref 0.9–1.3)
DEPRECATED RDW RBC AUTO: 15.3 % (ref 12.4–15.4)
EOSINOPHIL # BLD: 0.3 K/UL (ref 0–0.6)
EOSINOPHIL NFR BLD: 12 %
GFR SERPLBLD CREATININE-BSD FMLA CKD-EPI: >90 ML/MIN/{1.73_M2}
GFR SERPLBLD CREATININE-BSD FMLA CKD-EPI: >90 ML/MIN/{1.73_M2}
GLUCOSE SERPL-MCNC: 83 MG/DL (ref 70–99)
GLUCOSE SERPL-MCNC: 84 MG/DL (ref 70–99)
HCT VFR BLD AUTO: 26 % (ref 40.5–52.5)
HGB BLD-MCNC: 8.3 G/DL (ref 13.5–17.5)
LYMPHOCYTES # BLD: 0.6 K/UL (ref 1–5.1)
LYMPHOCYTES NFR BLD: 23 %
MAGNESIUM SERPL-MCNC: 1.59 MG/DL (ref 1.8–2.4)
MAGNESIUM SERPL-MCNC: 1.88 MG/DL (ref 1.8–2.4)
MCH RBC QN AUTO: 29.1 PG (ref 26–34)
MCHC RBC AUTO-ENTMCNC: 31.9 G/DL (ref 31–36)
MCV RBC AUTO: 91.4 FL (ref 80–100)
METAMYELOCYTES NFR BLD MANUAL: 1 %
MONOCYTES # BLD: 0.1 K/UL (ref 0–1.3)
MONOCYTES NFR BLD: 6 %
NEUTROPHILS # BLD: 1.4 K/UL (ref 1.7–7.7)
NEUTROPHILS NFR BLD: 55 %
NEUTS BAND NFR BLD MANUAL: 3 % (ref 0–7)
PATH INTERP BLD-IMP: NORMAL
PLATELET # BLD AUTO: 195 K/UL (ref 135–450)
PMV BLD AUTO: 9.3 FL (ref 5–10.5)
POTASSIUM SERPL-SCNC: 2.8 MMOL/L (ref 3.5–5.1)
POTASSIUM SERPL-SCNC: 3.4 MMOL/L (ref 3.5–5.1)
PROCALCITONIN SERPL IA-MCNC: 0.59 NG/ML (ref 0–0.15)
RBC # BLD AUTO: 2.85 M/UL (ref 4.2–5.9)
SODIUM SERPL-SCNC: 136 MMOL/L (ref 136–145)
SODIUM SERPL-SCNC: 136 MMOL/L (ref 136–145)
WBC # BLD AUTO: 2.4 K/UL (ref 4–11)

## 2024-11-04 PROCEDURE — 6360000002 HC RX W HCPCS: Performed by: INTERNAL MEDICINE

## 2024-11-04 PROCEDURE — 84630 ASSAY OF ZINC: CPT

## 2024-11-04 PROCEDURE — 97110 THERAPEUTIC EXERCISES: CPT

## 2024-11-04 PROCEDURE — 97530 THERAPEUTIC ACTIVITIES: CPT

## 2024-11-04 PROCEDURE — 94761 N-INVAS EAR/PLS OXIMETRY MLT: CPT

## 2024-11-04 PROCEDURE — 6360000002 HC RX W HCPCS: Performed by: STUDENT IN AN ORGANIZED HEALTH CARE EDUCATION/TRAINING PROGRAM

## 2024-11-04 PROCEDURE — 6370000000 HC RX 637 (ALT 250 FOR IP): Performed by: INTERNAL MEDICINE

## 2024-11-04 PROCEDURE — 36415 COLL VENOUS BLD VENIPUNCTURE: CPT

## 2024-11-04 PROCEDURE — 92526 ORAL FUNCTION THERAPY: CPT

## 2024-11-04 PROCEDURE — G0378 HOSPITAL OBSERVATION PER HR: HCPCS

## 2024-11-04 PROCEDURE — 85025 COMPLETE CBC W/AUTO DIFF WBC: CPT

## 2024-11-04 PROCEDURE — 94669 MECHANICAL CHEST WALL OSCILL: CPT

## 2024-11-04 PROCEDURE — 99233 SBSQ HOSP IP/OBS HIGH 50: CPT | Performed by: INTERNAL MEDICINE

## 2024-11-04 PROCEDURE — 84145 PROCALCITONIN (PCT): CPT

## 2024-11-04 PROCEDURE — 2580000003 HC RX 258: Performed by: INTERNAL MEDICINE

## 2024-11-04 PROCEDURE — 97535 SELF CARE MNGMENT TRAINING: CPT

## 2024-11-04 PROCEDURE — 96376 TX/PRO/DX INJ SAME DRUG ADON: CPT

## 2024-11-04 PROCEDURE — 85520 HEPARIN ASSAY: CPT

## 2024-11-04 PROCEDURE — 96366 THER/PROPH/DIAG IV INF ADDON: CPT

## 2024-11-04 PROCEDURE — 80048 BASIC METABOLIC PNL TOTAL CA: CPT

## 2024-11-04 PROCEDURE — 2060000000 HC ICU INTERMEDIATE R&B

## 2024-11-04 PROCEDURE — 6360000002 HC RX W HCPCS: Performed by: NURSE PRACTITIONER

## 2024-11-04 PROCEDURE — 83735 ASSAY OF MAGNESIUM: CPT

## 2024-11-04 RX ORDER — UBIDECARENONE 75 MG
50 CAPSULE ORAL DAILY
Status: DISCONTINUED | OUTPATIENT
Start: 2024-11-04 | End: 2024-11-07 | Stop reason: HOSPADM

## 2024-11-04 RX ORDER — ZINC SULFATE 50(220)MG
50 CAPSULE ORAL DAILY
Status: DISCONTINUED | OUTPATIENT
Start: 2024-11-04 | End: 2024-11-07 | Stop reason: HOSPADM

## 2024-11-04 RX ORDER — GAUZE BANDAGE 2" X 2"
100 BANDAGE TOPICAL DAILY
Status: DISCONTINUED | OUTPATIENT
Start: 2024-11-04 | End: 2024-11-05

## 2024-11-04 RX ORDER — MAGNESIUM SULFATE IN WATER 40 MG/ML
4000 INJECTION, SOLUTION INTRAVENOUS ONCE
Status: COMPLETED | OUTPATIENT
Start: 2024-11-04 | End: 2024-11-04

## 2024-11-04 RX ADMIN — POTASSIUM CHLORIDE 10 MEQ: 10 INJECTION, SOLUTION INTRAVENOUS at 01:49

## 2024-11-04 RX ADMIN — MAGNESIUM SULFATE HEPTAHYDRATE 4000 MG: 40 INJECTION, SOLUTION INTRAVENOUS at 12:38

## 2024-11-04 RX ADMIN — POTASSIUM CHLORIDE 10 MEQ: 10 INJECTION, SOLUTION INTRAVENOUS at 03:00

## 2024-11-04 RX ADMIN — LEVOFLOXACIN 750 MG: 750 INJECTION, SOLUTION INTRAVENOUS at 17:40

## 2024-11-04 RX ADMIN — Medication 10 ML: at 14:59

## 2024-11-04 RX ADMIN — VITAM B12 50 MCG: 100 TAB at 14:59

## 2024-11-04 RX ADMIN — Medication 10 ML: at 19:45

## 2024-11-04 RX ADMIN — HEPARIN SODIUM 22 UNITS/KG/HR: 10000 INJECTION, SOLUTION INTRAVENOUS at 17:30

## 2024-11-04 RX ADMIN — LINEZOLID 600 MG: 600 INJECTION, SOLUTION INTRAVENOUS at 02:56

## 2024-11-04 RX ADMIN — Medication 100 MG: at 12:39

## 2024-11-04 RX ADMIN — PANTOPRAZOLE SODIUM 40 MG: 40 INJECTION, POWDER, FOR SOLUTION INTRAVENOUS at 14:59

## 2024-11-04 RX ADMIN — POTASSIUM CHLORIDE 10 MEQ: 10 INJECTION, SOLUTION INTRAVENOUS at 06:16

## 2024-11-04 RX ADMIN — POTASSIUM CHLORIDE 10 MEQ: 10 INJECTION, SOLUTION INTRAVENOUS at 04:01

## 2024-11-04 RX ADMIN — POTASSIUM CHLORIDE 10 MEQ: 10 INJECTION, SOLUTION INTRAVENOUS at 05:10

## 2024-11-04 RX ADMIN — Medication 50 MG: at 12:39

## 2024-11-04 RX ADMIN — HEPARIN SODIUM 22 UNITS/KG/HR: 10000 INJECTION, SOLUTION INTRAVENOUS at 06:53

## 2024-11-04 RX ADMIN — POTASSIUM CHLORIDE 10 MEQ: 10 INJECTION, SOLUTION INTRAVENOUS at 00:41

## 2024-11-04 ASSESSMENT — ENCOUNTER SYMPTOMS
COUGH: 0
SORE THROAT: 0
WHEEZING: 0
ABDOMINAL PAIN: 0
CONSTIPATION: 0
NAUSEA: 0
BACK PAIN: 0
SHORTNESS OF BREATH: 0
DIARRHEA: 0
EYE REDNESS: 0
SINUS PRESSURE: 0
EYE DISCHARGE: 0
SINUS PAIN: 0
RHINORRHEA: 0

## 2024-11-04 NOTE — PROGRESS NOTES
[facility] - Inpatient Rehabilitation Department   Phone: [phone]    Physical Therapy    [] Initial Evaluation            [x] Daily Treatment Note         [] Discharge Summary      Patient: Jm Snow   : 1995   MRN: [id number]   Date of Service:  2024  Admitting Diagnosis: Sepsis (HCC)    Current Admission Summary: Debility due to septic shock     History of Present Illness/Hospital Course:  Jm Snow is a 28 y.o. male with PMHx notable for EtOH use disorder, anxiety and HTN who presented to [facility] on  with sepsis on , found to have fungenemia of unclear source, possibly secondary to bilateral hip AVN with septic arthritis (however, left hip aspirate cultures with no growth).  He completed 7 days of empiric antibiotics.  Hospital course was complicated by severe TONYA acute renal failure due to ATN, requiring hemodialysis with now recovered renal function.  He additionally developed acute hypoxic respiratory failure secondary to ARDS and aspiration pneumonia requiring intubation on 10/12.  He was transferred to [facility] MICU on 10/12 for further management.  Extubated on 10/18. Hospital course was further complicated by encephalopathy with agitation and psychosis (episodes of hallucination), suspected multifactorial due to metabolic derangements, active infection and possible alcohol withdrawal, as well as ileus.    Hospital Course: Patient was admitted from ARU 10/31 due to fever and recently having been admitted to the ARU after a 5 week hospital stay. He was febrile and tachycardic and did not look good. He went to   and there was a rapid response right about 7 am.  Patient was dropping oxygen sats and having increased 02 requirement.  He was transferred to 3 tower and placed on 15 L of high flow oxygen. At about 830 there was a second rapid response called this time on 3 tower due to the patient not maintaining his oxygen saturation despite the 15 L of oxygen.  The  patient was dropping his sats into the mid 80s. After his first rapid response a CT chest abdomen pelvis was ordered which revealed bilateral pleural effusions and consolidations as well as the potential of a PE.  The results came back just at the time of the second rapid response.  Based on the patient's increased oxygen requirement we placed the patient on heated high flow oxygen 60 L a minute at 100% FiO2 and transferred him to the ICU.  Additionally with the potential for a pulmonary embolism we started the patient on a heparin drip    Past Medical History:  has a past medical history of Acute anxiety, Acute HFrEF (heart failure with reduced ejection fraction) ([facility]), TONYA (acute kidney injury) ([facility]), Anasarca, ARDS (adult respiratory distress syndrome), ATN (acute tubular necrosis) ([facility]), Constipation, ETOH abuse, Fungemia, Ileus (HCC), Prolonged Q-T interval on ECG, and Septic shock ([facility]).  Past Surgical History:  has no past surgical history on file.    Discharge Recommendations: Jm Snow scored a 6/24 on the AM-PAC short mobility form. Current research shows that an AM-PAC score of 17 or less is typically not associated with a discharge to the patient's home setting. Based on the patient's AM-PAC score and their current functional mobility deficits, it is recommended that the patient have 5-7 sessions per week of Physical Therapy at d/c to increase the patient's independence.  At this time, this patient demonstrates complex nursing, medical, and rehabilitative needs, and would benefit from intensive rehabilitation services upon discharge from the Inpatient setting.  This patient demonstrates the ability to participate in and benefit from an intensive therapy program with a coordinated interdisciplinary team approach to foster frequent, structured, and documented communication among disciplines, who will work together to establish, prioritize, and achieve treatment goals. Please see assessment section  transfer: 2 person assistance with MAX x2   Comments:  Ambulation  Ambulation not tested on this date secondary to weakness.  Distance: N/A  Gait Mechanics: N/A  Comments:    Stair Mobility  Stair mobility not completed on this date.  Comments:  Wheelchair Mobility:  No w/c mobility completed on this date.  Comments:   Balance:  Static Sitting Balance: poor (-): requires max (A) to maintain balance  Static Standing Balance: poor (-): requires max (A) to maintain balance  Comments: Pt has heavy posterior trunk lean in sitting due to rigidity of trunk. Pt able to lean forward and reach for objects in sitting but only able to maintain position briefly with decreased eccentric control to return to reclined position.      Other Therapeutic Interventions  Patient educated on and provided a HEP for BLE exercises both in sitting and supine.      Functional Outcomes  AM-PAC Inpatient Mobility Raw Score : 6              Cognition  Overall Cognitive Status: WFL  Following Commands: follows multi step commands with repetition, follows multi step commands with increased time  Safety Judgement: decreased awareness of need for assistance  Problem Solving: assistance required to generate solutions, assistance required to implement solutions  Insights: decreased awareness of deficits  Initiation: requires cues for all  Sequencing: requires cues for all  Comments: Pt has decrease awareness of deficits with increased time spent explaining proper steps prior to walking, standing, wheelchair mobility. Pt able to verbalize understanding. Pt anxious with movement and require increase time to explain process and benefits   Orientation:    alert and oriented x 4  Command Following:   WFL    Education  Barriers To Learning: physical  Patient Education: patient educated on goals, PT role and benefits, plan of care, general safety, functional mobility training, proper use of assistive device/equipment, family education, pressure relief, injury

## 2024-11-04 NOTE — PROGRESS NOTES
Speech Language Pathology  [facility] - Inpatient Rehabilitation Services  [phone]  SLP Dysphagia Treatment       Patient: Jm Snow   : 1995   MRN: [id number]      Evaluation Date: 2024      Admitting Dx: Sepsis (HCC) [A41.9]  Treatment Diagnosis: Oropharyngeal Dysphagia   Pain: Did not state                                  Recommendations      Recommended Diet and Intervention 2024:  Diet Solids Recommendation:  Dysphagia III Soft and bite sized  Liquid Consistency Recommendation:  Thin liquids  Recommended form of Meds: Meds in puree          Compensatory strategies: Upright as possible with all PO intake , No straws , Small bites/sips , Eat/feed slowly, Aspiration Precautions     Discharge Recommendations:  Recommend ongoing SLP for dysphagia therapy upon discharge from hospital with further evaluation of speech/cognitive function.     History/Course of Treatment     H&P:   Patient was admitted from ARU 10/31 due to fever and recently having been admitted to the ARU after a 5 week hospital stay. He was febrile and tachycardic and did not look good.  He went to   and there was a rapid response right about 7 am.  Patient was dropping oxygen sats and having increased 02 requirement.  He was transferred to [address] [place] [address] and placed on 15 L of high flow oxygen.  At about 830 there was a second rapid response called this time on [address] [place] [address] due to the patient not maintaining his oxygen saturation despite the 15 L of oxygen.  The patient was dropping his sats into the mid 80s.  After his first rapid response a CT chest abdomen pelvis was ordered which revealed bilateral pleural effusions and consolidations as well as the potential of a PE.  The results came back just at the time of the second rapid response.  Based on the patient's increased oxygen requirement we placed the patient on heated high flow oxygen 60 L a minute at 100% FiO2 and transferred him to the ICU.  Additionally with the  evaluation orders received with request to assess pt's ability to accept solid PO. Currently on SLP caseload.     Dysphagia Treatment:     Assessment of Texture Tolerance:  Diet level prior to treatment: Clear liquids , Thin liquids   Tolerance of Current Diet Level:Per chart, no noted difficulty with current diet level      -Impressions: Pt was positioned Upright in bed , awake and alert. Currently on room air. NG tube currently in place. Trials of thin liquids, puree , soft and bite sized solids , and regular solids  were provided to assess swallow function. Pt demonstrating difficulty with self feeding due to tremors. Pt demonstrated prolonged mastication with effective oral clearing of soft solids. Diffuse lingual stasis was noted with regular solids. Pharyngeal pooling was suspected with suspected delayed swallow initiation. Use of double swallows was noted with various textures, this may due to delayed pharyngeal clearing. Of note, NG tube can impact pharyngeal clearing. Pt demonstrated difficulty with thin liquids via cup due to hand tremors, one instance of delayed cough was noted. Thin liquids via straw, small single sips, revealed no immediate overt clinical s/s of aspiration/penetration.  Pt demonstrates increased risk for aspiration due to co morbidities , deconditioning , and prior hx of dysphagia . At this time due to prolonged mastication and s/s of delayed pharyngeal clearing recommend Dysphagia III Soft and bite sized  with Thin liquids , Meds in puree  with use of compensatory swallow strategies (see above).     Eating Assistance:   Supervision or touching assistance    Assessment: Pt progressing toward goals           Goals     Goals:  Dysphagia Goals: Pt will functionally tolerate recommended diet with no overt clinical s/s of aspiration   Pt will functionally tolerate ongoing assessment of swallow function with diet to be determined as indicated   Pt will advance to least restrictive diet as

## 2024-11-04 NOTE — PROGRESS NOTES
--    ALKPHOS 78  --  91  --   --   --    ALT 17  --  17  --   --   --    AST 21  --  32  --   --   --    GLUCOSE 83  --  83  --  83 84    < > = values in this interval not displayed.       IMPRESSION:  Acute hypoxic respiratory failure  Sepsis  Ileus  Bilateral lower lobe pneumonia  Alcohol abuse  Hypokalemia  Hypomagnesemia        PLAN:  Respiratory status continues to improve.  Now on room air.  Initial CAT scan of the chest that showed bilateral lower lobe infiltrates thought to be more of atelectasis associate with small bilateral pleural effusions.  Pneumonia was considered less likely.  Recent hospitalization at  for 5 to 6 weeks for ARDS, pneumonia, ATN, alcohol withdrawal and ileus requiring mechanical ventilation.  Hypoxia has improved.  On room air now.  Continue aggressive pulmonary toilet with incentive spirometry and Acapella every 4 hours.    CT chest also showed probable small left upper lobe PE.  Patient is on heparin drip.  No DVT noted on lower extremity ultrasound  Once able to except oral diet, may have to transition him to DOAC for a total of 3 to 6 months.     Hemodynamically stable and off of all vasopressors  Patient was noted to have a EF of 45 to 50% on echo from 10/21.  Significant edema of lower extremities.  No pulmonary edema noted.    Echo with preserved EF.  Trivial pericardial effusion.     Abdominal distention likely due to ileus noted on imaging.  X-ray KUB from today continues to show similar findings.  However, patient has been having bowel movements.  Patient has been tolerating clear liquids.  Clamp NG tube.    Get speech evaluation and start the patient on oral diet if recommended.  Patient also has diarrhea.  C. difficile is negative.     TONYA, resolved.  Replace electrolytes as needed.     Zyvox, Levaquin and micafungin for broad-spectrum coverage per ID.  MRSA nares negative.  Blood cultures negative.  Legionella and streptococcal urine antigen negative.  C. difficile  negative.     Elevated TSH.  Normal free T4 levels     History of alcohol abuse.  UDS was also positive for cocaine at .     Anemia required 1 unit PRBC transfusion.  No evidence of bleeding.  Hemoglobin stable at 7.9.  Leukopenia with white cell count 2.3  Patient has anemia and leukopenia likely secondary alcohol abuse     Protonix for stress ulcer prophylaxis.     Out of bed in chair.  PT OT.    Patient can be moved out of medical ICU today.      Nothing further to add from critical care standpoint.  We will sign off.      Erickson Le MD, [facility] Pulmonary, Critical Care and Sleep Medicine  [address], [address], [place] [id number]  10/31/2024, 2:53 PM        This note was completed using dragon medical speech recognition software. Grammatical errors, random word insertions, pronoun errors and incomplete sentences are occasional consequences of this technology due to software limitations. If there are questions or concerns about the content of this note of information contained within the body of this dictation they should be addressed with the provider for clarification.

## 2024-11-04 NOTE — PROGRESS NOTES
Infectious Diseases   Progress Note      Admission Date: 10/31/2024  Hospital Day: Hospital Day: 5   Attending: Misael Alonso MD  Date of service: 11/4/2024     Chief complaint/ Reason for consult:     Acute respiratory failure with hypoxia, currently requiring high flow oxygen  Multifocal pneumonia  Diffuse maculopapular rash, likely drug rash  Septic shock with high-grade fever 102.7, tachycardia, tachypnea, hypotension requiring vasopressors  Recent history of fungemia  Prolonged hospitalization at     Microbiology:      I have reviewed allavailable micro lab data and cultures    Results       Procedure Component Value Units Date/Time    Culture, MRSA, Screening [[id number]] Collected: 10/31/24 1845    Order Status: Completed Specimen: Nares Updated: 11/01/24 1956     MRSA Culture Only --     No S aureus MRSA isolated  S aureus MSSA present      Narrative:      ORDER#: [id number]                          ORDERED BY: MAGDALENA MANZO  SOURCE: Nares                              COLLECTED:  10/31/24 18:45  ANTIBIOTICS AT [facility].:                      RECEIVED :  11/01/24 01:39    Respiratory Panel, Molecular, with COVID-19 (Restricted: peds pts or suitable admitted adults) [[id number]] Collected: 10/31/24 1845    Order Status: Completed Specimen: Nasopharyngeal Updated: 11/01/24 0308     Respiratory Panel PCR --     Respiratory Pathogens Panel PCR Result: Not Detected  See additional report for complete Respiratory Pathogens Panel      Narrative:      ORDER#: [id number]                          ORDERED BY: REECE ROBERTO  SOURCE: Nasopharyngeal                     COLLECTED:  10/31/24 18:45  ANTIBIOTICS AT [facility].:                      RECEIVED :  11/01/24 01:51    Respiratory Panel Film Array Report [[id number]] Collected: 10/31/24 1845    Order Status: Completed Updated: 11/01/24 0309     Report SEE IMAGE    Culture, Blood 1 [[id number]] Collected: 10/31/24 0949    Order Status: Completed Specimen: Blood  83  --  83 84        Hepatic Function Panel:   Lab Results   Component Value Date/Time    ALKPHOS 91 11/03/2024 04:42 AM    ALT 17 11/03/2024 04:42 AM    AST 32 11/03/2024 04:42 AM    BILITOT 0.4 11/03/2024 04:42 AM       CPK: No results found for: \"CKTOTAL\"  ESR:   Lab Results   Component Value Date    SEDRATE 20 (H) 10/31/2024     CRP:   Lab Results   Component Value Date    CRP 72.7 (H) 10/31/2024           Imaging:    All pertinent images and reports for the current visit were reviewed by me during this visit.  I reviewed the chest x-ray/CT scan/MRI images today and independently interpreted the findings and results today.    XR ABDOMEN (KUB) (SINGLE AP VIEW)   Final Result   No significant interval change.         Vascular duplex lower extremity venous bilateral   Final Result      XR ABDOMEN FOR NG/OG/NE TUBE PLACEMENT   Final Result   NG tube is seen in the upper abdomen, tip projecting superiorly, in the   region of the gastric fundus.      Hazy opacity at the lung bases, right greater than left, either atelectasis   or pneumonia.      RECOMMENDATION:         CT ABDOMEN PELVIS W IV CONTRAST Additional Contrast? None   Final Result   Chest:      Poor opacification anterior branch left upper lobe pulmonary artery, not   clearly artifactual, suggestive of small left upper lobe pulmonary embolus.      Patchy nodularity right upper lobe and right middle lobe suspicious for   pneumonia      Bilateral pleural effusions with adjacent consolidation of the lung bases.   Basilar lung consolidation is either due to atelectasis or pneumonia      Sclerosis of the humeral heads bilaterally suggesting avascular necrosis.      Abdomen and pelvis      Scattered fluid-filled loops of small and large bowel are seen.  No definite   transition point to suggest obstruction.  There is mild abdominal and pelvic   ascites seen, which may relate to fluid overload given the body wall anasarca.      There is suspected avascular necrosis  of the femoral heads bilaterally and a   subchondral fracture on the left.      RECOMMENDATIONS:   Results discussed with Dr Steen by Sanjay Qiu MD at 8:03 am on   10/31/2024         CT CHEST PULMONARY EMBOLISM W CONTRAST   Final Result   Chest:      Poor opacification anterior branch left upper lobe pulmonary artery, not   clearly artifactual, suggestive of small left upper lobe pulmonary embolus.      Patchy nodularity right upper lobe and right middle lobe suspicious for   pneumonia      Bilateral pleural effusions with adjacent consolidation of the lung bases.   Basilar lung consolidation is either due to atelectasis or pneumonia      Sclerosis of the humeral heads bilaterally suggesting avascular necrosis.      Abdomen and pelvis      Scattered fluid-filled loops of small and large bowel are seen.  No definite   transition point to suggest obstruction.  There is mild abdominal and pelvic   ascites seen, which may relate to fluid overload given the body wall anasarca.      There is suspected avascular necrosis of the femoral heads bilaterally and a   subchondral fracture on the left.      RECOMMENDATIONS:   Results discussed with Dr Steen by Sanjay Qiu MD at 8:03 am on   10/31/2024             Medications: All current and past medications were reviewed.     magnesium sulfate  4,000 mg IntraVENous Once    zinc sulfate  50 mg Oral Daily    vitamin B-12  50 mcg Oral Daily    thiamine mononitrate  100 mg Oral Daily    sodium chloride flush  5-40 mL IntraVENous 2 times per day    sodium chloride flush  10 mL IntraVENous 2 times per day    [Held by provider] escitalopram  20 mg Oral Daily    [Held by provider] valsartan  80 mg Oral Daily    linezolid  600 mg IntraVENous Q12H    pantoprazole  40 mg IntraVENous Daily    sodium chloride  1,000 mL IntraVENous Once    levofloxacin  750 mg IntraVENous Q24H    micafungin  100 mg IntraVENous Q24H        sodium chloride      sodium chloride      sodium chloride

## 2024-11-04 NOTE — PROGRESS NOTES
[facility] - Inpatient Rehabilitation Department   Phone: [phone]    Occupational Therapy    [] Initial Evaluation            [x] Daily Treatment Note         [] Discharge Summary      Patient: Jm Snow   : 1995   MRN: [id number]   Date of Service:  2024    Admitting Diagnosis:  Sepsis (HCC)  Current Admission Summary: 28 y.o. male with PMHx notable for EtOH use disorder, anxiety and HTN who presented to [facility] [place] [place] on  with sepsis on , found to have fungenemia of unclear source, possibly secondary to bilateral hip AVN with septic arthritis (however, left hip aspirate cultures with no growth).  He completed 7 days of empiric antibiotics.  Hospital course was complicated by severe TONYA acute renal failure due to ATN, requiring hemodialysis with now recovered renal function.  He additionally developed acute hypoxic respiratory failure secondary to ARDS and aspiration pneumonia requiring intubation on 10/12.  He was transferred to [facility] MICU on 10/12 for further management.  Extubated on 10/18. Hospital course was further complicated by encephalopathy with agitation and psychosis (episodes of hallucination), suspected multifactorial due to metabolic derangements, active infection and possible alcohol withdrawal, as well as ileus. Admitted to ARU but required discharge to ICU after multiple rapid responses, tachycardia, with concern for PE     Past Medical History:  has a past medical history of Acute anxiety, Acute HFrEF (heart failure with reduced ejection fraction) ([facility]), TONYA (acute kidney injury) ([facility]), Anasarca, ARDS (adult respiratory distress syndrome), ATN (acute tubular necrosis) ([facility]), Constipation, ETOH abuse, Fungemia, Ileus ([facility]), Prolonged Q-T interval on ECG, and Septic shock ([facility]).  Past Surgical History:  has no past surgical history on file.    Discharge Recommendations: Jm Snow scored a 9/24 on the AM-PAC ADL Inpatient form. Current research shows that an

## 2024-11-04 NOTE — PROGRESS NOTES
Hospitalist Progress Note    Name:  Jm Snow    /Age/Sex: 1995  (28 y.o. male)  MRN & CSN:  [id number] & [id number]    PCP: No primary care provider on file.    Date of Admission: 10/31/2024    Patient Status:  Inpatient     Chief Complaint: No chief complaint on file.      Hospital Course: Patient was admitted from ARU 10/31 due to fever and recently having been admitted to the ARU after a 5 week hospital stay. He was febrile and tachycardic and did not look good.  He went to   and there was a rapid response right about 7 am.  Patient was dropping oxygen sats and having increased 02 requirement.  He was transferred to [address] [place] and placed on 15 L of high flow oxygen.  At about 830 there was a second rapid response called this time on [address] [place] due to the patient not maintaining his oxygen saturation despite the 15 L of oxygen.  The patient was dropping his sats into the mid 80s.  After his first rapid response a CT chest abdomen pelvis was ordered which revealed bilateral pleural effusions and consolidations as well as the potential of a PE.  The results came back just at the time of the second rapid response.  Based on the patient's increased oxygen requirement we placed the patient on heated high flow oxygen 60 L a minute at 100% FiO2 and transferred him to the ICU.  Additionally with the potential for a pulmonary embolism we started the patient on a heparin drip    Subjective:  Today is:  Hospital Day: 5.  Patient seen and examined in [id number].     Patient is awake and alert he does appear to have some tremor and possibly some shivers.  He is alert and able to answer questions  Madeleine and RAIZA in place  He is alert   Diet advanced today,.    Infusion Medications    sodium chloride      sodium chloride      sodium chloride Stopped (10/31/24 1407)    heparin (PORCINE) Infusion 22 Units/kg/hr (24 1730)     Scheduled Medications    zinc sulfate  50 mg Oral Daily    vitamin B-12  50 mcg  bowel are seen.  No definite   transition point to suggest obstruction.  There is mild abdominal and pelvic   ascites seen, which may relate to fluid overload given the body wall anasarca.      There is suspected avascular necrosis of the femoral heads bilaterally and a   subchondral fracture on the left.      RECOMMENDATIONS:   Results discussed with Dr Steen by Sanjay Qiu MD at 8:03 am on   10/31/2024         CT CHEST PULMONARY EMBOLISM W CONTRAST   Final Result   Chest:      Poor opacification anterior branch left upper lobe pulmonary artery, not   clearly artifactual, suggestive of small left upper lobe pulmonary embolus.      Patchy nodularity right upper lobe and right middle lobe suspicious for   pneumonia      Bilateral pleural effusions with adjacent consolidation of the lung bases.   Basilar lung consolidation is either due to atelectasis or pneumonia      Sclerosis of the humeral heads bilaterally suggesting avascular necrosis.      Abdomen and pelvis      Scattered fluid-filled loops of small and large bowel are seen.  No definite   transition point to suggest obstruction.  There is mild abdominal and pelvic   ascites seen, which may relate to fluid overload given the body wall anasarca.      There is suspected avascular necrosis of the femoral heads bilaterally and a   subchondral fracture on the left.      RECOMMENDATIONS:   Results discussed with Dr Steen by Sanjay Qiu MD at 8:03 am on   10/31/2024                 Assessment/Plan:    Active Hospital Problems    Diagnosis     Sepsis (HCC) [A41.9]     Shortness of breath [R06.02]     Swelling of lower extremity [M79.89]     Cocaine abuse (HCC) [F14.10]     Overweight (BMI 25.0-29.9) [E66.3]     History of alcoholism (HCC) [F10.21]     Avascular necrosis of bone of hip [M87.059]     Multifocal pneumonia [J18.9]     Acute respiratory failure with hypoxia [J96.01]     Septic shock (HCC) [A41.9, R65.21]          Hospital Day: 5    This is a 28

## 2024-11-05 PROBLEM — Z71.51 DRUG ABUSE COUNSELING AND SURVEILLANCE OF DRUG ABUSER: Status: ACTIVE | Noted: 2024-11-05

## 2024-11-05 PROBLEM — Z71.41 ALCOHOL CESSATION COUNSELING: Status: ACTIVE | Noted: 2024-11-05

## 2024-11-05 PROBLEM — K56.7 ILEUS (HCC): Status: ACTIVE | Noted: 2024-11-05

## 2024-11-05 LAB
ALBUMIN SERPL-MCNC: 2.6 G/DL (ref 3.4–5)
ALBUMIN/GLOB SERPL: 1 {RATIO} (ref 1.1–2.2)
ALP SERPL-CCNC: 101 U/L (ref 40–129)
ALT SERPL-CCNC: 25 U/L (ref 10–40)
ANION GAP SERPL CALCULATED.3IONS-SCNC: 13 MMOL/L (ref 3–16)
ANTI-XA UNFRAC HEPARIN: 0.35 IU/ML (ref 0.3–0.7)
AST SERPL-CCNC: 39 U/L (ref 15–37)
BILIRUB SERPL-MCNC: 0.3 MG/DL (ref 0–1)
BUN SERPL-MCNC: 6 MG/DL (ref 7–20)
CALCIUM SERPL-MCNC: 7.4 MG/DL (ref 8.3–10.6)
CHLORIDE SERPL-SCNC: 108 MMOL/L (ref 99–110)
CO2 SERPL-SCNC: 15 MMOL/L (ref 21–32)
CREAT SERPL-MCNC: 0.6 MG/DL (ref 0.9–1.3)
GFR SERPLBLD CREATININE-BSD FMLA CKD-EPI: >90 ML/MIN/{1.73_M2}
GLUCOSE SERPL-MCNC: 80 MG/DL (ref 70–99)
MAGNESIUM SERPL-MCNC: 1.69 MG/DL (ref 1.8–2.4)
POTASSIUM SERPL-SCNC: 3.1 MMOL/L (ref 3.5–5.1)
PROT SERPL-MCNC: 5.2 G/DL (ref 6.4–8.2)
SODIUM SERPL-SCNC: 136 MMOL/L (ref 136–145)

## 2024-11-05 PROCEDURE — 36415 COLL VENOUS BLD VENIPUNCTURE: CPT

## 2024-11-05 PROCEDURE — 80053 COMPREHEN METABOLIC PANEL: CPT

## 2024-11-05 PROCEDURE — 97110 THERAPEUTIC EXERCISES: CPT

## 2024-11-05 PROCEDURE — 96376 TX/PRO/DX INJ SAME DRUG ADON: CPT

## 2024-11-05 PROCEDURE — 94761 N-INVAS EAR/PLS OXIMETRY MLT: CPT

## 2024-11-05 PROCEDURE — 94669 MECHANICAL CHEST WALL OSCILL: CPT

## 2024-11-05 PROCEDURE — 97530 THERAPEUTIC ACTIVITIES: CPT

## 2024-11-05 PROCEDURE — 6370000000 HC RX 637 (ALT 250 FOR IP): Performed by: STUDENT IN AN ORGANIZED HEALTH CARE EDUCATION/TRAINING PROGRAM

## 2024-11-05 PROCEDURE — 99233 SBSQ HOSP IP/OBS HIGH 50: CPT | Performed by: INTERNAL MEDICINE

## 2024-11-05 PROCEDURE — G0378 HOSPITAL OBSERVATION PER HR: HCPCS

## 2024-11-05 PROCEDURE — 92526 ORAL FUNCTION THERAPY: CPT

## 2024-11-05 PROCEDURE — 6370000000 HC RX 637 (ALT 250 FOR IP): Performed by: INTERNAL MEDICINE

## 2024-11-05 PROCEDURE — 83735 ASSAY OF MAGNESIUM: CPT

## 2024-11-05 PROCEDURE — 2580000003 HC RX 258: Performed by: INTERNAL MEDICINE

## 2024-11-05 PROCEDURE — 6360000002 HC RX W HCPCS: Performed by: STUDENT IN AN ORGANIZED HEALTH CARE EDUCATION/TRAINING PROGRAM

## 2024-11-05 PROCEDURE — 2060000000 HC ICU INTERMEDIATE R&B

## 2024-11-05 PROCEDURE — 6360000002 HC RX W HCPCS: Performed by: INTERNAL MEDICINE

## 2024-11-05 PROCEDURE — 85520 HEPARIN ASSAY: CPT

## 2024-11-05 RX ORDER — GAUZE BANDAGE 2" X 2"
100 BANDAGE TOPICAL DAILY
Status: DISCONTINUED | OUTPATIENT
Start: 2024-11-05 | End: 2024-11-07 | Stop reason: HOSPADM

## 2024-11-05 RX ORDER — BUSPIRONE HYDROCHLORIDE 5 MG/1
5 TABLET ORAL 3 TIMES DAILY
Status: DISCONTINUED | OUTPATIENT
Start: 2024-11-05 | End: 2024-11-07 | Stop reason: HOSPADM

## 2024-11-05 RX ORDER — FOLIC ACID 1 MG/1
1 TABLET ORAL DAILY
Status: DISCONTINUED | OUTPATIENT
Start: 2024-11-05 | End: 2024-11-07 | Stop reason: HOSPADM

## 2024-11-05 RX ORDER — CASTOR OIL AND BALSAM, PERU 788; 87 MG/G; MG/G
OINTMENT TOPICAL 2 TIMES DAILY
Status: DISCONTINUED | OUTPATIENT
Start: 2024-11-05 | End: 2024-11-07 | Stop reason: HOSPADM

## 2024-11-05 RX ADMIN — PANTOPRAZOLE SODIUM 40 MG: 40 INJECTION, POWDER, FOR SOLUTION INTRAVENOUS at 09:43

## 2024-11-05 RX ADMIN — FOLIC ACID 1 MG: 1 TABLET ORAL at 09:42

## 2024-11-05 RX ADMIN — APIXABAN 10 MG: 5 TABLET, FILM COATED ORAL at 11:45

## 2024-11-05 RX ADMIN — Medication 50 MG: at 09:42

## 2024-11-05 RX ADMIN — BUSPIRONE HYDROCHLORIDE 5 MG: 5 TABLET ORAL at 15:31

## 2024-11-05 RX ADMIN — Medication 100 MG: at 09:42

## 2024-11-05 RX ADMIN — Medication 10 ML: at 09:43

## 2024-11-05 RX ADMIN — Medication: at 20:13

## 2024-11-05 RX ADMIN — Medication 10 ML: at 20:13

## 2024-11-05 RX ADMIN — MELATONIN TAB 3 MG 3 MG: 3 TAB at 20:12

## 2024-11-05 RX ADMIN — BUSPIRONE HYDROCHLORIDE 5 MG: 5 TABLET ORAL at 09:43

## 2024-11-05 RX ADMIN — APIXABAN 10 MG: 5 TABLET, FILM COATED ORAL at 20:12

## 2024-11-05 RX ADMIN — BUSPIRONE HYDROCHLORIDE 5 MG: 5 TABLET ORAL at 20:12

## 2024-11-05 RX ADMIN — HEPARIN SODIUM 22 UNITS/KG/HR: 10000 INJECTION, SOLUTION INTRAVENOUS at 04:15

## 2024-11-05 RX ADMIN — LEVOFLOXACIN 750 MG: 500 TABLET, FILM COATED ORAL at 18:16

## 2024-11-05 RX ADMIN — VITAM B12 50 MCG: 100 TAB at 09:43

## 2024-11-05 ASSESSMENT — ENCOUNTER SYMPTOMS
CONSTIPATION: 0
EYE DISCHARGE: 0
ABDOMINAL PAIN: 0
NAUSEA: 0
WHEEZING: 0
SHORTNESS OF BREATH: 0
COUGH: 0
BACK PAIN: 0
SORE THROAT: 0
DIARRHEA: 0
SINUS PAIN: 0
EYE REDNESS: 0
RHINORRHEA: 0
SINUS PRESSURE: 0

## 2024-11-05 NOTE — PROGRESS NOTES
Unable to get accurate daily weight due to bed extender being on bed.  Need to re-zero out bed when patient is OOB for better accuracy.

## 2024-11-05 NOTE — PROGRESS NOTES
Infectious Diseases   Progress Note      Admission Date: 10/31/2024  Hospital Day: Hospital Day: 6   Attending: Tom Rodríguez DO  Date of service: 11/5/2024     Chief complaint/ Reason for consult:     Acute respiratory failure with hypoxia, currently requiring high flow oxygen  Multifocal pneumonia  Diffuse maculopapular rash, likely drug rash  Septic shock with high-grade fever 102.7, tachycardia, tachypnea, hypotension requiring vasopressors  Recent history of fungemia  Prolonged hospitalization at     Microbiology:      I have reviewed allavailable micro lab data and cultures    Results       Procedure Component Value Units Date/Time    Culture, MRSA, Screening [[id number]] Collected: 10/31/24 1845    Order Status: Completed Specimen: Nares Updated: 11/01/24 1956     MRSA Culture Only --     No S aureus MRSA isolated  S aureus MSSA present      Narrative:      ORDER#: [id number]                          ORDERED BY: MAGDALENA MANZO  SOURCE: Nares                              COLLECTED:  10/31/24 18:45  ANTIBIOTICS AT [facility].:                      RECEIVED :  11/01/24 01:39    Respiratory Panel, Molecular, with COVID-19 (Restricted: peds pts or suitable admitted adults) [[id number]] Collected: 10/31/24 1845    Order Status: Completed Specimen: Nasopharyngeal Updated: 11/01/24 0308     Respiratory Panel PCR --     Respiratory Pathogens Panel PCR Result: Not Detected  See additional report for complete Respiratory Pathogens Panel      Narrative:      ORDER#: [id number]                          ORDERED BY: REECE ROBERTO  SOURCE: Nasopharyngeal                     COLLECTED:  10/31/24 18:45  ANTIBIOTICS AT [facility].:                      RECEIVED :  11/01/24 01:51    Respiratory Panel Film Array Report [[id number]] Collected: 10/31/24 1845    Order Status: Completed Updated: 11/01/24 0309     Report SEE IMAGE    Culture, Blood 1 [[id number]] Collected: 10/31/24 0949    Order Status: Completed Specimen: Blood Updated:  11/04/24 0215     Culture, Blood 2 No Growth after 4 days of incubation.    Narrative:      ORDER#: [id number]                          ORDERED BY: MAGDALENA MANZO  SOURCE: Blood Antecubital-Lef              COLLECTED:  10/31/24 00:37  ANTIBIOTICS AT [facility].:                      RECEIVED :  10/31/24 09:40  If child <=2 yrs old please draw pediatric bottle.~Blood Culture #2    Culture, Fungus, Blood [[id number]] Collected: 10/31/24 0036    Order Status: Sent Specimen: Blood Updated: 10/31/24 0940    Culture, Fungus, Blood [[id number]]     Order Status: Canceled Specimen: Blood              No results found for the last 90 days.         Antibiotics and immunizations:       Current antibiotics: All antibiotics and their doses were reviewed by me    Recent Abx Admin                     levoFLOXacin (LEVAQUIN) 750 MG/150ML infusion 750 mg (mg) 750 mg New Bag 11/04/24 1740                      Immunization History: All immunization history was reviewed by me today.    Immunization History   Administered Date(s) Administered    COVID-19, PFIZER PURPLE top, DILUTE for use, (age 12 y+), 30mcg/0.3mL 03/30/2021, 04/27/2021       Known drug allergies:     All allergies were reviewed and updated    No Known Allergies    Social history:     Social History:  All social andepidemiologic history was reviewed and updated by me today as needed.     Tobacco use:   has no history on file for tobacco use.  Alcohol use:   has no history on file for alcohol use.  Currently lives in: [place] [id number]   has no history on file for drug use.     COVID VACCINATION AND LAB RESULT RECORDS:     Internal Administration   First Dose COVID-19, PFIZER PURPLE top, DILUTE for use, (age 12 y+), 30mcg/0.3mL  03/30/2021   Second Dose COVID-19, PFIZER PURPLE top, DILUTE for use, (age 12 y+), 30mcg/0.3mL   04/27/2021       Last COVID Lab No results found for: \"SARS-COV-2\"         Assessment:     The patient is a 28 y.o. old male who  has a past medical history    Lymphadenopathy:      Cervical: No cervical adenopathy.   Skin:     General: Skin is warm and dry.      Coloration: Skin is not jaundiced.      Findings: No bruising, erythema or rash.   Neurological:      General: No focal deficit present.      Mental Status: He is alert and oriented to person, place, and time. Mental status is at baseline.      Motor: No abnormal muscle tone.   Psychiatric:         Mood and Affect: Mood normal.         Behavior: Behavior normal.          *    Intake and output:    I/O last 3 completed shifts:  In: 3406.4 [P.O.:60; I.V.:1168.7; IV Piggyback:2177.7]  Out: 1525 [Urine:1525]    Lab Data:   All available labs and old records have been reviewed by me.    CBC:  Recent Labs     11/02/24  1614 11/03/24  0442 11/04/24  0826   WBC  --  2.3* 2.4*   RBC  --  2.65* 2.85*   HGB 8.0* 7.9* 8.3*   HCT 24.3* 24.4* 26.0*   PLT  --  201 195   MCV  --  92.3 91.4   MCH  --  29.7 29.1   MCHC  --  32.1 31.9   RDW  --  15.0 15.3   BANDSPCT  --   --  3        BMP:  Recent Labs     11/03/24  2359 11/04/24  0826 11/05/24  0436    136 136   K 2.8* 3.4* 3.1*    108 108   CO2 19* 20* 15*   BUN 10 7 6*   CREATININE 0.7* 0.6* 0.6*   CALCIUM 7.1* 7.2* 7.4*   GLUCOSE 83 84 80        Hepatic Function Panel:   Lab Results   Component Value Date/Time    ALKPHOS 101 11/05/2024 04:36 AM    ALT 25 11/05/2024 04:36 AM    AST 39 11/05/2024 04:36 AM    BILITOT 0.3 11/05/2024 04:36 AM       CPK: No results found for: \"CKTOTAL\"  ESR:   Lab Results   Component Value Date    SEDRATE 20 (H) 10/31/2024     CRP:   Lab Results   Component Value Date    CRP 72.7 (H) 10/31/2024           Imaging:    All pertinent images and reports for the current visit were reviewed by me during this visit.  I reviewed the chest x-ray/CT scan/MRI images today and independently interpreted the findings and results today.    XR ABDOMEN (KUB) (SINGLE AP VIEW)   Final Result   No significant interval change.         Vascular duplex lower

## 2024-11-05 NOTE — CONSULTS
Jm Snow  11/5/2024  [id number]    Chief Complaint: Sepsis (HCC)    Subjective   HPI: Jm Snow is a 28 y.o. male with PMHx notable for EtOH use disorder, anxiety and HTN who [place] [facility] [place] with sepsis on 9/30, found to have fungenemia of unclear source, possibly secondary to bilateral hip AVN with septic arthritis (however, left hip aspirate cultures with no growth).  He completed 7 days of empiric antibiotics.  Hospital course was complicated by severe TONYA acute renal failure due to ATN, requiring hemodialysis with now recovered renal function.  He additionally developed acute hypoxic respiratory failure secondary to ARDS and aspiration pneumonia requiring intubation on 10/12.  He was transferred to [facility] MICU on 10/12 for further management. Extubated on 10/18.  Hospital course was further complicated by encephalopathy with agitation and psychosis (episodes of hallucination), suspected multifactorial due to metabolic derangements, active infection and possible alcohol withdrawal, as well as ileus.     Developed tachycardia and fever shortly after ARU admission on 10/30, and was discharged acutely to internal medicine service in acute hospital for further workup and management early morning on 10/31.  He developed hypoxia, requiring intermittent high flow oxygen.  CTA chest showed left upper lobe PE, as well as bilateral pleural effusions and pneumonia.  He was restarted on broad-spectrum IV antibiotics, and ID was consulted. Blood cultures no growth to date. Antibiotics have now been narrowed to Levaquin 750 mg IV q24h. He was started on heparin drip for PE, which has now been discontinued in favor of oral anticoagulation with Eliquis.  GI was consulted for mild ileus, managed with NG tube decompression.  Podiatry was consulted for lesions of the left hallux and fourth toe, felt to be eschar from pressor-induced tissue necrosis.    Patient reports that he is doing well today.  His biggest

## 2024-11-05 NOTE — PROGRESS NOTES
Speech Language Pathology  [facility] - Inpatient Rehabilitation Services  [phone]  SLP Dysphagia Treatment       Patient: Jm Snow   : 1995   MRN: [id number]      Evaluation Date: 2024      Admitting Dx: Sepsis (HCC) [A41.9]  Treatment Diagnosis: Oropharyngeal Dysphagia   Pain: Did not state                                  Recommendations      Recommended Diet and Intervention 2024:  Diet Solids Recommendation:  Regular texture diet  Liquid Consistency Recommendation:  Thin liquids  Recommended form of Meds: Meds in puree      **Feeding assistance, as needed     Compensatory strategies: Upright as possible with all PO intake , No straws , Small bites/sips , Eat/feed slowly, Aspiration Precautions     Discharge Recommendations:  Recommend ongoing SLP for dysphagia therapy upon discharge from hospital with further evaluation of speech/cognitive function.     History/Course of Treatment     H&P:   Patient was admitted from ARU 10/31 due to fever and recently having been admitted to the ARU after a 5 week hospital stay. He was febrile and tachycardic and did not look good.  He went to   and there was a rapid response right about 7 am.  Patient was dropping oxygen sats and having increased 02 requirement.  He was transferred to [address] [place] and placed on 15 L of high flow oxygen.  At about 830 there was a second rapid response called this time on [address] [place] due to the patient not maintaining his oxygen saturation despite the 15 L of oxygen.  The patient was dropping his sats into the mid 80s.  After his first rapid response a CT chest abdomen pelvis was ordered which revealed bilateral pleural effusions and consolidations as well as the potential of a PE.  The results came back just at the time of the second rapid response.  Based on the patient's increased oxygen requirement we placed the patient on heated high flow oxygen 60 L a minute at 100% FiO2 and transferred him to the ICU.       Dysphagia Treatment:     Assessment of Texture Tolerance:  Diet level prior to treatment: Dysphagia III Soft and bite sized , Thin liquids   Tolerance of Current Diet Level:Per chart, no noted difficulty with current diet level  RN reported pt appears to be tolerating current diet level      -Impressions: Pt was positioned Upright in bed , awake and alert. Currently on room air. NG tube removed yesterday, per RN. Trials of thin liquids, soft and bite sized solids , and regular solids  were provided to assess swallow function. Pt demonstrating difficulty with self feeding due to tremors, requested SLP's assistance. Pt demonstrated slow and prolonged mastication with effective oral clearing and no overt signs/symptoms of penetration/aspiration with soft solids and regular solids. Thin liquids via straw revealed no immediate overt signs/symptoms of aspiration/penetration. Education completed with the pt to available diet consistencies, pt in agreement with diet upgrade and reports a plan to request softer options. Pt demonstrates increased risk for aspiration due to co morbidities , deconditioning , and prior hx of dysphagia . At this time recommend Regular texture diet  with Thin liquids and feeding assistance (as needed), Meds in puree  with use of compensatory swallow strategies (see above). If the pt demonstrates a decline in respiratory status or overt signs/symptoms of penetration/aspiration, recommend downgrade to NPO with ongoing assessment by ST.      Eating Assistance:   Supervision or touching assistance    Assessment: Pt progressing toward goals           Goals     Goals:  Dysphagia Goals: Pt will functionally tolerate recommended diet with no overt clinical s/s of aspiration   Pt will functionally tolerate ongoing assessment of swallow function with diet to be determined as indicated   Pt will advance to least restrictive diet as indicated     Above goals reviewed on 11/5/2024. All goals are ongoing at  this time unless indicated above.      POC/Education     Dysphagia Therapeutic Intervention:  Diet Tolerance Monitoring , Patient/Family Education , Therapeutic Trials with SLP     Plan of care: 3-5 times per week during acute care stay.      Education:  Provided education regarding role of SLP, results of assessment, recommendations and general speech pathology plan of care:  Pt verbalized understanding and agreement   Pt requires ongoing learning     If patient discharges prior to next visit, this note will serve as discharge.     Treatment time:  Timed Code Treatment Minutes: 0  Total Treatment Time Minutes: 11    Electronically signed by:    Charlotte Angulo M.S. CCC-SLP #[id number]  Speech-Language Pathologist

## 2024-11-05 NOTE — PROGRESS NOTES
[facility] - Inpatient Rehabilitation Department   Phone: [phone]    Physical Therapy    [] Initial Evaluation            [x] Daily Treatment Note         [] Discharge Summary      Patient: Jm Snow   : 1995   MRN: [id number]   Date of Service:  2024  Admitting Diagnosis: Sepsis (HCC)  Current Admission Summary: Debility due to septic shock  History of Present Illness/Hospital Course:  Jm Snow is a 28 y.o. male with PMHx notable for EtOH use disorder, anxiety and HTN who [place] [facility] [place]  with sepsis on , found to have fungenemia of unclear source, possibly secondary to bilateral hip AVN with septic arthritis (however, left hip aspirate cultures with no growth).  He completed 7 days of empiric antibiotics.  Hospital course was complicated by severe TONYA acute renal failure due to ATN, requiring hemodialysis with now recovered renal function.  He additionally developed acute hypoxic respiratory failure secondary to ARDS and aspiration pneumonia requiring intubation on 10/12.  He was transferred to [facility] MICU on 10/12 for further management.  Extubated on 10/18. Hospital course was further complicated by encephalopathy with agitation and psychosis (episodes of hallucination), suspected multifactorial due to metabolic derangements, active infection and possible alcohol withdrawal, as well as ileus.    Hospital Course: Patient was admitted from ARU 10/31 due to fever and recently having been admitted to the ARU after a 5 week hospital stay. He was febrile and tachycardic and did not look good. He went to   and there was a rapid response right about 7 am.  Patient was dropping oxygen sats and having increased 02 requirement.  He was transferred to 3 [place] and placed on 15 L of high flow oxygen. At about 830 there was a second rapid response called this time on [place] due to the patient not maintaining his oxygen saturation despite the 15 L of oxygen.  The patient  off ARU to medical floor where he was eventually placed on 15L O2 and eventually 60L [place] and transferred to ICU.      Today, he is requiring MAX assist for rolling and is dependent with maxisky transfer from bed to recliner. When using stedy from EOB, Pt requires max A of 2 for bed mobility and transfers. Pt able to tolerate sitting without posterior support but is unable to tolerate prolonged standing or attempt ambulating. Recommend 24 hour assist and continued therapy at d/c.      Safety Interventions: patient left in chair, chair alarm in place, call light within reach, gait belt, patient at risk for falls, nurse notified, and family/caregiver present    Plan  Frequency: 5-7 x/week  Current Treatment Recommendations: strengthening, ROM, balance training, functional mobility training, transfer training, gait training, endurance training, neuromuscular re-education, wheelchair mobility training, patient/caregiver education, ADL/self-care training, IADL training, home exercise program, safety education, and equipment evaluation/education    Goals  Patient Goals: wheelchair mobility, walk with cane and take a shower    Short Term Goals:  Time Frame: by d/c   Pt will complete rolling with Max A - Met 11/5   Pt will complete supine to sit with Max A    Pt will sit EOB for 3-5 mins with Max A    Pt will complete squat pivot transfer with Max A of 2    Pt will complete full stand with use of JERMAIN STEDY and Max A of 2      Pt will complete rolling with mod A      Above goals reviewed on 11/5/2024.  All goals are ongoing at this time unless indicated above.      Therapy Session Time      Individual Group Co-treatment   Time In     1031   Time Out     1139   Minutes     68     Timed Code Treatment Minutes:  68 Minutes  Total Treatment Minutes:  68 Minutes     Electronically Signed By: DANIEL Salinas    PT providing direct supervision during session and assisting in making skilled judgements throughout session.

## 2024-11-05 NOTE — CARE COORDINATION
Wound Referral Progress Note       NAME:  Jm Snow  MEDICAL RECORD NUMBER:  [id number]  AGE: 28 y.o.   GENDER: male  : 1995  TODAY'S DATE:  2024    Subjective   Reason for WOCN Evaluation and Assessment: wound to right groin      Jm Snow is a 28 y.o. male referred by:   Provider and Nursing    Wound Identification:  Wound Type:  moisture associated   Contributing Factors: none    Wound History: noted by nurse at assessment  Current Wound Care Treatment:  zinc paste     Patient Care Goal:  Wound Healing        PAST MEDICAL HISTORY        Diagnosis Date    Acute anxiety 10/19/2024    Acute HFrEF (heart failure with reduced ejection fraction) ([facility]) 10/25/2024    TONYA (acute kidney injury) ([facility]) 10/25/2024    Anasarca 10/25/2024    ARDS (adult respiratory distress syndrome) 10/19/2024    ATN (acute tubular necrosis) ([facility]) 10/25/2024    Constipation 10/27/2024    ETOH abuse 10/19/2024    Fungemia 10/19/2024    Ileus ([facility]) 10/27/2024    Prolonged Q-T interval on ECG 10/25/2024    Septic shock ([facility]) 10/19/2024    Secondary to Fungemia       PAST SURGICAL HISTORY    History reviewed. No pertinent surgical history.    FAMILY HISTORY    No family history on file.    SOCIAL HISTORY         ALLERGIES    No Known Allergies    MEDICATIONS    No current facility-administered medications on file prior to encounter.     Current Outpatient Medications on File Prior to Encounter   Medication Sig Dispense Refill    busPIRone (BUSPAR) 5 MG tablet Take 1 tablet by mouth 3 times daily      folic acid (FOLVITE) 1 MG tablet Take 1 tablet by mouth daily      Multiple Vitamins-Minerals (THERAPEUTIC MULTIVITAMIN-MINERALS) tablet Take 1 tablet by mouth daily      vitamin B-1 (THIAMINE) 100 MG tablet Take 1 tablet by mouth daily      simethicone (MYLICON) 80 MG chewable tablet Take 1 tablet by mouth 4 times daily (after meals and at bedtime)      polyethylene glycol (MIRALAX) 17 g PACK packet Take 17 g by mouth 2  times daily      escitalopram (LEXAPRO) 20 MG tablet Take 1 tablet by mouth daily         Objective    /62   Pulse (!) 111   Temp 98.3 °F (36.8 °C) (Temporal)   Resp 20   Ht 1.93 m (6' 4\")   Wt 113.1 kg (249 lb 5.4 oz)   SpO2 96%   BMI 30.35 kg/m²     LABS:  WBC:    Lab Results   Component Value Date/Time    WBC 2.4 11/04/2024 08:26 AM     H/H:    Lab Results   Component Value Date/Time    HGB 8.3 11/04/2024 08:26 AM    HCT 26.0 11/04/2024 08:26 AM     PTT:    Lab Results   Component Value Date/Time    APTT 30.0 10/31/2024 09:03 AM   [APTT}  PT/INR:    Lab Results   Component Value Date/Time    PROTIME 16.1 10/31/2024 09:03 AM    INR 1.27 10/31/2024 09:03 AM     HgBA1c:  No results found for: \"LABA1C\"    Assessment   Naveen Risk Score: Naveen Scale Score: 15    Patient Active Problem List   Diagnosis Code    Septic shock ([facility]) A41.9, R65.21    Sepsis ([facility]) A41.9    Shortness of breath R06.02    Swelling of lower extremity M79.89    Cocaine abuse ([facility]) F14.10    Overweight (BMI 25.0-29.9) E66.3    History of alcoholism ([facility]) F10.21    Avascular necrosis of bone of hip M87.059    Multifocal pneumonia J18.9    Acute respiratory failure with hypoxia J96.01    Ileus ([facility]) K56.7       Measurements:  Wound 10/30/24 Toe (Comment  which one) Left unstageable  Lgrt toe 1st,2nd,4th digits (Active)   Wound Etiology Deep tissue/Injury 11/04/24 2000   Dressing Status Other (Comment) 11/01/24 0800   Wound Cleansed Betadine/povidone iodine 11/04/24 2000   Dressing/Treatment Open to air 11/04/24 2000   Wound Assessment Purple/maroon;Eschar dry 11/04/24 2000   Drainage Amount None (dry) 11/04/24 2000   Odor None 11/04/24 2000   Brianne-wound Assessment Dry/flaky 11/04/24 2000   Number of days: 5       Wound 11/04/24 Groin Proximal;Right;Anterior abrasion to right upper  groin fold (Active)   Wound Image   11/05/24 1202   Wound Etiology Other 11/05/24 1202   Dressing Status Other (Comment) 11/05/24 1202   Wound Length (cm)

## 2024-11-05 NOTE — CARE COORDINATION
Prior Authorization submitted for ARU approval with a reference number: [id number]     ARU will continue to follow progress and update discharge plan as needed.    CHRISTINE AmbrosioN, .[phone]

## 2024-11-05 NOTE — PROGRESS NOTES
Hospitalist Progress Note    Name:  Jm Snow    /Age/Sex: 1995  (28 y.o. male)  MRN & CSN:  [id number] & [id number]    PCP: No primary care provider on file.    Date of Admission: 10/31/2024    Patient Status:  Inpatient     Chief Complaint: No chief complaint on file.      Hospital Course:   Patient admitted from ARU on 10/31 due to fever.  Patient had a bout of hypoxia requiring intermittent high flow oxygen.  Patient continued to be hypoxic despite supplemental oxygen.  CT chest showed bilateral pleural effusions as well as PE.  Patient started on heparin drip.  CCM consulted.    Imaging also indicative of possible ileus.  Patient did require NG tube to suction, but patient did show improvement and had bowel movements shortly after NGT placement.    Patient started on levofloxacin for possible pneumonia.  ID consulted.      Subjective:  Today is:  Hospital Day: 6.  Patient seen and examined in 3TN-3362/3362-01.     Sitting up in chair.  Denies abdominal pain.  Eating okay.  Having bowel movements.    Mother at bedside and updated.      Medications:  Reviewed    Infusion Medications    sodium chloride      sodium chloride      sodium chloride Stopped (10/31/24 1407)     Scheduled Medications    vitamin B-1  100 mg Oral Daily    busPIRone  5 mg Oral TID    folic acid  1 mg Oral Daily    apixaban  10 mg Oral BID    Followed by    [START ON 2024] apixaban  5 mg Oral BID    zinc sulfate  50 mg Oral Daily    vitamin B-12  50 mcg Oral Daily    sodium chloride flush  5-40 mL IntraVENous 2 times per day    sodium chloride flush  10 mL IntraVENous 2 times per day    escitalopram  20 mg Oral Daily    [Held by provider] valsartan  80 mg Oral Daily    pantoprazole  40 mg IntraVENous Daily    levofloxacin  750 mg IntraVENous Q24H     PRN Meds: sodium chloride flush, sodium chloride, sodium chloride, LORazepam, potassium chloride **OR** potassium alternative oral replacement **OR** potassium chloride,

## 2024-11-05 NOTE — PROGRESS NOTES
[facility] - Inpatient Rehabilitation Department   Phone: [phone]    Occupational Therapy    [] Initial Evaluation            [x] Daily Treatment Note         [] Discharge Summary      Patient: Jm Snow   : 1995   MRN: [id number]   Date of Service:  2024    Admitting Diagnosis:  Sepsis (HCC)  Current Admission Summary: 28 y.o. male with PMHx notable for EtOH use disorder, anxiety and HTN who presented to [facility] [place] on  with sepsis on , found to have fungenemia of unclear source, possibly secondary to bilateral hip AVN with septic arthritis (however, left hip aspirate cultures with no growth).  He completed 7 days of empiric antibiotics.  Hospital course was complicated by severe TONYA acute renal failure due to ATN, requiring hemodialysis with now recovered renal function.  He additionally developed acute hypoxic respiratory failure secondary to ARDS and aspiration pneumonia requiring intubation on 10/12.  He was transferred to [facility] MICU on 10/12 for further management.  Extubated on 10/18. Hospital course was further complicated by encephalopathy with agitation and psychosis (episodes of hallucination), suspected multifactorial due to metabolic derangements, active infection and possible alcohol withdrawal, as well as ileus. Admitted to ARU but required discharge to ICU after multiple rapid responses, tachycardia, with concern for PE     Past Medical History:  has a past medical history of Acute anxiety, Acute HFrEF (heart failure with reduced ejection fraction) ([facility]), TONYA (acute kidney injury) ([facility]), Anasarca, ARDS (adult respiratory distress syndrome), ATN (acute tubular necrosis) ([facility]), Constipation, ETOH abuse, Fungemia, Ileus ([facility]), Prolonged Q-T interval on ECG, and Septic shock ([facility]).  Past Surgical History:  has no past surgical history on file.    Discharge Recommendations: Jm Snow scored a 9/24 on the AM-PAC ADL Inpatient form. Current research shows that an

## 2024-11-06 PROBLEM — R65.20 SEPSIS WITH ACUTE HYPOXIC RESPIRATORY FAILURE WITHOUT SEPTIC SHOCK (HCC): Status: ACTIVE | Noted: 2024-10-31

## 2024-11-06 PROBLEM — R06.02 SHORTNESS OF BREATH: Status: RESOLVED | Noted: 2024-10-31 | Resolved: 2024-11-06

## 2024-11-06 PROBLEM — J96.01 SEPSIS WITH ACUTE HYPOXIC RESPIRATORY FAILURE WITHOUT SEPTIC SHOCK (HCC): Status: ACTIVE | Noted: 2024-10-31

## 2024-11-06 PROBLEM — I26.99 PULMONARY EMBOLISM (HCC): Status: ACTIVE | Noted: 2024-11-06

## 2024-11-06 PROBLEM — R65.21 SEPTIC SHOCK (HCC): Status: RESOLVED | Noted: 2024-10-30 | Resolved: 2024-11-06

## 2024-11-06 PROBLEM — A41.9 SEPTIC SHOCK (HCC): Status: RESOLVED | Noted: 2024-10-30 | Resolved: 2024-11-06

## 2024-11-06 PROBLEM — A41.9 SEPSIS (HCC): Status: RESOLVED | Noted: 2024-10-31 | Resolved: 2024-11-06

## 2024-11-06 LAB
ALBUMIN SERPL-MCNC: 2.6 G/DL (ref 3.4–5)
ALBUMIN SERPL-MCNC: 2.8 G/DL (ref 3.4–5)
ALBUMIN/GLOB SERPL: 1.3 {RATIO} (ref 1.1–2.2)
ALP SERPL-CCNC: 110 U/L (ref 40–129)
ALT SERPL-CCNC: 17 U/L (ref 10–40)
ANION GAP SERPL CALCULATED.3IONS-SCNC: 11 MMOL/L (ref 3–16)
ANION GAP SERPL CALCULATED.3IONS-SCNC: 12 MMOL/L (ref 3–16)
ANISOCYTOSIS BLD QL SMEAR: ABNORMAL
AST SERPL-CCNC: 33 U/L (ref 15–37)
BASOPHILS # BLD: 0 K/UL (ref 0–0.2)
BASOPHILS NFR BLD: 0 %
BILIRUB SERPL-MCNC: 0.5 MG/DL (ref 0–1)
BUN SERPL-MCNC: 4 MG/DL (ref 7–20)
BUN SERPL-MCNC: 5 MG/DL (ref 7–20)
CALCIUM SERPL-MCNC: 7.1 MG/DL (ref 8.3–10.6)
CALCIUM SERPL-MCNC: 7.4 MG/DL (ref 8.3–10.6)
CHLORIDE SERPL-SCNC: 108 MMOL/L (ref 99–110)
CHLORIDE SERPL-SCNC: 109 MMOL/L (ref 99–110)
CO2 SERPL-SCNC: 19 MMOL/L (ref 21–32)
CO2 SERPL-SCNC: 20 MMOL/L (ref 21–32)
CREAT SERPL-MCNC: 0.5 MG/DL (ref 0.9–1.3)
CREAT SERPL-MCNC: 0.5 MG/DL (ref 0.9–1.3)
DEPRECATED RDW RBC AUTO: 15.3 % (ref 12.4–15.4)
EOSINOPHIL # BLD: 0.1 K/UL (ref 0–0.6)
EOSINOPHIL NFR BLD: 4 %
GFR SERPLBLD CREATININE-BSD FMLA CKD-EPI: >90 ML/MIN/{1.73_M2}
GFR SERPLBLD CREATININE-BSD FMLA CKD-EPI: >90 ML/MIN/{1.73_M2}
GLUCOSE SERPL-MCNC: 85 MG/DL (ref 70–99)
GLUCOSE SERPL-MCNC: 94 MG/DL (ref 70–99)
HCT VFR BLD AUTO: 25.6 % (ref 40.5–52.5)
HGB BLD-MCNC: 8.4 G/DL (ref 13.5–17.5)
LYMPHOCYTES # BLD: 0.6 K/UL (ref 1–5.1)
LYMPHOCYTES NFR BLD: 24 %
MAGNESIUM SERPL-MCNC: 1.18 MG/DL (ref 1.8–2.4)
MCH RBC QN AUTO: 29.7 PG (ref 26–34)
MCHC RBC AUTO-ENTMCNC: 32.7 G/DL (ref 31–36)
MCV RBC AUTO: 90.9 FL (ref 80–100)
METAMYELOCYTES NFR BLD MANUAL: 2 %
MICROCYTES BLD QL SMEAR: ABNORMAL
MONOCYTES # BLD: 0.3 K/UL (ref 0–1.3)
MONOCYTES NFR BLD: 10 %
NEUTROPHILS # BLD: 1.6 K/UL (ref 1.7–7.7)
NEUTROPHILS NFR BLD: 60 %
PHOSPHATE SERPL-MCNC: 1.7 MG/DL (ref 2.5–4.9)
PLATELET # BLD AUTO: 187 K/UL (ref 135–450)
PMV BLD AUTO: 8.7 FL (ref 5–10.5)
POTASSIUM SERPL-SCNC: 2.6 MMOL/L (ref 3.5–5.1)
POTASSIUM SERPL-SCNC: 3.6 MMOL/L (ref 3.5–5.1)
PROT SERPL-MCNC: 5 G/DL (ref 6.4–8.2)
RBC # BLD AUTO: 2.81 M/UL (ref 4.2–5.9)
SODIUM SERPL-SCNC: 139 MMOL/L (ref 136–145)
SODIUM SERPL-SCNC: 140 MMOL/L (ref 136–145)
WBC # BLD AUTO: 2.6 K/UL (ref 4–11)
ZINC SERPL-MCNC: 50.6 UG/DL (ref 60–120)

## 2024-11-06 PROCEDURE — 2060000000 HC ICU INTERMEDIATE R&B

## 2024-11-06 PROCEDURE — 2580000003 HC RX 258: Performed by: INTERNAL MEDICINE

## 2024-11-06 PROCEDURE — 99232 SBSQ HOSP IP/OBS MODERATE 35: CPT | Performed by: INTERNAL MEDICINE

## 2024-11-06 PROCEDURE — 97110 THERAPEUTIC EXERCISES: CPT

## 2024-11-06 PROCEDURE — 97530 THERAPEUTIC ACTIVITIES: CPT

## 2024-11-06 PROCEDURE — 6370000000 HC RX 637 (ALT 250 FOR IP): Performed by: INTERNAL MEDICINE

## 2024-11-06 PROCEDURE — 6370000000 HC RX 637 (ALT 250 FOR IP): Performed by: STUDENT IN AN ORGANIZED HEALTH CARE EDUCATION/TRAINING PROGRAM

## 2024-11-06 PROCEDURE — 96376 TX/PRO/DX INJ SAME DRUG ADON: CPT

## 2024-11-06 PROCEDURE — 80053 COMPREHEN METABOLIC PANEL: CPT

## 2024-11-06 PROCEDURE — 2580000003 HC RX 258: Performed by: STUDENT IN AN ORGANIZED HEALTH CARE EDUCATION/TRAINING PROGRAM

## 2024-11-06 PROCEDURE — G0378 HOSPITAL OBSERVATION PER HR: HCPCS

## 2024-11-06 PROCEDURE — 6360000002 HC RX W HCPCS: Performed by: NURSE PRACTITIONER

## 2024-11-06 PROCEDURE — 96366 THER/PROPH/DIAG IV INF ADDON: CPT

## 2024-11-06 PROCEDURE — 83735 ASSAY OF MAGNESIUM: CPT

## 2024-11-06 PROCEDURE — 36415 COLL VENOUS BLD VENIPUNCTURE: CPT

## 2024-11-06 PROCEDURE — 6370000000 HC RX 637 (ALT 250 FOR IP): Performed by: NURSE PRACTITIONER

## 2024-11-06 PROCEDURE — 96367 TX/PROPH/DG ADDL SEQ IV INF: CPT

## 2024-11-06 PROCEDURE — 6360000002 HC RX W HCPCS: Performed by: INTERNAL MEDICINE

## 2024-11-06 PROCEDURE — 85025 COMPLETE CBC W/AUTO DIFF WBC: CPT

## 2024-11-06 PROCEDURE — 2500000003 HC RX 250 WO HCPCS: Performed by: STUDENT IN AN ORGANIZED HEALTH CARE EDUCATION/TRAINING PROGRAM

## 2024-11-06 RX ORDER — POTASSIUM CHLORIDE 1500 MG/1
40 TABLET, EXTENDED RELEASE ORAL ONCE
Status: COMPLETED | OUTPATIENT
Start: 2024-11-06 | End: 2024-11-06

## 2024-11-06 RX ORDER — GABAPENTIN 100 MG/1
100 CAPSULE ORAL 2 TIMES DAILY
Status: DISCONTINUED | OUTPATIENT
Start: 2024-11-06 | End: 2024-11-07 | Stop reason: HOSPADM

## 2024-11-06 RX ORDER — TRAZODONE HYDROCHLORIDE 50 MG/1
50 TABLET, FILM COATED ORAL NIGHTLY PRN
Status: DISCONTINUED | OUTPATIENT
Start: 2024-11-06 | End: 2024-11-07 | Stop reason: HOSPADM

## 2024-11-06 RX ADMIN — GABAPENTIN 100 MG: 100 CAPSULE ORAL at 21:26

## 2024-11-06 RX ADMIN — ESCITALOPRAM OXALATE 20 MG: 10 TABLET ORAL at 09:29

## 2024-11-06 RX ADMIN — POTASSIUM BICARBONATE 40 MEQ: 782 TABLET, EFFERVESCENT ORAL at 00:59

## 2024-11-06 RX ADMIN — Medication 10 ML: at 09:30

## 2024-11-06 RX ADMIN — Medication: at 21:26

## 2024-11-06 RX ADMIN — Medication 10 ML: at 21:26

## 2024-11-06 RX ADMIN — POTASSIUM CHLORIDE 10 MEQ: 10 INJECTION, SOLUTION INTRAVENOUS at 06:07

## 2024-11-06 RX ADMIN — Medication: at 09:30

## 2024-11-06 RX ADMIN — Medication 50 MG: at 09:28

## 2024-11-06 RX ADMIN — APIXABAN 10 MG: 5 TABLET, FILM COATED ORAL at 21:26

## 2024-11-06 RX ADMIN — GABAPENTIN 100 MG: 100 CAPSULE ORAL at 13:38

## 2024-11-06 RX ADMIN — BUSPIRONE HYDROCHLORIDE 5 MG: 5 TABLET ORAL at 09:29

## 2024-11-06 RX ADMIN — POTASSIUM BICARBONATE 50 MEQ: 978 TABLET, EFFERVESCENT ORAL at 09:28

## 2024-11-06 RX ADMIN — TRAZODONE HYDROCHLORIDE 50 MG: 50 TABLET ORAL at 21:27

## 2024-11-06 RX ADMIN — POTASSIUM CHLORIDE 10 MEQ: 10 INJECTION, SOLUTION INTRAVENOUS at 06:06

## 2024-11-06 RX ADMIN — BUSPIRONE HYDROCHLORIDE 5 MG: 5 TABLET ORAL at 13:38

## 2024-11-06 RX ADMIN — VITAM B12 50 MCG: 100 TAB at 09:28

## 2024-11-06 RX ADMIN — LEVOFLOXACIN 750 MG: 500 TABLET, FILM COATED ORAL at 09:29

## 2024-11-06 RX ADMIN — BUSPIRONE HYDROCHLORIDE 5 MG: 5 TABLET ORAL at 21:26

## 2024-11-06 RX ADMIN — APIXABAN 10 MG: 5 TABLET, FILM COATED ORAL at 09:29

## 2024-11-06 RX ADMIN — PANTOPRAZOLE SODIUM 40 MG: 40 INJECTION, POWDER, FOR SOLUTION INTRAVENOUS at 09:30

## 2024-11-06 RX ADMIN — POTASSIUM CHLORIDE 40 MEQ: 1500 TABLET, EXTENDED RELEASE ORAL at 09:28

## 2024-11-06 RX ADMIN — POTASSIUM PHOSPHATE 30 MMOL: 236; 224 INJECTION, SOLUTION INTRAVENOUS at 09:47

## 2024-11-06 RX ADMIN — Medication 100 MG: at 09:29

## 2024-11-06 RX ADMIN — FOLIC ACID 1 MG: 1 TABLET ORAL at 09:28

## 2024-11-06 ASSESSMENT — ENCOUNTER SYMPTOMS
EYE REDNESS: 0
NAUSEA: 0
EYE DISCHARGE: 0
RHINORRHEA: 0
ABDOMINAL PAIN: 0
BACK PAIN: 0
COUGH: 0
SHORTNESS OF BREATH: 0
WHEEZING: 0
SINUS PAIN: 0
SINUS PRESSURE: 0
SORE THROAT: 0
CONSTIPATION: 0
DIARRHEA: 0

## 2024-11-06 ASSESSMENT — PAIN SCALES - GENERAL
PAINLEVEL_OUTOF10: 0
PAINLEVEL_OUTOF10: 0

## 2024-11-06 NOTE — PROGRESS NOTES
[facility] - Inpatient Rehabilitation Department   Phone: [phone]    Occupational Therapy    [] Initial Evaluation            [x] Daily Treatment Note         [] Discharge Summary      Patient: Jm Snow   : 1995   MRN: [id number]   Date of Service:  2024    Admitting Diagnosis:  Acute respiratory failure with hypoxia  Current Admission Summary: 28 y.o. male with PMHx notable for EtOH use disorder, anxiety and HTN who [place] [facility] [place] on , found to have fungenemia of unclear source, possibly secondary to bilateral hip AVN with septic arthritis (however, left hip aspirate cultures with no growth).  He completed 7 days of empiric antibiotics.  Hospital course was complicated by severe TONYA acute renal failure due to ATN, requiring hemodialysis with now recovered renal function.  He additionally developed acute hypoxic respiratory failure secondary to ARDS and aspiration pneumonia requiring intubation on 10/12.  He was transferred to [facility] MICU on 10/12 for further management.  Extubated on 10/18. Hospital course was further complicated by encephalopathy with agitation and psychosis (episodes of hallucination), suspected multifactorial due to metabolic derangements, active infection and possible alcohol withdrawal, as well as ileus. Admitted to ARU but required discharge to ICU after multiple rapid responses, tachycardia, with concern for PE     Past Medical History:  has a past medical history of Acute anxiety, Acute HFrEF (heart failure with reduced ejection fraction) ([facility]), TONYA (acute kidney injury) ([facility]), Anasarca, ARDS (adult respiratory distress syndrome), ATN (acute tubular necrosis) ([facility]), Constipation, ETOH abuse, Fungemia, Ileus ([facility]), Prolonged Q-T interval on ECG, and Septic shock ([facility]).  Past Surgical History:  has no past surgical history on file.    Discharge Recommendations: Jm Snow scored a 10/24 on the AM-PAC ADL Inpatient form.  above.       Therapy Session Time     Individual Group Co-treatment   Time In    1042   Time Out    1141   Minutes    59       Timed Code Treatment Minutes: 59 Minutes  Total Treatment Minutes:  59       Electronically Signed By: Alanna GARCIA OTR/L [id number]

## 2024-11-06 NOTE — PROGRESS NOTES
Hospitalist Progress Note    Name:  Jm Snow    /Age/Sex: 1995  (28 y.o. male)  MRN & CSN:  [id number] & [id number]    PCP: No primary care provider on file.    Date of Admission: 10/31/2024    Patient Status:  Inpatient     Chief Complaint: No chief complaint on file.      Hospital Course:   Patient admitted from ARU on 10/31 due to fever.  Patient had a bout of hypoxia requiring intermittent high flow oxygen.  Patient continued to be hypoxic despite supplemental oxygen.  CT chest showed bilateral pleural effusions as well as PE.  Patient started on heparin drip.  CCM consulted.    Imaging also indicative of possible ileus.  Patient did require NG tube to suction, but patient did show improvement and had bowel movements shortly after NGT placement.    Patient started on levofloxacin for possible pneumonia.  ID consulted.      Subjective:  Today is:  Hospital Day: 7.  Patient seen and examined in 3TN-3362/3362-01.     Sitting up in bed. Eating okay. Still having BMs. No pain.    Mother at bedside and updated.      Medications:  Reviewed    Infusion Medications    sodium chloride      sodium chloride      sodium chloride Stopped (10/31/24 1407)     Scheduled Medications    potassium phosphate IVPB (PERIPHERAL LINE)  30 mmol IntraVENous Once    gabapentin  100 mg Oral BID    vitamin B-1  100 mg Oral Daily    busPIRone  5 mg Oral TID    folic acid  1 mg Oral Daily    apixaban  10 mg Oral BID    Followed by    [START ON 2024] apixaban  5 mg Oral BID    balsum peru-castor oil   Topical BID    levoFLOXacin  750 mg Oral Daily    zinc sulfate  50 mg Oral Daily    vitamin B-12  50 mcg Oral Daily    sodium chloride flush  5-40 mL IntraVENous 2 times per day    sodium chloride flush  10 mL IntraVENous 2 times per day    escitalopram  20 mg Oral Daily    [Held by provider] valsartan  80 mg Oral Daily     PRN Meds: traZODone, sodium chloride flush, sodium chloride, sodium chloride, LORazepam, potassium  bilateral pleural effusions  -Cultures NTD  -Continue Levaquin; will finish course on 11/8  -Hold off on IVF; patient may need Lasix as he is showing signs of edema  -Daily labs; replace electrolytes as needed  -Sepsis order set ordered   -ID consulted    Acute hypoxic respiratory failure - improving  Possible PE  -CT PE positive for possible PE left upper lobe pulmonary artery  -Discontinue heparin drip; convert to Eliquis today  -Echo 11/1/2024 shows LVEF 60-65%  -Antibiotics as above  -Wean oxygenation supplementation as able; keep O2 sat above 90%  -Daily labs; replace electrolytes as needed    Ileus - resolved  -Advance diet as tolerated  -Monitor BMs    Hypokalemia  -Aggressively replace K  -Will hold off lasix given hypokalemia  -Repeat labs    Hypothyroid  -TSH 12.3 on 10/31; T4 of 1.1  -May need Synthroid, but this can be done outpatient    History of alcohol and substance abuse  -Of note his urine tox screen was positive for cocaine at his UC stay.  -At this point he would be through any type of withdrawal that he may have had to go through so we will not be starting him on any CIWA scale        Discussed management of the case with who ID recommended antibiotics    Controlled substances: Will Continue PRN Ativan for Anxiety    Drugs that require monitoring for toxicity include: Eliquis and the method of monitoring was/is CBC for hgb abnormality    DVT ppx: Oral anticoagulation - Eliquis  GI ppx: Diet/Tube Feeds  Diet: ADULT ORAL NUTRITION SUPPLEMENT; Breakfast, Lunch, Dinner; Standard High Calorie/High Protein Oral Supplement  ADULT DIET; Regular  Code Status: Full Code    PT/OT Eval Status: Ordered    Disposition:  Continue Eliquis.  May need Lasix.  Continue Levaquin.  Awaiting ARU pre-CERT. Is stable for discharge to ARU when able.        Tom Rodríguez, DO  11/6/2024  1:04 PM     Sotyktu Pregnancy And Lactation Text: There is insufficient data to evaluate whether or not Sotyktu is safe to use during pregnancy.   It is not known if Sotyktu passes into breast milk and whether or not it is safe to use when breastfeeding.

## 2024-11-06 NOTE — PROGRESS NOTES
[facility] - Inpatient Rehabilitation Department   Phone: [phone]    Physical Therapy    [] Initial Evaluation            [x] Daily Treatment Note         [] Discharge Summary      Patient: Jm Snow   : 1995   MRN: [id number]   Date of Service:  2024  Admitting Diagnosis: Acute respiratory failure with hypoxia  Current Admission Summary: Debility due to septic shock  History of Present Illness/Hospital Course:  Jm Snow is a 28 y.o. male with PMHx notable for EtOH use disorder, anxiety and HTN who [place] [facility] [place]  with sepsis on , found to have fungenemia of unclear source, possibly secondary to bilateral hip AVN with septic arthritis (however, left hip aspirate cultures with no growth).  He completed 7 days of empiric antibiotics.  Hospital course was complicated by severe TONYA acute renal failure due to ATN, requiring hemodialysis with now recovered renal function.  He additionally developed acute hypoxic respiratory failure secondary to ARDS and aspiration pneumonia requiring intubation on 10/12.  He was transferred to [facility] MICU on 10/12 for further management.  Extubated on 10/18. Hospital course was further complicated by encephalopathy with agitation and psychosis (episodes of hallucination), suspected multifactorial due to metabolic derangements, active infection and possible alcohol withdrawal, as well as ileus.    Hospital Course: Patient was admitted from ARU 10/31 due to fever and recently having been admitted to the ARU after a 5 week hospital stay. He was febrile and tachycardic and did not look good. He went to   and there was a rapid response right about 7 am.  Patient was dropping oxygen sats and having increased 02 requirement.  He was transferred to [address] [place] and placed on 15 L of high flow oxygen. At about 830 there was a second rapid response called this time on [address] [place] due to the patient not maintaining his oxygen saturation despite the 15  Exercises - Dual UE reaching while holding ball - 2x10  Comments: Pt able to support himself in sitting with CGA from OT. Pt reaches for PT hands as target. Target varied to challenge multidirectional anticipatory balance outside of GURVINDER.  - 2 Handed Ball Toss - 2x10 reps; first round with back support, second round without back support   Comments: Pt cued to make tossing motion as large as possible, encouraging full elbow extension.  - Push/Pull - x10   Comments: OT provides resistance to simulate chest press and scapular rowing motions.    Functional Outcomes  AM-PAC Inpatient Mobility Raw Score : 7              Cognition  Overall Cognitive Status: WFL  Following Commands: follows multi step commands with repetition, follows multi step commands with increased time  Safety Judgement: decreased awareness of need for assistance  Problem Solving: assistance required to generate solutions, assistance required to implement solutions  Insights: decreased awareness of deficits  Initiation: requires cues for all  Sequencing: requires cues for all  Comments: Pt has decrease awareness of deficits with increased time spent explaining proper steps prior to walking, standing, wheelchair mobility. Pt able to verbalize understanding. Pt anxious with movement and require increase time to explain process and benefits   Orientation:    alert and oriented x 4  Command Following:   WFL    Education  Barriers To Learning: physical  Patient Education: patient educated on goals, PT role and benefits, plan of care, general safety, functional mobility training, proper use of assistive device/equipment, family education, pressure relief, injury prevention, transfer training, discharge recommendations  Learning Assessment:  patient verbalizes understanding, would benefit from continued reinforcement    Assessment  Activity Tolerance: Poor, limited d/t generalized weakness and anxiety   Impairments Requiring Therapeutic Intervention: decreased

## 2024-11-06 NOTE — PROGRESS NOTES
10/25/2024    Constipation 10/27/2024    ETOH abuse 10/19/2024    Fungemia 10/19/2024    Ileus (HCC) 10/27/2024    Prolonged Q-T interval on ECG 10/25/2024    Septic shock (HCC) 10/19/2024    Secondary to Fungemia       Past Surgical History: All past surgical history was reviewed today.    History reviewed. No pertinent surgical history.    Family History: All family history was reviewed today.    No family history on file.    Objective:       PHYSICAL EXAM:      Vitals:   Vitals:    11/06/24 0500 11/06/24 0600 11/06/24 0923 11/06/24 1149   BP: 135/87  120/81 115/72   Pulse: (!) 108  (!) 104 (!) 114   Resp: 18  18 18   Temp: 98.3 °F (36.8 °C)  98.6 °F (37 °C) 98.4 °F (36.9 °C)   TempSrc: Temporal  Oral Oral   SpO2: 98%  98% 95%   Weight:  113.4 kg (250 lb)     Height:           Physical Exam  Vitals and nursing note reviewed.   Constitutional:       Appearance: He is well-developed. He is not diaphoretic.      Comments: The patient was seen earlier today.   HENT:      Head: Normocephalic and atraumatic.      Right Ear: External ear normal. There is no impacted cerumen.      Left Ear: External ear normal. There is no impacted cerumen.      Nose: Nose normal.      Mouth/Throat:      Mouth: Mucous membranes are moist.      Pharynx: Oropharynx is clear. No oropharyngeal exudate.   Eyes:      General: No scleral icterus.        Right eye: No discharge.         Left eye: No discharge.      Conjunctiva/sclera: Conjunctivae normal.      Pupils: Pupils are equal, round, and reactive to light.   Neck:      Thyroid: No thyromegaly.   Cardiovascular:      Rate and Rhythm: Normal rate and regular rhythm.      Heart sounds: Normal heart sounds. No murmur heard.     No friction rub.   Pulmonary:      Effort: No respiratory distress.      Breath sounds: No stridor. No wheezing or rales.   Abdominal:      General: Bowel sounds are normal.      Palpations: Abdomen is soft.      Tenderness: There is no abdominal tenderness. There is  interpreted the findings and results today.    XR ABDOMEN (KUB) (SINGLE AP VIEW)   Final Result   No significant interval change.         Vascular duplex lower extremity venous bilateral   Final Result      XR ABDOMEN FOR NG/OG/NE TUBE PLACEMENT   Final Result   NG tube is seen in the upper abdomen, tip projecting superiorly, in the   region of the gastric fundus.      Hazy opacity at the lung bases, right greater than left, either atelectasis   or pneumonia.      RECOMMENDATION:         CT ABDOMEN PELVIS W IV CONTRAST Additional Contrast? None   Final Result   Chest:      Poor opacification anterior branch left upper lobe pulmonary artery, not   clearly artifactual, suggestive of small left upper lobe pulmonary embolus.      Patchy nodularity right upper lobe and right middle lobe suspicious for   pneumonia      Bilateral pleural effusions with adjacent consolidation of the lung bases.   Basilar lung consolidation is either due to atelectasis or pneumonia      Sclerosis of the humeral heads bilaterally suggesting avascular necrosis.      Abdomen and pelvis      Scattered fluid-filled loops of small and large bowel are seen.  No definite   transition point to suggest obstruction.  There is mild abdominal and pelvic   ascites seen, which may relate to fluid overload given the body wall anasarca.      There is suspected avascular necrosis of the femoral heads bilaterally and a   subchondral fracture on the left.      RECOMMENDATIONS:   Results discussed with Dr Steen by Sanjay Qiu MD at 8:03 am on   10/31/2024         CT CHEST PULMONARY EMBOLISM W CONTRAST   Final Result   Chest:      Poor opacification anterior branch left upper lobe pulmonary artery, not   clearly artifactual, suggestive of small left upper lobe pulmonary embolus.      Patchy nodularity right upper lobe and right middle lobe suspicious for   pneumonia      Bilateral pleural effusions with adjacent consolidation of the lung bases.   Basilar  History of alcoholism (HCC) F10.21    Avascular necrosis of bone of hip M87.059    Multifocal pneumonia J18.9    Acute respiratory failure with hypoxia J96.01    Ileus (HCC) K56.7    Alcohol cessation counseling Z71.41    Drug abuse counseling and surveillance of drug abuser Z71.51    Pulmonary embolism (HCC) I26.99       Please note that this chart was generated using Dragon dictation software. Although every effort was made to ensure the accuracy of this automated transcription, some errors in transcription may have occurred inadvertently. If you may need any clarification, please do not hesitate to contact me through EPIC or at the phone number provided below with my electronic signature.  Any pictures or media included in this note were obtained after taking informed verbal consent from the patient and with their approval to include those in the patient's medical record.        Cj Carbajal MD, MPH, FACP, [facility]  11/6/2024, 2:19 PM  Central Office Phone: [phone]  Central Office Fax: [phone]    [facility]   [address], [address] ([address])  [place] [address]  [facility] days:  Tuesday & Thursday AM    [facility]  [address] , [address] (Medical office Building)  [place], [place], [address]  [facility] days: Wednesday AM

## 2024-11-06 NOTE — CONSULTS
Jm Snow  11/6/2024  [id number]    Chief Complaint: Acute respiratory failure with hypoxia    Subjective   Since last seen, medical updates:  - Hypomagnesemia, hypokalemia being corrected today. Lasix being held.     Patient says that he is not feeling very well, in a lot of pain today.  He is having near constant aching/burning pain in his bilateral lower extremities below the knee, worst in the feet.  His pain is worse overnight, kept him from sleeping last night.  Denies any other new concerns today. Tolerating his diet. Last BM 11/6.          Objective   Objective:  Patient Vitals for the past 24 hrs:   BP Temp Temp src Pulse Resp SpO2 Weight   11/06/24 1149 115/72 98.4 °F (36.9 °C) Oral (!) 114 18 95 % --   11/06/24 0923 120/81 98.6 °F (37 °C) Oral (!) 104 18 98 % --   11/06/24 0600 -- -- -- -- -- -- 113.4 kg (250 lb)   11/06/24 0500 135/87 98.3 °F (36.8 °C) Temporal (!) 108 18 98 % --   11/05/24 2315 (!) 150/67 98.2 °F (36.8 °C) Temporal (!) 107 18 98 % --     Gen: No distress, pleasant.   HEENT: Normocephalic, atraumatic.   CV: Extremities warm, perfused.  Resp: No respiratory distress. No increased WOB  Abd: Soft, nontender nondistended  Ext: No edema.   Neuro: Alert. Similar diffuse weakness (proximal > distal). Diminished sensation to light touch bilateral lower extremities distally.   Psych: Calm, pleasant. Affect congruent with stated mood.     Laboratory data: Available via EMR.     Therapy progress:    PT    Rolling: Level of difficulty - A Lot   Sit to Stand from a Chair: Level of difficulty - Total  Supine to Sit: Level of difficulty - Total     Bed to Chair: Physical Assistance Required - Total  Ambulate Across Room: Physical Assistance Required - Total  Ascend 3-5 Steps With HR: Physical Assistance Required - Total    OT    Eating: Physical Assistance Required - A Little  Grooming: Physical Assistance Required - A Lot  LB Dressing: Physical Assistance Required - Total  UB Dressing: Physical  Assistance Required - A Lot  Bathing: Physical Assistance Required - Total  Toileting: Physical Assistance Required - Total    SLP         Body mass index is 30.43 kg/m².       Assessment:  Critical illness myopathy/polyneuropathy in the setting of septic shock requiring vasopressor support, with acute respiratory failure (due to ARDS, aspiration PNA) requiring mechanical ventilation and acute renal failure requiring intermittent hemodialysis  Multifocal pneumonia  Pulmonary embolism  Ileus  Hypothyroidism  Bilateral hip AVN  EtOH use disorder, substance abuse  HFrEF  HTN    Impairments: Diffuse weakness (proximal > distal), decreased functional endurance, sensory loss, dysphagia, cognitive deficits, limiting independence with functional mobility and ADLs.     Plan:   - Recommend starting gabapentin 100 mg 3 times daily for lower extremity neuropathic pain complaints which are limiting mobility and interfering with sleep overnight.  - Remains medically and functionally appropriate for inpatient rehab, pending insurance approval.    Chinedu Rosas MD 11/6/2024, 4:00 PM    * This document was created using dictation software.  While all precautions were taken to ensure accuracy, errors may have occurred.  Please disregard any typographical errors.

## 2024-11-07 ENCOUNTER — HOSPITAL ENCOUNTER (INPATIENT)
Age: 29
LOS: 15 days | Discharge: HOME HEALTH | DRG: 091 | End: 2024-11-22
Attending: STUDENT IN AN ORGANIZED HEALTH CARE EDUCATION/TRAINING PROGRAM | Admitting: STUDENT IN AN ORGANIZED HEALTH CARE EDUCATION/TRAINING PROGRAM
Payer: COMMERCIAL

## 2024-11-07 VITALS
WEIGHT: 250 LBS | BODY MASS INDEX: 30.44 KG/M2 | RESPIRATION RATE: 20 BRPM | SYSTOLIC BLOOD PRESSURE: 128 MMHG | DIASTOLIC BLOOD PRESSURE: 77 MMHG | OXYGEN SATURATION: 97 % | HEART RATE: 112 BPM | HEIGHT: 76 IN | TEMPERATURE: 98 F

## 2024-11-07 PROBLEM — G72.81 CRITICAL ILLNESS MYOPATHY: Status: ACTIVE | Noted: 2024-11-07

## 2024-11-07 LAB
ALBUMIN SERPL-MCNC: 2.7 G/DL (ref 3.4–5)
ANION GAP SERPL CALCULATED.3IONS-SCNC: 12 MMOL/L (ref 3–16)
ANION GAP SERPL CALCULATED.3IONS-SCNC: 12 MMOL/L (ref 3–16)
ANISOCYTOSIS BLD QL SMEAR: ABNORMAL
BASOPHILS # BLD: 0 K/UL (ref 0–0.2)
BASOPHILS NFR BLD: 1 %
BUN SERPL-MCNC: 3 MG/DL (ref 7–20)
BUN SERPL-MCNC: 3 MG/DL (ref 7–20)
CALCIUM SERPL-MCNC: 7 MG/DL (ref 8.3–10.6)
CALCIUM SERPL-MCNC: 7 MG/DL (ref 8.3–10.6)
CHLORIDE SERPL-SCNC: 109 MMOL/L (ref 99–110)
CHLORIDE SERPL-SCNC: 109 MMOL/L (ref 99–110)
CO2 SERPL-SCNC: 18 MMOL/L (ref 21–32)
CO2 SERPL-SCNC: 19 MMOL/L (ref 21–32)
CREAT SERPL-MCNC: 0.5 MG/DL (ref 0.9–1.3)
CREAT SERPL-MCNC: 0.5 MG/DL (ref 0.9–1.3)
DEPRECATED RDW RBC AUTO: 15.8 % (ref 12.4–15.4)
EOSINOPHIL # BLD: 0.3 K/UL (ref 0–0.6)
EOSINOPHIL NFR BLD: 9 %
GFR SERPLBLD CREATININE-BSD FMLA CKD-EPI: >90 ML/MIN/{1.73_M2}
GFR SERPLBLD CREATININE-BSD FMLA CKD-EPI: >90 ML/MIN/{1.73_M2}
GLUCOSE SERPL-MCNC: 101 MG/DL (ref 70–99)
GLUCOSE SERPL-MCNC: 85 MG/DL (ref 70–99)
HCT VFR BLD AUTO: 25 % (ref 40.5–52.5)
HGB BLD-MCNC: 8.2 G/DL (ref 13.5–17.5)
LYMPHOCYTES # BLD: 0.4 K/UL (ref 1–5.1)
LYMPHOCYTES NFR BLD: 14 %
MAGNESIUM SERPL-MCNC: 1.87 MG/DL (ref 1.8–2.4)
MCH RBC QN AUTO: 29.9 PG (ref 26–34)
MCHC RBC AUTO-ENTMCNC: 32.9 G/DL (ref 31–36)
MCV RBC AUTO: 90.9 FL (ref 80–100)
METAMYELOCYTES NFR BLD MANUAL: 3 %
MONOCYTES # BLD: 0.5 K/UL (ref 0–1.3)
MONOCYTES NFR BLD: 16 %
NEUTROPHILS # BLD: 1.8 K/UL (ref 1.7–7.7)
NEUTROPHILS NFR BLD: 53 %
NEUTS BAND NFR BLD MANUAL: 4 % (ref 0–7)
PATH INTERP BLD-IMP: NO
PHOSPHATE SERPL-MCNC: 2.8 MG/DL (ref 2.5–4.9)
PLATELET # BLD AUTO: 179 K/UL (ref 135–450)
PLATELET BLD QL SMEAR: ADEQUATE
PMV BLD AUTO: 8 FL (ref 5–10.5)
POTASSIUM SERPL-SCNC: 3 MMOL/L (ref 3.5–5.1)
POTASSIUM SERPL-SCNC: 3.6 MMOL/L (ref 3.5–5.1)
RBC # BLD AUTO: 2.75 M/UL (ref 4.2–5.9)
SLIDE REVIEW: ABNORMAL
SODIUM SERPL-SCNC: 139 MMOL/L (ref 136–145)
SODIUM SERPL-SCNC: 140 MMOL/L (ref 136–145)
WBC # BLD AUTO: 3 K/UL (ref 4–11)

## 2024-11-07 PROCEDURE — 2580000003 HC RX 258: Performed by: INTERNAL MEDICINE

## 2024-11-07 PROCEDURE — 6370000000 HC RX 637 (ALT 250 FOR IP): Performed by: INTERNAL MEDICINE

## 2024-11-07 PROCEDURE — 94669 MECHANICAL CHEST WALL OSCILL: CPT

## 2024-11-07 PROCEDURE — 6360000002 HC RX W HCPCS: Performed by: STUDENT IN AN ORGANIZED HEALTH CARE EDUCATION/TRAINING PROGRAM

## 2024-11-07 PROCEDURE — 97535 SELF CARE MNGMENT TRAINING: CPT

## 2024-11-07 PROCEDURE — 97140 MANUAL THERAPY 1/> REGIONS: CPT

## 2024-11-07 PROCEDURE — 36415 COLL VENOUS BLD VENIPUNCTURE: CPT

## 2024-11-07 PROCEDURE — 85025 COMPLETE CBC W/AUTO DIFF WBC: CPT

## 2024-11-07 PROCEDURE — 97530 THERAPEUTIC ACTIVITIES: CPT

## 2024-11-07 PROCEDURE — 94761 N-INVAS EAR/PLS OXIMETRY MLT: CPT

## 2024-11-07 PROCEDURE — G0378 HOSPITAL OBSERVATION PER HR: HCPCS

## 2024-11-07 PROCEDURE — 96366 THER/PROPH/DIAG IV INF ADDON: CPT

## 2024-11-07 PROCEDURE — 83735 ASSAY OF MAGNESIUM: CPT

## 2024-11-07 PROCEDURE — 51798 US URINE CAPACITY MEASURE: CPT

## 2024-11-07 PROCEDURE — 6370000000 HC RX 637 (ALT 250 FOR IP): Performed by: STUDENT IN AN ORGANIZED HEALTH CARE EDUCATION/TRAINING PROGRAM

## 2024-11-07 PROCEDURE — 94760 N-INVAS EAR/PLS OXIMETRY 1: CPT

## 2024-11-07 PROCEDURE — 80069 RENAL FUNCTION PANEL: CPT

## 2024-11-07 PROCEDURE — 1280000000 HC REHAB R&B

## 2024-11-07 RX ORDER — TRAZODONE HYDROCHLORIDE 50 MG/1
50 TABLET, FILM COATED ORAL NIGHTLY PRN
Status: CANCELLED | OUTPATIENT
Start: 2024-11-07

## 2024-11-07 RX ORDER — ONDANSETRON 4 MG/1
4 TABLET, ORALLY DISINTEGRATING ORAL EVERY 8 HOURS PRN
Status: CANCELLED | OUTPATIENT
Start: 2024-11-07

## 2024-11-07 RX ORDER — ESCITALOPRAM OXALATE 10 MG/1
20 TABLET ORAL DAILY
Status: DISCONTINUED | OUTPATIENT
Start: 2024-11-08 | End: 2024-11-09

## 2024-11-07 RX ORDER — VALSARTAN 80 MG/1
80 TABLET ORAL DAILY
Status: DISCONTINUED | OUTPATIENT
Start: 2024-11-08 | End: 2024-11-19

## 2024-11-07 RX ORDER — ACETAMINOPHEN 325 MG/1
650 TABLET ORAL EVERY 4 HOURS PRN
Status: DISCONTINUED | OUTPATIENT
Start: 2024-11-07 | End: 2024-11-08

## 2024-11-07 RX ORDER — LEVOFLOXACIN 500 MG/1
750 TABLET, FILM COATED ORAL DAILY
Status: DISCONTINUED | OUTPATIENT
Start: 2024-11-08 | End: 2024-11-08

## 2024-11-07 RX ORDER — SENNOSIDES A AND B 8.6 MG/1
2 TABLET, FILM COATED ORAL NIGHTLY PRN
Status: DISCONTINUED | OUTPATIENT
Start: 2024-11-07 | End: 2024-11-22 | Stop reason: HOSPADM

## 2024-11-07 RX ORDER — LEVOFLOXACIN 750 MG/1
750 TABLET, FILM COATED ORAL DAILY
Qty: 1 TABLET | Refills: 0 | Status: ON HOLD | OUTPATIENT
Start: 2024-11-08 | End: 2024-11-22 | Stop reason: HOSPADM

## 2024-11-07 RX ORDER — BUSPIRONE HYDROCHLORIDE 5 MG/1
5 TABLET ORAL 3 TIMES DAILY
Status: DISCONTINUED | OUTPATIENT
Start: 2024-11-07 | End: 2024-11-09

## 2024-11-07 RX ORDER — FOLIC ACID 1 MG/1
1 TABLET ORAL DAILY
Status: DISCONTINUED | OUTPATIENT
Start: 2024-11-08 | End: 2024-11-22 | Stop reason: HOSPADM

## 2024-11-07 RX ORDER — GABAPENTIN 100 MG/1
100 CAPSULE ORAL 2 TIMES DAILY
Status: DISCONTINUED | OUTPATIENT
Start: 2024-11-07 | End: 2024-11-08

## 2024-11-07 RX ORDER — BUSPIRONE HYDROCHLORIDE 5 MG/1
5 TABLET ORAL 3 TIMES DAILY
Status: CANCELLED | OUTPATIENT
Start: 2024-11-07

## 2024-11-07 RX ORDER — CASTOR OIL AND BALSAM, PERU 788; 87 MG/G; MG/G
OINTMENT TOPICAL 2 TIMES DAILY
Status: CANCELLED | OUTPATIENT
Start: 2024-11-07

## 2024-11-07 RX ORDER — VALSARTAN 40 MG/1
80 TABLET ORAL DAILY
Status: CANCELLED | OUTPATIENT
Start: 2024-11-08

## 2024-11-07 RX ORDER — POLYETHYLENE GLYCOL 3350 17 G/17G
17 POWDER, FOR SOLUTION ORAL DAILY PRN
Status: CANCELLED | OUTPATIENT
Start: 2024-11-07

## 2024-11-07 RX ORDER — GABAPENTIN 100 MG/1
100 CAPSULE ORAL 2 TIMES DAILY
Qty: 60 CAPSULE | Refills: 0 | Status: ON HOLD | OUTPATIENT
Start: 2024-11-07 | End: 2024-11-22 | Stop reason: HOSPADM

## 2024-11-07 RX ORDER — VALSARTAN 80 MG/1
80 TABLET ORAL DAILY
COMMUNITY

## 2024-11-07 RX ORDER — ACETAMINOPHEN 325 MG/1
650 TABLET ORAL EVERY 4 HOURS PRN
Status: CANCELLED | OUTPATIENT
Start: 2024-11-07

## 2024-11-07 RX ORDER — ONDANSETRON 4 MG/1
4 TABLET, ORALLY DISINTEGRATING ORAL EVERY 8 HOURS PRN
Status: DISCONTINUED | OUTPATIENT
Start: 2024-11-07 | End: 2024-11-22 | Stop reason: HOSPADM

## 2024-11-07 RX ORDER — POTASSIUM CHLORIDE 1500 MG/1
20 TABLET, EXTENDED RELEASE ORAL ONCE
Status: COMPLETED | OUTPATIENT
Start: 2024-11-07 | End: 2024-11-07

## 2024-11-07 RX ORDER — MAGNESIUM SULFATE IN WATER 40 MG/ML
4000 INJECTION, SOLUTION INTRAVENOUS ONCE
Status: COMPLETED | OUTPATIENT
Start: 2024-11-07 | End: 2024-11-07

## 2024-11-07 RX ORDER — POLYETHYLENE GLYCOL 3350 17 G/17G
17 POWDER, FOR SOLUTION ORAL DAILY PRN
Status: DISCONTINUED | OUTPATIENT
Start: 2024-11-07 | End: 2024-11-08

## 2024-11-07 RX ORDER — UBIDECARENONE 75 MG
50 CAPSULE ORAL DAILY
Status: DISCONTINUED | OUTPATIENT
Start: 2024-11-08 | End: 2024-11-22 | Stop reason: HOSPADM

## 2024-11-07 RX ORDER — UBIDECARENONE 75 MG
50 CAPSULE ORAL DAILY
Status: CANCELLED | OUTPATIENT
Start: 2024-11-08

## 2024-11-07 RX ORDER — ESCITALOPRAM OXALATE 10 MG/1
20 TABLET ORAL DAILY
Status: CANCELLED | OUTPATIENT
Start: 2024-11-08

## 2024-11-07 RX ORDER — GABAPENTIN 100 MG/1
100 CAPSULE ORAL 2 TIMES DAILY
Status: CANCELLED | OUTPATIENT
Start: 2024-11-07

## 2024-11-07 RX ORDER — FOLIC ACID 1 MG/1
1 TABLET ORAL DAILY
Status: CANCELLED | OUTPATIENT
Start: 2024-11-08

## 2024-11-07 RX ORDER — SENNOSIDES A AND B 8.6 MG/1
2 TABLET, FILM COATED ORAL NIGHTLY PRN
Status: CANCELLED | OUTPATIENT
Start: 2024-11-07

## 2024-11-07 RX ORDER — GAUZE BANDAGE 2" X 2"
100 BANDAGE TOPICAL DAILY
Status: CANCELLED | OUTPATIENT
Start: 2024-11-08

## 2024-11-07 RX ORDER — ZINC SULFATE 50(220)MG
50 CAPSULE ORAL DAILY
Status: CANCELLED | OUTPATIENT
Start: 2024-11-08

## 2024-11-07 RX ORDER — GAUZE BANDAGE 2" X 2"
100 BANDAGE TOPICAL DAILY
Status: DISCONTINUED | OUTPATIENT
Start: 2024-11-08 | End: 2024-11-22 | Stop reason: HOSPADM

## 2024-11-07 RX ORDER — TRAZODONE HYDROCHLORIDE 50 MG/1
50 TABLET, FILM COATED ORAL NIGHTLY PRN
Status: DISCONTINUED | OUTPATIENT
Start: 2024-11-07 | End: 2024-11-22 | Stop reason: HOSPADM

## 2024-11-07 RX ORDER — CASTOR OIL AND BALSAM, PERU 788; 87 MG/G; MG/G
OINTMENT TOPICAL 2 TIMES DAILY
Status: DISCONTINUED | OUTPATIENT
Start: 2024-11-08 | End: 2024-11-22 | Stop reason: HOSPADM

## 2024-11-07 RX ORDER — ZINC SULFATE 50(220)MG
50 CAPSULE ORAL DAILY
Status: DISCONTINUED | OUTPATIENT
Start: 2024-11-08 | End: 2024-11-18

## 2024-11-07 RX ADMIN — GABAPENTIN 100 MG: 100 CAPSULE ORAL at 21:00

## 2024-11-07 RX ADMIN — POTASSIUM BICARBONATE 50 MEQ: 978 TABLET, EFFERVESCENT ORAL at 09:11

## 2024-11-07 RX ADMIN — Medication 50 MG: at 09:13

## 2024-11-07 RX ADMIN — BUSPIRONE HYDROCHLORIDE 5 MG: 5 TABLET ORAL at 09:12

## 2024-11-07 RX ADMIN — BUSPIRONE HYDROCHLORIDE 5 MG: 5 TABLET ORAL at 21:01

## 2024-11-07 RX ADMIN — Medication 10 ML: at 09:50

## 2024-11-07 RX ADMIN — VITAM B12 50 MCG: 100 TAB at 09:12

## 2024-11-07 RX ADMIN — BUSPIRONE HYDROCHLORIDE 5 MG: 5 TABLET ORAL at 13:15

## 2024-11-07 RX ADMIN — APIXABAN 5 MG: 5 TABLET, FILM COATED ORAL at 09:13

## 2024-11-07 RX ADMIN — MAGNESIUM SULFATE HEPTAHYDRATE 4000 MG: 4 INJECTION, SOLUTION INTRAVENOUS at 09:29

## 2024-11-07 RX ADMIN — POTASSIUM CHLORIDE 20 MEQ: 1500 TABLET, EXTENDED RELEASE ORAL at 09:13

## 2024-11-07 RX ADMIN — Medication 100 MG: at 09:13

## 2024-11-07 RX ADMIN — GABAPENTIN 100 MG: 100 CAPSULE ORAL at 09:13

## 2024-11-07 RX ADMIN — ACETAMINOPHEN 650 MG: 325 TABLET ORAL at 13:15

## 2024-11-07 RX ADMIN — TRAZODONE HYDROCHLORIDE 50 MG: 50 TABLET ORAL at 21:01

## 2024-11-07 RX ADMIN — LEVOFLOXACIN 750 MG: 500 TABLET, FILM COATED ORAL at 09:11

## 2024-11-07 RX ADMIN — FOLIC ACID 1 MG: 1 TABLET ORAL at 09:13

## 2024-11-07 RX ADMIN — ESCITALOPRAM OXALATE 20 MG: 10 TABLET ORAL at 09:13

## 2024-11-07 RX ADMIN — Medication: at 09:13

## 2024-11-07 RX ADMIN — POTASSIUM BICARBONATE 50 MEQ: 978 TABLET, EFFERVESCENT ORAL at 21:00

## 2024-11-07 RX ADMIN — APIXABAN 5 MG: 5 TABLET, FILM COATED ORAL at 21:01

## 2024-11-07 ASSESSMENT — PAIN SCALES - GENERAL: PAINLEVEL_OUTOF10: 4

## 2024-11-07 NOTE — PROGRESS NOTES
[facility] - Inpatient Rehabilitation Department   Phone: [phone]    Physical Therapy    [] Initial Evaluation            [x] Daily Treatment Note         [] Discharge Summary      Patient: Jm Snow   : 1995   MRN: [id number]   Date of Service:  2024  Admitting Diagnosis: Acute respiratory failure with hypoxia  Current Admission Summary: Debility due to septic shock  History of Present Illness/Hospital Course:  Jm Snow is a 28 y.o. male with PMHx notable for EtOH use disorder, anxiety and HTN who presented to [facility] on  with sepsis on , found to have fungenemia of unclear source, possibly secondary to bilateral hip AVN with septic arthritis (however, left hip aspirate cultures with no growth).  He completed 7 days of empiric antibiotics.  Hospital course was complicated by severe TONYA acute renal failure due to ATN, requiring hemodialysis with now recovered renal function.  He additionally developed acute hypoxic respiratory failure secondary to ARDS and aspiration pneumonia requiring intubation on 10/12.  He was transferred to [facility] MICU on 10/12 for further management.  Extubated on 10/18. Hospital course was further complicated by encephalopathy with agitation and psychosis (episodes of hallucination), suspected multifactorial due to metabolic derangements, active infection and possible alcohol withdrawal, as well as ileus.    Hospital Course: Patient was admitted from ARU 10/31 due to fever and recently having been admitted to the ARU after a 5 week hospital stay. He was febrile and tachycardic and did not look good. He went to   and there was a rapid response right about 7 am.  Patient was dropping oxygen sats and having increased 02 requirement.  He was transferred to [address] and placed on 15 L of high flow oxygen. At about 830 there was a second rapid response called this time on [address] due to the patient not maintaining his oxygen saturation despite the 15  to [facility] to the ARU but developed a temp of 102 degrees and was tachycardic with progressing SOB.  He was transferred off ARU to medical floor where he was eventually placed on 15L O2 and eventually 60L [place] and transferred to ICU.      Today, he is requiring MAX assist for rolling and is dependent with maxisky transfer from bed to recliner. When using stedy from EOB, Pt requires max A of 2 for bed mobility and transfers. Incorporated stretching into plan of care in order to prime Pt for standing upright without rigidity being a limiting factor. During afternoon session, Pt able to demonstrate 30 sec tolerance to standing in stedy, an improvement from previous treatments. Pt still demonstrating significant weakness and will benefit from continued skilled services. Recommend 24 hour assist and continued therapy at d/c.      Safety Interventions: patient left in chair, chair alarm in place, call light within reach, gait belt, patient at risk for falls, nurse notified, and family/caregiver present    Plan  Frequency: 5-7 x/week  Current Treatment Recommendations: strengthening, ROM, balance training, functional mobility training, transfer training, gait training, endurance training, neuromuscular re-education, wheelchair mobility training, patient/caregiver education, ADL/self-care training, IADL training, home exercise program, safety education, and equipment evaluation/education    Goals  Patient Goals: wheelchair mobility, walk with cane and take a shower    Short Term Goals:  Time Frame: by d/c   Pt will complete rolling with Max A - Met 11/5   Pt will complete supine to sit with Max A    Pt will sit EOB for 3-5 mins with Max A    Pt will complete squat pivot transfer with Max A of 2    Pt will complete full stand with use of JERMAIN STEDY and Max A of 2      Pt will complete rolling with mod A      Above goals reviewed on 11/7/2024.  All goals are ongoing at this time unless indicated

## 2024-11-07 NOTE — DISCHARGE SUMMARY
Hospital Medicine Discharge Summary    Name:  Jm Snow  Gender: male  : 1995  28 y.o.  MRN: [id number]    PCP: No primary care provider on file.     Date of Admission:  10/31/2024  1:08 AM  Discharge Date: 2024    Admitting Physician: Girma Metz DO  Discharge Physician: Tom Rodríguez DO    Communication to PCP  -PE - now on eliquis  -Discharged to ARU      Discharge Diagnoses:       Active Hospital Problems    Diagnosis     Pulmonary embolism (HCC) [I26.99]     Ileus (HCC) [K56.7]     Alcohol cessation counseling [Z71.41]     Drug abuse counseling and surveillance of drug abuser [Z71.51]     Sepsis with acute hypoxic respiratory failure without septic shock (HCC) [A41.9, R65.20, J96.01]     Swelling of lower extremity [M79.89]     Cocaine abuse (HCC) [F14.10]     Overweight (BMI 25.0-29.9) [E66.3]     History of alcoholism (HCC) [F10.21]     Avascular necrosis of bone of hip [M87.059]     Multifocal pneumonia [J18.9]     Acute respiratory failure with hypoxia [J96.01]        The patient was seen and examined on day of discharge and this discharge summary is in conjunction with any daily progress note from day of discharge.    Hospital Course:  Jm Snow is a 28 y.o. year old male who presented to [facility] on 10/31/2024  1:08 AM.      Patient admitted from ARU on 10/31 due to fever.  Patient had a bout of hypoxia requiring intermittent high flow oxygen.  Patient continued to be hypoxic despite supplemental oxygen.  CT chest showed bilateral pleural effusions as well as PE.  Patient started on heparin drip.  CCM consulted.     Imaging also indicative of possible ileus.  Patient did require NG tube to suction, but patient did show improvement and had bowel movements shortly after NGT placement.     Patient started on levofloxacin for possible pneumonia.  ID consulted.    Transitioned to Eliquis and doing well.      On the last day of hospital stay, patient was doing well.    Bilateral pleural effusions with adjacent consolidation of the lung bases.   Basilar lung consolidation is either due to atelectasis or pneumonia      Sclerosis of the humeral heads bilaterally suggesting avascular necrosis.      Abdomen and pelvis      Scattered fluid-filled loops of small and large bowel are seen.  No definite   transition point to suggest obstruction.  There is mild abdominal and pelvic   ascites seen, which may relate to fluid overload given the body wall anasarca.      There is suspected avascular necrosis of the femoral heads bilaterally and a   subchondral fracture on the left.      RECOMMENDATIONS:   Results discussed with Dr Steen by Sanjay Qiu MD at 8:03 am on   10/31/2024                Consults:     IP CONSULT TO GI  IP CONSULT TO INFECTIOUS DISEASES  IP CONSULT TO PODIATRY  IP CONSULT TO PULMONOLOGY  IP CONSULT TO PHYSICAL MEDICINE REHAB  IP CONSULT TO DIETITIAN    F/U APPTS:  [facility]  [address]  [facility] [id number] [phone]    Schedule an appointment as soon as possible for a visit in 1 week(s)  For wound re-check    Breezy Inman MD  [address]  [facility] [address] [phone]    Call in 1 week(s)  for anticoagulation recommendations      Diet:  regular diet     Activity:  activity as tolerated    Disposition:  Discharged to ARU    Rehab: Patient is going to rehab at [facility]    Condition at Discharge: Stable    Code Status:  Full Code     Discharge Medications:     Current Discharge Medication List             Details   gabapentin (NEURONTIN) 100 MG capsule Take 1 capsule by mouth 2 times daily for 30 days.  Qty: 60 capsule, Refills: 0      levoFLOXacin (LEVAQUIN) 750 MG tablet Take 1 tablet by mouth daily for 1 dose  Qty: 1 tablet, Refills: 0      apixaban (ELIQUIS) 5 MG TABS tablet Take 1 tablet by mouth 2 times daily  Qty: 180 tablet, Refills: 0                Details   busPIRone (BUSPAR) 5 MG tablet Take 1 tablet

## 2024-11-07 NOTE — PLAN OF CARE
Problem: Safety - Adult  Goal: Free from fall injury  11/7/2024 1646 by Matthew Buchanan RN  Outcome: Progressing  11/7/2024 0636 by Lisa Beasley RN  Outcome: Progressing     Problem: Skin/Tissue Integrity  Goal: Absence of new skin breakdown  Description: 1.  Monitor for areas of redness and/or skin breakdown  2.  Assess vascular access sites hourly  3.  Every 4-6 hours minimum:  Change oxygen saturation probe site  4.  Every 4-6 hours:  If on nasal continuous positive airway pressure, respiratory therapy assess nares and determine need for appliance change or resting period.  11/7/2024 1646 by Matthew Buchanan, RN  Outcome: Progressing  11/7/2024 0636 by Lisa Beasley, RN  Outcome: Progressing     Problem: Pain  Goal: Verbalizes/displays adequate comfort level or baseline comfort level  11/7/2024 1646 by Matthew Buchanan RN  Outcome: Progressing  11/7/2024 0636 by Lisa Beasley, RN  Outcome: Progressing

## 2024-11-07 NOTE — PROGRESS NOTES
[facility] - Inpatient Rehabilitation Department   Phone: [phone]    Occupational Therapy    [] Initial Evaluation            [x] Daily Treatment Note         [] Discharge Summary      Patient: Jm Snow   : 1995   MRN: [id number]   Date of Service:  2024    Admitting Diagnosis:  Acute respiratory failure with hypoxia  Current Admission Summary: 28 y.o. male with PMHx notable for EtOH use disorder, anxiety and HTN who presented to [facility] on  with sepsis on , found to have fungenemia of unclear source, possibly secondary to bilateral hip AVN with septic arthritis (however, left hip aspirate cultures with no growth).  He completed 7 days of empiric antibiotics.  Hospital course was complicated by severe TONYA acute renal failure due to ATN, requiring hemodialysis with now recovered renal function.  He additionally developed acute hypoxic respiratory failure secondary to ARDS and aspiration pneumonia requiring intubation on 10/12.  He was transferred to [facility] MICU on 10/12 for further management.  Extubated on 10/18. Hospital course was further complicated by encephalopathy with agitation and psychosis (episodes of hallucination), suspected multifactorial due to metabolic derangements, active infection and possible alcohol withdrawal, as well as ileus. Admitted to ARU but required discharge to ICU after multiple rapid responses, tachycardia, with concern for PE     Past Medical History:  has a past medical history of Acute anxiety, Acute HFrEF (heart failure with reduced ejection fraction) ([facility]), TONYA (acute kidney injury) ([facility]), Anasarca, ARDS (adult respiratory distress syndrome), ATN (acute tubular necrosis) ([facility]), Constipation, ETOH abuse, Fungemia, Ileus ([facility]), Prolonged Q-T interval on ECG, and Septic shock ([facility]).  Past Surgical History:  has no past surgical history on file.    Discharge Recommendations: Jm Snow scored a  on the AM-PAC ADL Inpatient form.  functional standing balance to min A for improved ADL completion  Patient will complete bed mobility at maximum assistance       Above goals reviewed on 11/7/2024.  All goals are ongoing at this time unless indicated above.       Therapy Session Time     Individual Group Co-treatment   Time In    1228   Time Out    1321   Minutes    53       Timed Code Treatment Minutes: 53 Minutes  Total Treatment Minutes:  53       Electronically Signed By: Alanna GARCIA OTR/L [id number]

## 2024-11-07 NOTE — PROGRESS NOTES
Hospitalist Progress Note    Name:  Jm Snow    /Age/Sex: 1995  (28 y.o. male)  MRN & CSN:  [id number] & [id number]    PCP: No primary care provider on file.    Date of Admission: 10/31/2024    Patient Status:  Inpatient     Chief Complaint: No chief complaint on file.      Hospital Course:   Patient admitted from ARU on 10/31 due to fever.  Patient had a bout of hypoxia requiring intermittent high flow oxygen.  Patient continued to be hypoxic despite supplemental oxygen.  CT chest showed bilateral pleural effusions as well as PE.  Patient started on heparin drip.  CCM consulted.    Imaging also indicative of possible ileus.  Patient did require NG tube to suction, but patient did show improvement and had bowel movements shortly after NGT placement.    Patient started on levofloxacin for possible pneumonia.  ID consulted.      Subjective:  Today is:  Hospital Day: 8.  Patient seen and examined in [id number]/3362-01.     Lying in bed. No pain today. Eating well.      Medications:  Reviewed    Infusion Medications    sodium chloride      sodium chloride      sodium chloride Stopped (10/31/24 1407)     Scheduled Medications    magnesium sulfate  4,000 mg IntraVENous Once    potassium chloride  20 mEq Oral Once    potassium bicarbonate  50 mEq Oral BID    gabapentin  100 mg Oral BID    vitamin B-1  100 mg Oral Daily    busPIRone  5 mg Oral TID    folic acid  1 mg Oral Daily    apixaban  5 mg Oral BID    balsum peru-castor oil   Topical BID    levoFLOXacin  750 mg Oral Daily    zinc sulfate  50 mg Oral Daily    vitamin B-12  50 mcg Oral Daily    sodium chloride flush  5-40 mL IntraVENous 2 times per day    sodium chloride flush  10 mL IntraVENous 2 times per day    escitalopram  20 mg Oral Daily    [Held by provider] valsartan  80 mg Oral Daily     PRN Meds: traZODone, sodium chloride flush, sodium chloride, sodium chloride, LORazepam, potassium chloride **OR** potassium alternative oral replacement    Results discussed with Dr Steen by Sanjay Qiu MD at 8:03 am on   10/31/2024         CT CHEST PULMONARY EMBOLISM W CONTRAST   Final Result   Chest:      Poor opacification anterior branch left upper lobe pulmonary artery, not   clearly artifactual, suggestive of small left upper lobe pulmonary embolus.      Patchy nodularity right upper lobe and right middle lobe suspicious for   pneumonia      Bilateral pleural effusions with adjacent consolidation of the lung bases.   Basilar lung consolidation is either due to atelectasis or pneumonia      Sclerosis of the humeral heads bilaterally suggesting avascular necrosis.      Abdomen and pelvis      Scattered fluid-filled loops of small and large bowel are seen.  No definite   transition point to suggest obstruction.  There is mild abdominal and pelvic   ascites seen, which may relate to fluid overload given the body wall anasarca.      There is suspected avascular necrosis of the femoral heads bilaterally and a   subchondral fracture on the left.      RECOMMENDATIONS:   Results discussed with Dr Steen by Sanjay Qiu MD at 8:03 am on   10/31/2024                 Assessment/Plan:    Active Hospital Problems    Diagnosis     Pulmonary embolism (HCC) [I26.99]     Ileus (HCC) [K56.7]     Alcohol cessation counseling [Z71.41]     Drug abuse counseling and surveillance of drug abuser [Z71.51]     Sepsis with acute hypoxic respiratory failure without septic shock (HCC) [A41.9, R65.20, J96.01]     Swelling of lower extremity [M79.89]     Cocaine abuse (HCC) [F14.10]     Overweight (BMI 25.0-29.9) [E66.3]     History of alcoholism (HCC) [F10.21]     Avascular necrosis of bone of hip [M87.059]     Multifocal pneumonia [J18.9]     Acute respiratory failure with hypoxia [J96.01]          Hospital Day: 8    This is a 28 y.o. male who presented to [facility] on 10/31/2024 and is being treated for:    Sepsis - improving  Pneumonia - improving  -CT chest shows patchy

## 2024-11-08 PROBLEM — E44.1 MILD MALNUTRITION (HCC): Chronic | Status: ACTIVE | Noted: 2024-11-08

## 2024-11-08 LAB
ANION GAP SERPL CALCULATED.3IONS-SCNC: 10 MMOL/L (ref 3–16)
BUN SERPL-MCNC: 3 MG/DL (ref 7–20)
CALCIUM SERPL-MCNC: 7.2 MG/DL (ref 8.3–10.6)
CHLORIDE SERPL-SCNC: 109 MMOL/L (ref 99–110)
CO2 SERPL-SCNC: 20 MMOL/L (ref 21–32)
CREAT SERPL-MCNC: 0.4 MG/DL (ref 0.9–1.3)
GFR SERPLBLD CREATININE-BSD FMLA CKD-EPI: >90 ML/MIN/{1.73_M2}
GLUCOSE SERPL-MCNC: 85 MG/DL (ref 70–99)
MAGNESIUM SERPL-MCNC: 1.53 MG/DL (ref 1.8–2.4)
POTASSIUM SERPL-SCNC: 3.3 MMOL/L (ref 3.5–5.1)
SODIUM SERPL-SCNC: 139 MMOL/L (ref 136–145)

## 2024-11-08 PROCEDURE — 97530 THERAPEUTIC ACTIVITIES: CPT

## 2024-11-08 PROCEDURE — 1280000000 HC REHAB R&B

## 2024-11-08 PROCEDURE — 97162 PT EVAL MOD COMPLEX 30 MIN: CPT

## 2024-11-08 PROCEDURE — 97535 SELF CARE MNGMENT TRAINING: CPT

## 2024-11-08 PROCEDURE — 80048 BASIC METABOLIC PNL TOTAL CA: CPT

## 2024-11-08 PROCEDURE — 94761 N-INVAS EAR/PLS OXIMETRY MLT: CPT

## 2024-11-08 PROCEDURE — 92523 SPEECH SOUND LANG COMPREHEN: CPT

## 2024-11-08 PROCEDURE — 97166 OT EVAL MOD COMPLEX 45 MIN: CPT

## 2024-11-08 PROCEDURE — 51798 US URINE CAPACITY MEASURE: CPT

## 2024-11-08 PROCEDURE — 6370000000 HC RX 637 (ALT 250 FOR IP): Performed by: STUDENT IN AN ORGANIZED HEALTH CARE EDUCATION/TRAINING PROGRAM

## 2024-11-08 PROCEDURE — 94669 MECHANICAL CHEST WALL OSCILL: CPT

## 2024-11-08 PROCEDURE — 92610 EVALUATE SWALLOWING FUNCTION: CPT

## 2024-11-08 PROCEDURE — 83735 ASSAY OF MAGNESIUM: CPT

## 2024-11-08 RX ORDER — GABAPENTIN 100 MG/1
200 CAPSULE ORAL 3 TIMES DAILY
Status: DISCONTINUED | OUTPATIENT
Start: 2024-11-08 | End: 2024-11-12

## 2024-11-08 RX ORDER — METHOCARBAMOL 750 MG/1
1500 TABLET, FILM COATED ORAL 3 TIMES DAILY
Status: DISPENSED | OUTPATIENT
Start: 2024-11-08 | End: 2024-11-11

## 2024-11-08 RX ORDER — LANOLIN ALCOHOL/MO/W.PET/CERES
400 CREAM (GRAM) TOPICAL 2 TIMES DAILY
Status: DISCONTINUED | OUTPATIENT
Start: 2024-11-08 | End: 2024-11-13

## 2024-11-08 RX ORDER — LEVOFLOXACIN 500 MG/1
750 TABLET, FILM COATED ORAL DAILY
Status: COMPLETED | OUTPATIENT
Start: 2024-11-08 | End: 2024-11-08

## 2024-11-08 RX ORDER — POLYETHYLENE GLYCOL 3350 17 G/17G
17 POWDER, FOR SOLUTION ORAL 2 TIMES DAILY
Status: DISCONTINUED | OUTPATIENT
Start: 2024-11-08 | End: 2024-11-14

## 2024-11-08 RX ORDER — LIDOCAINE 4 G/G
1 PATCH TOPICAL DAILY PRN
Status: DISCONTINUED | OUTPATIENT
Start: 2024-11-08 | End: 2024-11-22 | Stop reason: HOSPADM

## 2024-11-08 RX ORDER — MULTIVITAMIN WITH IRON
1 TABLET ORAL DAILY
Status: DISCONTINUED | OUTPATIENT
Start: 2024-11-08 | End: 2024-11-22 | Stop reason: HOSPADM

## 2024-11-08 RX ORDER — ACETAMINOPHEN 325 MG/1
650 TABLET ORAL EVERY 4 HOURS PRN
Status: DISCONTINUED | OUTPATIENT
Start: 2024-11-08 | End: 2024-11-22 | Stop reason: HOSPADM

## 2024-11-08 RX ADMIN — POTASSIUM BICARBONATE 50 MEQ: 978 TABLET, EFFERVESCENT ORAL at 08:35

## 2024-11-08 RX ADMIN — MELATONIN TAB 3 MG 3 MG: 3 TAB at 21:35

## 2024-11-08 RX ADMIN — APIXABAN 5 MG: 5 TABLET, FILM COATED ORAL at 21:35

## 2024-11-08 RX ADMIN — GABAPENTIN 200 MG: 100 CAPSULE ORAL at 14:19

## 2024-11-08 RX ADMIN — Medication 400 MG: at 08:33

## 2024-11-08 RX ADMIN — BUSPIRONE HYDROCHLORIDE 5 MG: 5 TABLET ORAL at 08:32

## 2024-11-08 RX ADMIN — GABAPENTIN 100 MG: 100 CAPSULE ORAL at 08:33

## 2024-11-08 RX ADMIN — Medication 400 MG: at 21:35

## 2024-11-08 RX ADMIN — VITAM B12 50 MCG: 100 TAB at 08:32

## 2024-11-08 RX ADMIN — ESCITALOPRAM OXALATE 20 MG: 10 TABLET ORAL at 08:32

## 2024-11-08 RX ADMIN — Medication 50 MG: at 08:33

## 2024-11-08 RX ADMIN — TRAZODONE HYDROCHLORIDE 50 MG: 50 TABLET ORAL at 21:35

## 2024-11-08 RX ADMIN — FOLIC ACID 1 MG: 1 TABLET ORAL at 08:32

## 2024-11-08 RX ADMIN — Medication: at 21:36

## 2024-11-08 RX ADMIN — POLYETHYLENE GLYCOL 3350 17 G: 17 POWDER, FOR SOLUTION ORAL at 21:36

## 2024-11-08 RX ADMIN — APIXABAN 5 MG: 5 TABLET, FILM COATED ORAL at 08:33

## 2024-11-08 RX ADMIN — GABAPENTIN 200 MG: 100 CAPSULE ORAL at 21:35

## 2024-11-08 RX ADMIN — BUSPIRONE HYDROCHLORIDE 5 MG: 5 TABLET ORAL at 14:19

## 2024-11-08 RX ADMIN — Medication 100 MG: at 08:32

## 2024-11-08 RX ADMIN — THERA TABS 1 TABLET: TAB at 08:32

## 2024-11-08 RX ADMIN — LEVOFLOXACIN 750 MG: 500 TABLET, FILM COATED ORAL at 08:32

## 2024-11-08 RX ADMIN — METHOCARBAMOL TABLETS 1500 MG: 750 TABLET, COATED ORAL at 21:36

## 2024-11-08 RX ADMIN — ACETAMINOPHEN 650 MG: 325 TABLET ORAL at 18:23

## 2024-11-08 RX ADMIN — BUSPIRONE HYDROCHLORIDE 5 MG: 5 TABLET ORAL at 21:38

## 2024-11-08 RX ADMIN — POTASSIUM BICARBONATE 50 MEQ: 978 TABLET, EFFERVESCENT ORAL at 21:36

## 2024-11-08 ASSESSMENT — PAIN DESCRIPTION - ORIENTATION: ORIENTATION: MID

## 2024-11-08 ASSESSMENT — PAIN DESCRIPTION - LOCATION
LOCATION: ABDOMEN;BACK
LOCATION: LEG

## 2024-11-08 ASSESSMENT — PAIN SCALES - GENERAL
PAINLEVEL_OUTOF10: 8
PAINLEVEL_OUTOF10: 5
PAINLEVEL_OUTOF10: 4
PAINLEVEL_OUTOF10: 0

## 2024-11-08 ASSESSMENT — PAIN DESCRIPTION - DESCRIPTORS: DESCRIPTORS: ACHING

## 2024-11-08 NOTE — PROGRESS NOTES
[facility] - Inpatient Rehabilitation Department   Phone: [phone]    Occupational Therapy    [x] Initial Evaluation            [] Daily Treatment Note         [] Discharge Summary      Patient: Jm Snow   : 1995   MRN: [id number]   Date of Service:  2024    Admitting Diagnosis:  Critical illness myopathy  Current Admission Summary:   Jm Snow is a 28 y.o. male with PMHx notable for EtOH use disorder, anxiety and HTN who [place] [facility] [place]  with sepsis on , found to have fungenemia of unclear source, possibly secondary to bilateral hip AVN with septic arthritis (however, left hip aspirate cultures with no growth).  He completed 7 days of empiric antibiotics.  Hospital course was complicated by severe TONYA acute renal failure due to ATN, requiring hemodialysis with now recovered renal function.  He additionally developed acute hypoxic respiratory failure secondary to ARDS and aspiration pneumonia requiring intubation on 10/12.  He was transferred to [facility] MICU on 10/12 for further management. Extubated on 10/18.  Hospital course was further complicated by encephalopathy with agitation and psychosis (episodes of hallucination), suspected multifactorial due to metabolic derangements, active infection and possible alcohol withdrawal, as well as ileus.      Developed tachycardia and fever shortly after ARU admission on 10/30, and was discharged acutely to internal medicine service in acute hospital for further workup and management early morning on 10/31.  He developed hypoxia, requiring intermittent high flow oxygen.  CTA chest showed left upper lobe PE, as well as bilateral pleural effusions and pneumonia.  He was restarted on broad-spectrum IV antibiotics, and ID was consulted. Blood cultures no growth to date. He is now completing course of PO Levaquin. He was started on heparin drip for PE, which has now been discontinued in favor of oral anticoagulation with

## 2024-11-08 NOTE — PROGRESS NOTES
Patient was admitted to room 4911 at 1745.  Patient was oriented to the Call Light, Phone, TV, Thermostat, Bed Controls, Bathroom and Emergency Cord.  Patient verbalized and demonstrated understanding of all.  Patient was also given an overview of Unit Routines for Acute Rehab, including the patient's rights and responsibilities as well as the CMS IRF [employer] Privacy Act Statement providing notice of data collection.  Patient states that their normal bowel regime is daily. Meal times were explained, including how to order food.  The white board, (which is posted on the wall by the door is used for communication) has the Therapy Scheduled that is posted each day along with the name of your doctor, nurse, and therapist for your convenience.  We recommend any family that will be care givers or any care givers the patient has, take part in therapy.  We have no set visiting hours, we suggest non-caregiver friends and family visitors come after therapy (at 4 PM or later) to allow patient to rest in between sessions.      In conjunction with the patient and patient’s family, this nurse worked to establish a tailored Fall TIPS plan to ensure patient safety and compliance:    Falls TIPS Completion    Patient identified as increased risk for harm if fall:  [x] Yes     Fall Risks  History of Falls:    [] Yes   Medication Side Effects:   [] Yes   Walking Aid:    [] Yes   IV Pole or Equipment:   [] Yes   Unsteady Walk:     [x] Yes   May Forget or Choose Not to Call: [] Yes     Fall Interventions   Communicate Recent Fall and/or Risk of Harm: [] Yes   Walking Aids: stedy x2 or maxi janna.    Crutches: [] Yes   Cane: [] Yes   Walker: [] Yes   IV Assistance When Walking: [] Yes   Toileting Schedule: Every 1 Hours  Bedpan:   [] Yes   Assist to Commode: [x] Yes   Assist to Bathroom: [x] Yes   Bed Alarm On: [] Yes   Assistance Out of Bed:  Bedrest: [] Yes   1 Person: [] Yes   2 People: [x] Yes

## 2024-11-08 NOTE — PROGRESS NOTES
4 Eyes Skin Assessment     NAME:  Jm Snow  YOB: 1995  MEDICAL RECORD NUMBER:  [id number]    The patient is being assessed for  Admission    I agree that at least one RN has performed a thorough Head to Toe Skin Assessment on the patient. ALL assessment sites listed below have been assessed.      Areas assessed by both nurses:    Head, Face, Ears, Shoulders, Back, Chest, Arms, Elbows, Hands, Sacrum. Buttock, Coccyx, Ischium, and Legs. Feet and Heels.   Unstageable areas to left toes, refer to LDA. Bilateral buttocks and coccyx red and blanching. Right groin wound with slough. ABD fold moist and red. Scattered redness and bruising to BUE/BLE.          Does the Patient have a Wound? Yes wound(s) were present on assessment. LDA wound assessment was Initiated and completed by POOJA Hodge Prevention initiated by RN: Yes  Wound Care Orders initiated by RN: Yes    Pressure Injury (Stage 3,4, Unstageable, DTI, NWPT, and Complex wounds) if present, place Wound referral order by RN under : Yes    New Ostomies, if present place, Ostomy referral order under : No     Nurse 1 eSignature: Electronically signed by Michelle Benz RN on 11/8/24 at 5:20 AM EST    **SHARE this note so that the co-signing nurse can place an eSignature**    Nurse 2 eSignature: Electronically signed by Alanna Khan RN on 11/8/24 at 5:57 AM EST

## 2024-11-08 NOTE — PLAN OF CARE
Problem: Discharge Planning  Goal: Discharge to home or other facility with appropriate resources  Outcome: Progressing  Flowsheets (Taken 11/7/2024 9769)  Discharge to home or other facility with appropriate resources:   Identify barriers to discharge with patient and caregiver   Refer to discharge planning if patient needs post-hospital services based on physician order or complex needs related to functional status, cognitive ability or social support system     Problem: Safety - Adult  Goal: Free from fall injury  Outcome: Progressing      S/W Mother to check on TB test. Mother reports there was a TB test done at [facility].

## 2024-11-08 NOTE — PROGRESS NOTES
[facility] - Inpatient Rehabilitation Department   Phone: [phone]    Physical Therapy    [x] Initial Evaluation            [] Daily Treatment Note         [] Discharge Summary      Patient: Jm Snow   : 1995   MRN: [id number]   Date of Service:  2024  Admitting Diagnosis: Critical illness myopathy  Current Admission Summary: Jm Snow is a 29 y.o. male with PMHx notable for EtOH use disorder, anxiety and HTN who [place] [facility] [place]  with sepsis on , found to have fungenemia of unclear source, possibly secondary to bilateral hip AVN with septic arthritis (however, left hip aspirate cultures with no growth).  He completed 7 days of empiric antibiotics.  Hospital course was complicated by severe TONYA acute renal failure due to ATN, requiring hemodialysis with now recovered renal function.  He additionally developed acute hypoxic respiratory failure secondary to ARDS and aspiration pneumonia requiring intubation on 10/12.  He was transferred to [facility] MICU on 10/12 for further management. Extubated on 10/18.  Hospital course was further complicated by encephalopathy with agitation and psychosis (episodes of hallucination), suspected multifactorial due to metabolic derangements, active infection and possible alcohol withdrawal, as well as ileus.      Developed tachycardia and fever shortly after ARU admission on 10/30, and was discharged acutely to internal medicine service in acute hospital for further workup and management early morning on 10/31.  He developed hypoxia, requiring intermittent high flow oxygen.  CTA chest showed left upper lobe PE, as well as bilateral pleural effusions and pneumonia.  He was restarted on broad-spectrum IV antibiotics, and ID was consulted. Blood cultures no growth to date. He is now completing course of PO Levaquin. He was started on heparin drip for PE, which has now been discontinued in favor of oral anticoagulation with Eliquis.  GI  transfer training, gait training, stair training, endurance training, neuromuscular re-education, wheelchair mobility training, and equipment evaluation/education    Goals  Patient Goals: \"I just want to get home so I can walk a little and get around in my wheelchair if I need to.\"   Short Term Goals:  Time Frame: 14 days  Patient will complete bed mobility at contact guard assistance   Patient will complete transfers at minimal assistance   Patient will ambulate 50 ft with use of rolling walker at minimal assistance  Patient will ascend/descend 1 stairs without use of HR at maximum assistance  Patient will complete car transfer at minimal assistance  Patient will complete manual w/c propulsion 150 ft at modified independent  Patient to maintain standing at minimal assistance for 5 minutes.    Above goals reviewed on 11/8/2024.  All goals are ongoing at this time unless indicated above.      Therapy Session Time      Individual Group Co-treatment   Time In     1245   Time Out     1410   Minutes     85     Timed Code Treatment Minutes:   70 Minutes  Total Treatment Minutes:  85 Minutes       Electronically Signed By: Cinthya Darper, PT, DPT, CNS [id number]

## 2024-11-08 NOTE — PROGRESS NOTES
Assessments completed. Alert, oriented x4, cooperative with care and medications. Patient noted to have anxiousness and tearfulness at times when discussing care. Noted to have spastic movements in arms and BLE continuously that patient states is normal for him. Patient's dad was at bedside and assisted with using urinal until he left for the night. 300cc urine out in urinal. This nurse attempted to assist with using urinal but patient was unable to void with nurse present. Assisted to position in which he could void and allowed privacy. Patient noted to have a large amount of urine voided in bed, bladder scan completed post void showing 303cc urine remaining. Pt. Stating that he would be able to get it out on his own and did not have pain or discomfort. Large loose BM noted earlier in the shift using bed pan. Patient requesting PRN Trazodone at HS and rested comfortably with eyes closed most of the night. In bed, alarm on, bed in lowest position, call light and table within reach. No further needs expressed at this time.

## 2024-11-08 NOTE — PROGRESS NOTES
ARU Admission Assessment    Ethnicity  \"Are you of , /a, or Liberian origin?\"  Check all that apply:  [x] A.  No, not of , /a, or Liberian Origin  [] B.  Yes, Iraqi, Iraqi American, Chicano/a  [] C.  Yes, Canadian  [] D.  Yes, Ervin  [] E.  Yes, another , , or Liberian origin  [] X.  Patient unable to respond  [] Y.  Patient declines to respond    Race  \"What is your race?\"  Check all that apply:  [x] A.  White  [] B.  Black or   [] C.   or   [] D.   Montenegrin  [] E.  Chinese  [] F.  Tunisian  [] G. Eritrean  [] H.  Belarusian  [] I.  Sinhala  [] J.  Other   [] K.    [] L.  Senegalese or Chente  [] M.  Serbian  [] N.  Other   [] X.  Patient unable to respond  [] Y.  Patient declines to respond  [] Z.  None of the above    Language  A.  \"What is your preferred language?\"   English    B.  \"Do you need or want an  to communicate with a doctor or health care staff?\"  Check only one:  [x] 0.  No  [] 1.  Yes  [] 9.  Unable to determine    Transportation  \"Has lack of transportation kept you from medical appointments, meetings, work, or from getting things needed for daily living?\"Check all that apply:  [] A.  Yes, it has kept me from medical appointments or from getting my medications  [] B.  Yes, it has kept me from non-medical meetings, appointments, work, or from getting things that I need  [x] C.  No  [] X.  Patient unable to respond  [] Y.  Patient declines to respond    Hearing  Ability to hear (with hearing aid or hearing appliances if normally used)  [x]  0.  Adequate - no difficulty in normal conversation, social interaction, listening to TV  []  1.  Minimal difficulty - difficulty in some environments (e.g. when person speaks softly or setting is noisy)  []  2.  Moderate difficulty - speaker has to increase volume and speak distinctly   []  3.  Highly impaired - absence of  2 (possible score 0-27, or enter 99 if unable to complete (if symptom frequency (column 2) is blank for 3 or more items).   8     Social Isolation  \"How often do you feel lonely or isolated from those around you?\"  [] 0.  Never  [] 1.  Rarely  [x] 2.  Sometimes  [] 3.  Often  [] 4.  Always  [] 7.  Patient declines to respond  [] 8.  Patient unable to respond    Pain Effect on Sleep  \"Over the past 5 days, how much of the time has pain made it hard for you to sleep at night?\"  [x]  0.  Does not apply - I have not had any pain or hurting in the past 5 days  []  1.  Rarely or not at all  []  2.  Occasionally  []  3.  Frequently  []  4.  Almost constantly  []  8.  Unable to answer    **If the patient answers \"0.  Does not apply\" to this question, skip the next two \"Pain Effect...\" questions**    Pain Interference with Therapy Activities  \"Over the past 5 days, how often have you limited your participation in rehabilitation therapy sessions due to pain?\"  []  0.  Does not apply - I have not received rehabilitation therapy in the past 5 days  []  1.  Rarely or not at all  []  2.  Occasionally  []  3.  Frequently  []  4.  Almost constantly  []  8.  Unable to answer    Pain Interference with Day-to-Day Activities:  \"Over the past 5 days, how often have you limited your day-to-day activities (excluding rehabilitation therapy session)?\"  []  1.  Rarely or not at all  []  2.  Occasionally  []  3.  Frequently  []  4.  Almost constantly  []  8.  Unable to answer    Nutritional Approaches  Check all of the following nutritional approaches that apply on admission:  []  A.  Parenteral/IV feeding (including IV fluids if needed for hydration, but not as part of dialysis/chemo)  []  B.  Feeding tube (e.g., nasogastric or abdominal (PEG))  []  C.  Mechanically altered diet - requires change in texture of food or liquids (e.g., pureed food, thickened liquids)  []  D.  Therapeutic diet (e.g., low salt, diabetic, low cholesterol)  [x]  Z.  reviewed and updated as necessary, and are accurate for the admission assessment period.    Assessing/Reviewing RN: Jerson Benz RN 11/7/24 7256    Assessing/Reviewing RN: Abimbola Flaherty, 11/12/24, 4515

## 2024-11-08 NOTE — PROGRESS NOTES
Nutrition Note    RECOMMENDATIONS  PO Diet: Continue current diet   ONS: Continue Ensure plus HP TID    ASSESSMENT   Nutrition intervention triggered for new admission to ARU and wounds. Patient recently had prolonged hospitalization at  and discharged on 10/30/2024 to ARU.  Patient was only at ARU for less than a day, then transferred to ICU d/t tacchycardia. Upon visit, pt reports diet prior to critical illness consisted of \"small and light meals.\" Suspect ETOH use contributed to poor appetite/PO intake. States his appetite is currently fine, observing snacks at bedside. Reports liking and drinking Ensure plus HP. Pt reports UBW of 280 lbs, with 30 lb wt loss d/t illness. . Unable to verify wt loss d/t insufficient wt hx in EMR.       Malnutrition Status: Mild malnutrition  Acute Illness  Findings of the 6 clinical characteristics of malnutrition:  Energy Intake:  50% or less of estimated energy requirements for 5 or more days  Weight Loss:  Unable to assess (no wt hx in EMR)     Body Fat Loss:  No body fat loss     Muscle Mass Loss:  No muscle mass loss    Fluid Accumulation:  Moderate to Severe Extremities   Strength:  Not Performed      NUTRITION DIAGNOSIS   Increased nutrient needs related to increase demand for energy/nutrients as evidenced by rehab for strength and conditioning, other (prolonged hospitalization and critical illness)    Goals: PO intake 50% or greater, by next RD assessment     NUTRITION RELATED FINDINGS  Objective: LBM 11/8 (diarrhea), Edema: BLE +3, BUE non-pitting.  Wounds: Deep Tissue Injury, Unstageable    CURRENT NUTRITION THERAPIES  ADULT DIET; Regular  ADULT ORAL NUTRITION SUPPLEMENT; Breakfast, Lunch, Dinner; Standard High Calorie/High Protein Oral Supplement       PO Intake: 26-50%   PO Supplement Intake:%      ANTHROPOMETRICS  Current Height: 193 cm (6' 3.98\")  Ideal Body Weight (IBW): 202 lbs  (92 kg)    Usual Bodyweight 127 kg (280 lb) (per pt)       BMI:

## 2024-11-08 NOTE — H&P
Patient: Jm Snow  [id number]  Date: 11/8/2024      Chief Complaint: Critical illness myopathy    History of Present Illness/Hospital Course:  Jm Snow is a 28 y.o. male with PMHx notable for EtOH use disorder, anxiety and HTN who presented to [facility] on 9/30 with sepsis on 9/30, found to have fungenemia of unclear source, possibly secondary to bilateral hip AVN with septic arthritis (however, left hip aspirate cultures with no growth).  He completed 7 days of empiric antibiotics.  Hospital course was complicated by severe TONYA acute renal failure due to ATN, requiring hemodialysis with now recovered renal function.  He additionally developed acute hypoxic respiratory failure secondary to ARDS and aspiration pneumonia requiring intubation on 10/12.  He was transferred to [facility] MICU on 10/12 for further management. Extubated on 10/18.  Hospital course was further complicated by encephalopathy with agitation and psychosis (episodes of hallucination), suspected multifactorial due to metabolic derangements, active infection and possible alcohol withdrawal, as well as ileus.      Developed tachycardia and fever shortly after ARU admission on 10/30, and was discharged acutely to internal medicine service in acute hospital for further workup and management early morning on 10/31.  He developed hypoxia, requiring intermittent high flow oxygen.  CTA chest showed left upper lobe PE, as well as bilateral pleural effusions and pneumonia.  He was restarted on broad-spectrum IV antibiotics, and ID was consulted. Blood cultures no growth to date. He is now completing course of PO Levaquin. He was started on heparin drip for PE, which has now been discontinued in favor of oral anticoagulation with Eliquis.  GI was consulted for mild ileus, managed with NG tube decompression.  Podiatry was consulted for lesions of the left hallux and fourth toe, felt to be eschar from pressor-induced tissue necrosis.     Patient reports  Screen, this Post-Admission Evaluation, and the therapy  evaluations.       Rehabilitation Diagnosis:   IGC: 3.9 - Other Neurological    Assessment and Plan:  #. Critical Illness Myopathy/Polyneuropathy  - Due to septic shock requiring vasopressor support, with acute respiratory failure (due to ARDS, aspiration PNA) requiring mechanical ventilation and acute renal failure requiring intermittent hemodialysis   - Continue PT/OT  - Bracing/DME needs at discharge TBD  - Prevalon boots for heel protection  - Gabapentin started for neuropathic pain    #. Multifocal PNA  - Sepsis resolved  - Completing course of PO Levaquin, last dose 11/8    #. PE  - BLE Venous Duplex: negative for DVT  - Continue Eliquis     #. Recent ileus, resolved  - Tolerating diet, having bowel movements  - Continue bowel regimen  - Avoid narcotics    #. Hx of bilateral hip AVN  - Needs outpatient Ortho follow-up  - Multimodal pain management, as below    #. Lumbar spondylosis  - MRI L-Spine: Mild multilevel degenerative disease and facet arthrosis centered at L5-S1 with moderate spinal canal stenosis, moderate left and mild right neural foraminal narrowing at that level.   - Continue PT  - Pain management as below    #. EtOH use disorder  - Out of withdrawal window  - Continue thiamine, multivitamin  - Counseling/education, community resources at discharge    #. Hypothyroidism  - TSH 12.3, T4 1.1 on 10/31  - Recheck as outpatient    #. HTN  - Continue home valsartan 80 mg daily    #. Anxiety  - Continue escitalopram 20 mg daily, buspirone 5 mg TID    #. Left hallux and 4th toe skin necrosis  - Due to vasopressor induced ischemic injury  - Podiatry evaluated, recommended betadine paint daily.     #. FEN  - Hypomagnesemia, continue PO repletion  - Hypokalemia, continue PO repletion  - Continue zinc repletion  - Continue B12 repletion  - Daily labs over the weekend    #. TONYA, resolved  - Required intermittent HD at [facility]    CODE: Full Code  Diet: ADULT

## 2024-11-08 NOTE — PLAN OF CARE
ARU PATIENT TREATMENT PLAN  [facility]  [address] [place] [address]  [phone]      Jm Snow    : 1995  [facility] #: [id number]  MRN: [id number]  PHYSICIAN:  Chinedu Rosas MD  Primary Problem    Patient Active Problem List   Diagnosis    Sepsis with acute hypoxic respiratory failure without septic shock (HCC)    Swelling of lower extremity    Cocaine abuse (HCC)    Overweight (BMI 25.0-29.9)    History of alcoholism (HCC)    Avascular necrosis of bone of hip    Multifocal pneumonia    Acute respiratory failure with hypoxia    Ileus (HCC)    Alcohol cessation counseling    Drug abuse counseling and surveillance of drug abuser    Pulmonary embolism (HCC)    Critical illness myopathy       Rehabilitation Diagnosis:  Critical Illness Myopathy    ADMIT DATE:2024    Patient Goals: \"I just want to get home so I can walk a little and get around in my wheelchair if I need to.\",  \"to be able to get up and walk\".  Admitting Impairments: Neurologic Condition - 3.8 - Neuromuscular Disorder  Activities: Impaired Eating, Hygiene, Toileting, Bathing, Dressing, Bed Mobility, Transfers, Ambulation, Stairs, and Endurance.  Participation: Prior to admission patient was living at home with family, was independent with all mobility and activities, was not an active .     CARE PLAN     NURSING:  Jm Snow while on this unit will:  [x] Be continent of bowel and bladder     [x] Have an adequate number of bowel movements  [] Urinate with no urinary retention >300ml in bladder  [] Complete bladder protocol with martinez removal  [x] Maintain O2 SATs at greater than 90%  [x] Have pain managed while on ARU       [] Be pain free by discharge   [x] Have no skin breakdown while on ARU  [] Have improved skin integrity via wound measurements  [] Have no signs/symptoms of infection at the wound site  [x] Be free from injury during hospitalization   [] Complete education with  body ADL at moderate assistance   Patient will complete toileting at moderate assistance   Patient will complete functional transfers at moderate assistance   Patient to maintain standing at contact guard assistance for 3 minutes.  Long Term Goals:  To be completed in: 2-3 weeks   Patient will complete upper body ADL at modified independent   Patient will complete lower body ADL at modified independent   Patient will complete toileting at modified independent   Patient will complete functional transfers at modified independent       These goals were reviewed with this patient at the time of assessment and Jm Snow is in agreement    Plan of Care:  Pt to be seen 5 out of 7 days per week per ARU protocol ( 90 minutes with OT)     SPEECH THERAPY: Goals will be left blank if speech is not following this patient.    Pt presents with functional swallow mechanism. Oral motor exam revealed functional lingual, labial, and buccal ROM and coordination. Dentition was adequate for mastication. Laryngeal function assessment revealed present volitional swallow, weak volitional cough, and clear vocal quality. Pt was assessed with: thin liquids (spoon, cup, straw), soft and bite-sized, and solids. The oral phase was characterized by functional mastication and oral clearance of regular solids. There were no overt concerns for pharyngeal phase impairments, no overt s/s of aspiration with any presented consistencies, timing and strength of laryngeal elevation was adequate.  No further SLP dysphagia intervention is warranted at this time.     Plan of Care:  Pt to be seen 5 out of 7 days per week per ARU protocol ( 0 minutes with SLP)    Therapy Treatments will include:  [x]  therapeutic exercises    [x]  gait training     [x]  neuromuscular re-ed                            [x]  transfer training             [] community reintegration    [x] bed mobility                          [x]  w/c mobility and training  [x]  self care     [id number]  11/8/24  2:52 PM

## 2024-11-08 NOTE — PROGRESS NOTES
Admission Period/Goal QM Codes for Jm Snow.    QM Admit Code Goal Code   Eating 3 6   Oral Hygiene 2 6   Toileting Hygiene 1 6   Shower/Bathing 1 6   UB Dressing 4 6   LB Dressing 1 6   Putting on/off Footwear 1 6   Rolling Left and Right 1 4   Sit To Lying 1 4   Lying to Sitting on Bedside 1 4   Sit to Stand 1 4   Chair/Bed to Chair Transfer 1 4   Toilet Transfers 1 6   Car Transfers 88 3   Walk 10 Feet 88 3   Walk 50 Feet with Two Turns 88 3   Walk 150 Feet 88 -   Walk 10 Feet on Uneven Surfaces 88 3   1 Step (Curb) 88 2   4 Steps 88 -   12 Steps 88 -   Picking up Object from Floor 88 -   Wheel 50 Feet with 2 Turns 88 6   Type Manual Manual   Wheel 150 Feet 88 6   Type Manual Manual       The above codes were determined by the treatment team to be the patient's accurate admission assessment codes based on assessment performed soon after the patient's admission and prior to the benefit of services provided by staff, or if appropriate, the patient's usual performance at admission.    OT: JOSE Pak OTR/L, [id number] 11/11/2024 2:19 PM     PT:  Chuy Wang PT, DPT - [id number], 11/13/2024 3:51 PM     RN:  CHRISTINE MatiasN, CRRN 11/13/24, 1300     :  Camilo Chavez PT, DPT [id number]  11/13/24  5:19 PM

## 2024-11-08 NOTE — PROGRESS NOTES
[facility] - Inpatient Rehabilitation Department   Phone: [phone]    Speech Therapy    [x] Initial Evaluation            [] Daily Treatment Note         [] Discharge Summary      Patient: Jm Snow   : 1995   MRN: [id number]   Date of Service:  2024  Admitting Diagnosis: Critical illness myopathy  Current Admission Summary: Jm Snow is a 28 y.o. male with PMHx notable for EtOH use disorder, anxiety and HTN who presented to [facility] on  with sepsis on , found to have fungenemia of unclear source, possibly secondary to bilateral hip AVN with septic arthritis (however, left hip aspirate cultures with no growth).  He completed 7 days of empiric antibiotics.  Hospital course was complicated by severe TONYA acute renal failure due to ATN, requiring hemodialysis with now recovered renal function.  He additionally developed acute hypoxic respiratory failure secondary to ARDS and aspiration pneumonia requiring intubation on 10/12.  He was transferred to [facility] MICU on 10/12 for further management. Extubated on 10/18.  Hospital course was further complicated by encephalopathy with agitation and psychosis (episodes of hallucination), suspected multifactorial due to metabolic derangements, active infection and possible alcohol withdrawal, as well as ileus.      Developed tachycardia and fever shortly after ARU admission on 10/30, and was discharged acutely to internal medicine service in acute hospital for further workup and management early morning on 10/31.  He developed hypoxia, requiring intermittent high flow oxygen.  CTA chest showed left upper lobe PE, as well as bilateral pleural effusions and pneumonia.  He was restarted on broad-spectrum IV antibiotics, and ID was consulted. Blood cultures no growth to date. He is now completing course of PO Levaquin. He was started on heparin drip for PE, which has now been discontinued in favor of oral anticoagulation with Eliquis.  GI was

## 2024-11-09 LAB
ANION GAP SERPL CALCULATED.3IONS-SCNC: 10 MMOL/L (ref 3–16)
BUN SERPL-MCNC: 4 MG/DL (ref 7–20)
CALCIUM SERPL-MCNC: 7.4 MG/DL (ref 8.3–10.6)
CHLORIDE SERPL-SCNC: 108 MMOL/L (ref 99–110)
CO2 SERPL-SCNC: 21 MMOL/L (ref 21–32)
CREAT SERPL-MCNC: 0.5 MG/DL (ref 0.9–1.3)
GFR SERPLBLD CREATININE-BSD FMLA CKD-EPI: >90 ML/MIN/{1.73_M2}
GLUCOSE SERPL-MCNC: 86 MG/DL (ref 70–99)
POTASSIUM SERPL-SCNC: 3.7 MMOL/L (ref 3.5–5.1)
SODIUM SERPL-SCNC: 139 MMOL/L (ref 136–145)

## 2024-11-09 PROCEDURE — 6370000000 HC RX 637 (ALT 250 FOR IP): Performed by: STUDENT IN AN ORGANIZED HEALTH CARE EDUCATION/TRAINING PROGRAM

## 2024-11-09 PROCEDURE — 97530 THERAPEUTIC ACTIVITIES: CPT

## 2024-11-09 PROCEDURE — 80048 BASIC METABOLIC PNL TOTAL CA: CPT

## 2024-11-09 PROCEDURE — 1280000000 HC REHAB R&B

## 2024-11-09 PROCEDURE — 97535 SELF CARE MNGMENT TRAINING: CPT

## 2024-11-09 PROCEDURE — 97110 THERAPEUTIC EXERCISES: CPT

## 2024-11-09 RX ORDER — TRAMADOL HYDROCHLORIDE 50 MG/1
50 TABLET ORAL EVERY 6 HOURS PRN
Status: DISCONTINUED | OUTPATIENT
Start: 2024-11-09 | End: 2024-11-12

## 2024-11-09 RX ORDER — TRAMADOL HYDROCHLORIDE 50 MG/1
50 TABLET ORAL EVERY 6 HOURS PRN
Status: DISCONTINUED | OUTPATIENT
Start: 2024-11-09 | End: 2024-11-09 | Stop reason: SDUPTHER

## 2024-11-09 RX ORDER — ESCITALOPRAM OXALATE 10 MG/1
20 TABLET ORAL
Status: DISCONTINUED | OUTPATIENT
Start: 2024-11-10 | End: 2024-11-22 | Stop reason: HOSPADM

## 2024-11-09 RX ORDER — TRAMADOL HYDROCHLORIDE 50 MG/1
100 TABLET ORAL EVERY 6 HOURS PRN
Status: DISCONTINUED | OUTPATIENT
Start: 2024-11-09 | End: 2024-11-12

## 2024-11-09 RX ORDER — BUSPIRONE HYDROCHLORIDE 5 MG/1
10 TABLET ORAL 3 TIMES DAILY
Status: DISCONTINUED | OUTPATIENT
Start: 2024-11-09 | End: 2024-11-22 | Stop reason: HOSPADM

## 2024-11-09 RX ADMIN — Medication 400 MG: at 20:46

## 2024-11-09 RX ADMIN — Medication 400 MG: at 09:57

## 2024-11-09 RX ADMIN — GABAPENTIN 200 MG: 100 CAPSULE ORAL at 09:58

## 2024-11-09 RX ADMIN — APIXABAN 5 MG: 5 TABLET, FILM COATED ORAL at 09:57

## 2024-11-09 RX ADMIN — METHOCARBAMOL TABLETS 1500 MG: 750 TABLET, COATED ORAL at 20:47

## 2024-11-09 RX ADMIN — THERA TABS 1 TABLET: TAB at 09:58

## 2024-11-09 RX ADMIN — POTASSIUM BICARBONATE 25 MEQ: 978 TABLET, EFFERVESCENT ORAL at 09:58

## 2024-11-09 RX ADMIN — ESCITALOPRAM OXALATE 20 MG: 10 TABLET ORAL at 09:57

## 2024-11-09 RX ADMIN — METHOCARBAMOL TABLETS 1500 MG: 750 TABLET, COATED ORAL at 14:25

## 2024-11-09 RX ADMIN — FOLIC ACID 1 MG: 1 TABLET ORAL at 09:58

## 2024-11-09 RX ADMIN — APIXABAN 5 MG: 5 TABLET, FILM COATED ORAL at 20:47

## 2024-11-09 RX ADMIN — Medication 100 MG: at 09:58

## 2024-11-09 RX ADMIN — Medication: at 09:59

## 2024-11-09 RX ADMIN — BUSPIRONE HYDROCHLORIDE 10 MG: 5 TABLET ORAL at 14:25

## 2024-11-09 RX ADMIN — POTASSIUM BICARBONATE 25 MEQ: 978 TABLET, EFFERVESCENT ORAL at 20:45

## 2024-11-09 RX ADMIN — GABAPENTIN 200 MG: 100 CAPSULE ORAL at 14:25

## 2024-11-09 RX ADMIN — Medication 50 MG: at 09:58

## 2024-11-09 RX ADMIN — BUSPIRONE HYDROCHLORIDE 5 MG: 5 TABLET ORAL at 09:57

## 2024-11-09 RX ADMIN — VITAM B12 50 MCG: 100 TAB at 09:57

## 2024-11-09 RX ADMIN — TRAZODONE HYDROCHLORIDE 50 MG: 50 TABLET ORAL at 20:50

## 2024-11-09 RX ADMIN — BUSPIRONE HYDROCHLORIDE 10 MG: 5 TABLET ORAL at 20:46

## 2024-11-09 RX ADMIN — METHOCARBAMOL TABLETS 1500 MG: 750 TABLET, COATED ORAL at 09:57

## 2024-11-09 RX ADMIN — GABAPENTIN 200 MG: 100 CAPSULE ORAL at 20:47

## 2024-11-09 ASSESSMENT — PAIN - FUNCTIONAL ASSESSMENT: PAIN_FUNCTIONAL_ASSESSMENT: ACTIVITIES ARE NOT PREVENTED

## 2024-11-09 ASSESSMENT — PAIN DESCRIPTION - PAIN TYPE: TYPE: ACUTE PAIN

## 2024-11-09 ASSESSMENT — PAIN DESCRIPTION - LOCATION: LOCATION: LEG

## 2024-11-09 ASSESSMENT — PAIN SCALES - GENERAL
PAINLEVEL_OUTOF10: 8
PAINLEVEL_OUTOF10: 0

## 2024-11-09 ASSESSMENT — PAIN DESCRIPTION - ORIENTATION: ORIENTATION: RIGHT;LEFT

## 2024-11-09 ASSESSMENT — PAIN DESCRIPTION - DESCRIPTORS: DESCRIPTORS: SHARP;PRESSURE

## 2024-11-09 NOTE — PLAN OF CARE
Problem: Safety - Adult  Goal: Free from fall injury  Outcome: Progressing     Problem: Pain  Goal: Verbalizes/displays adequate comfort level or baseline comfort level  Outcome: Progressing     Problem: Nutrition Deficit:  Goal: Optimize nutritional status  Outcome: Progressing  F

## 2024-11-09 NOTE — PROGRESS NOTES
[facility] - Inpatient Rehabilitation Department   Phone: [phone]    Physical Therapy    [] Initial Evaluation            [x] Daily Treatment Note         [] Discharge Summary      Patient: Jm Snow   : 1995   MRN: [id number]   Date of Service:  2024  Admitting Diagnosis: Critical illness myopathy  Current Admission Summary: Jm Snow is a 29 y.o. 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  He completed 7 days of empiric antibiotics.  Hospital course was complicated by severe TONYA acute renal failure due to ATN, requiring hemodialysis with now recovered renal function.  He additionally developed acute hypoxic respiratory failure secondary to ARDS and aspiration pneumonia requiring intubation on 10/12.  He was transferred to [facility] MICU on 10/12 for further management. Extubated on 10/18.  Hospital course was further complicated by encephalopathy with agitation and psychosis (episodes of hallucination), suspected multifactorial due to metabolic derangements, active infection and possible alcohol withdrawal, as well as ileus.      Developed tachycardia and fever shortly after ARU admission on 10/30, and was discharged acutely to internal medicine service in acute hospital for further workup and management early morning on 10/31.  He developed hypoxia, requiring intermittent high flow oxygen.  CTA chest showed left upper lobe PE, as well as bilateral pleural effusions and pneumonia.  He was restarted on broad-spectrum IV antibiotics, and ID was consulted. Blood cultures no growth to date. He is now completing course of PO Levaquin. He was started on heparin drip for PE, which has now been discontinued in favor of oral anticoagulation with Eliquis.  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 Pt was in outpatient PT at [facility] and reported some improvement with PT intervention. Pain and mobility deficits have worsened since July, requiring pt to move into parent's home. Prior to July, pt was IND with all mobility and ADLs, living alone in apartment.     Examination   Vision:   Vision Gross Assessment: WFL  Hearing:   WFL  Observation:   General Observation:  pt trembling upon arrival and throughout session, reports due to anxiety; male external catheter, R PIV, dry skin   Posture:   Increased cervicothoracic kyphosis, shoulder elevation        Subjective  General: Upright in bed at arrival.  Reports he still can't move his legs (was able to wiggle his toes and move some at the ankles but unable to lift LEs off the bed).  Agreed to therapy session.    Pain: Patient does not rate upon questioning Stated pain but also numbness in his legs;  also stated his abdomen hurt after mobility    Pain Interventions: RN notified       Functional Mobility  Bed Mobility:  Supine to Sit: 2 person assistance with mod A of 2   Scooting: minimal assistance  Comments: HOB was elevated, pt able to move his legs towards the EOB but needs some assist to get them fully over the edge, then required trunk assistance  Transfers:  Sit to stand transfer: stedy utilized requiring max A x 2   Stand to sit transfer: stedy utilized requiring max A x 2   Comments: Bed was elevated but was unsuccessful with first trial.  Elevated the bed further during 2nd attempt.  Pillow placed at his  knees for comfort. Assist to position his Left foot safely on the device.  Additional 2 stands within the // bars with max A of 2 from the wc level.     Ambulation:  Ambulation not tested on this date secondary to pt unable to tolerate this date.  Distance:   Gait Mechanics:   Comments:    Stair Mobility:  Stair mobility not completed on this date.  Comments:  Wheelchair Mobility:  No w/c mobility completed on this date.  Comments:  Balance:  Static Sitting Balance: fair (-): maintains balance at SBA with use of UE support  Dynamic Sitting Balance: fair (-): maintains balance at CGA with use of UE support  Static Standing Balance: poor (-): requires max (A) to maintain balance  Comments:    Other Therapeutic Interventions  1st session: Cotx with OT to maximize functional mobility and safety.  Once seated EOB, OT assisted with donning a depends and pants (pt attempted but unable to perform) while provided CGA for sitting balance.   2 stands within the // bars for ~ 3 minutes each with max A to maintain stance. Pt stood with a slight posterior lean, heavy reliance on his UEs.  Vc/Tc at trunk and gluts plus blocking at the knees to avoid any buckling. Assist for proper UE placement to help with posture.  During the 2nd stance pt performed head turns and med/lat wt shifts.  1 anterior LOB due to pt moving his LUE fwd on the bar. Max A to correct.  Pt encouraged to sit in the recliner at least 1 hour and again for all meals.  Breakfast set-up at end of session.     2nd session: Cotx with OT.  Pt supine in bed at arrival.  Rated pain 10/10. Nsg aware.  Supine to sit with mod A. Pt walked legs over to EOB, assist for reaching with LUE to the right railing and mod A of trunk.  While seated without trunk support, OT educated pt on use of the sock aide providing a visual demonstration and then had pt practice donning his socks (see OT note for assist levels). PT providing CGA for trunk balance support during dynamic task.

## 2024-11-09 NOTE — PROGRESS NOTES
Pt comfortably resting in bed. VSS, , pt states it's his baseline. Denies any pain at this moment. Morning meds given per MAR. PWWW. AXOX4. Call light and bedside table in reach. Bed in the lowest position, locked and alarm on. The care plan and education has been reviewed and mutually agreed upon with the patient.

## 2024-11-09 NOTE — PROGRESS NOTES
[facility] - Inpatient Rehabilitation Department   Phone: [phone]    Occupational Therapy    [] Initial Evaluation            [x] Daily Treatment Note         [] Discharge Summary      Patient: Jm Snow   : 1995   MRN: [id number]   Date of Service:  2024    Admitting Diagnosis:  Critical illness myopathy  Current Admission Summary:   Jm Snow is a 28 y.o. male with PMHx notable for EtOH use disorder, anxiety and HTN who presented to [facility] on  with sepsis on , found to have fungenemia of unclear source, possibly secondary to bilateral hip AVN with septic arthritis (however, left hip aspirate cultures with no growth).  He completed 7 days of empiric antibiotics.  Hospital course was complicated by severe TONYA acute renal failure due to ATN, requiring hemodialysis with now recovered renal function.  He additionally developed acute hypoxic respiratory failure secondary to ARDS and aspiration pneumonia requiring intubation on 10/12.  He was transferred to [facility] MICU on 10/12 for further management. Extubated on 10/18.  Hospital course was further complicated by encephalopathy with agitation and psychosis (episodes of hallucination), suspected multifactorial due to metabolic derangements, active infection and possible alcohol withdrawal, as well as ileus.      Developed tachycardia and fever shortly after ARU admission on 10/30, and was discharged acutely to internal medicine service in acute hospital for further workup and management early morning on 10/31.  He developed hypoxia, requiring intermittent high flow oxygen.  CTA chest showed left upper lobe PE, as well as bilateral pleural effusions and pneumonia.  He was restarted on broad-spectrum IV antibiotics, and ID was consulted. Blood cultures no growth to date. He is now completing course of PO Levaquin. He was started on heparin drip for PE, which has now been discontinued in favor of oral anticoagulation with  "EMS reports patient called due to chest pain 10/10 onset 2215 and SOB. Pain Free after 2 Nitro. Pt. took 324 Aspirin prior to EMS arrival. Patient HR in 200s, SVT when EMS arrived, converted to A Fib 127. Pt denies any current chest pain or SOB. States he "felt sluggish today"    Patient identifiers verified and correct for Ibrahima Phelps.  LOC: The patient is awake, alert and aware of environment with an appropriate affect, the patient is oriented x 3 and speaking appropriately.   APPEARANCE: Patient appears comfortable and in no acute distress, patient is clean and well groomed.  SKIN: The skin is warm and dry, color consistent with ethnicity, patient has normal skin turgor and moist mucus membranes, skin intact, no breakdown or bruising noted.   MUSCULOSKELETAL: Patient moving all extremities spontaneously, no swelling noted.  RESPIRATORY: Airway is open and patent, respirations are spontaneous, patient has a normal effort and rate, no accessory muscle use noted, pt placed on continuous pulse ox with O2 sats noted at 99% on room air. SOB at home with CP  CARDIAC: Pt placed on cardiac monitor. Patient has a irregular rate and irregular rhythm, no edema noted, capillary refill < 3 seconds. Pt had CP at home, 10/10. No pain at present  GASTRO: Soft and non tender to palpation, no distention noted, normoactive bowel sounds present in all four quadrants. Pt states bowel movements have been regular.  : Pt denies any pain or frequency with urination.  NEURO: Pt opens eyes spontaneously, behavior appropriate to situation, follows commands, facial expression symmetrical, bilateral hand grasp equal and even, purposeful motor response noted, normal sensation in all extremities when touched with a finger.    " persons. Pt completed 6 minutes in stepper with emphasis on LEs.Pt stood in stedy at ballet bar and completed lateral arm slides x5 reps on each side, he then erased numbers off mirror on the same side and cross body. Pt required varying Min-Mod A throughout. Pt completed 3 standing marches per LE in stedy with Mod A for balance. Pt required 1 person assist for balance and 1 person assist to return RLE to a safe position. Pt assisted back to room. Pt seated in recliner at EOS, declined tensos due to sensitivity despite swollen LEs. Chair alarm on , call light within reach and all needs met.    Cognition  WFL  Comments: patient with baseline anxiety   Orientation:    alert and oriented x 4  Command Following:   WFL     Education  Barriers To Learning: none  Patient Education: patient educated on goals, OT role and benefits, plan of care, ADL adaptive strategies, proper use of assistive device/equipment, transfer training, discharge recommendations  Learning Assessment:  patient verbalizes and demonstrates understanding    Assessment  Activity Tolerance: tolerated well. Limited by generalized weakness  No SOB or dizziness.   Impairments Requiring Therapeutic Intervention: decreased functional mobility, decreased ADL status, decreased strength, decreased endurance, decreased balance, decreased IADL, decreased coordination, decreased posture  Prognosis: good  Clinical Assessment: Patient presenting to below baseline function secondary to critical illness myopathy following complicated and prolonged hospital stay. Refer to admission summary for details. Patient is independent at baseline for ADLs and mobility (mod I with quad cane since July 2024). Patient currently requires Ma x A x2 persons for transfers using the stedy this date. Pt required Setup with SUP for UB ADLs and Dependent assistance for LB ADLs this date. Patient limited by global weakness, decreased endurance and anxiety. Continued skilled occupational

## 2024-11-09 NOTE — PLAN OF CARE
Problem: Discharge Planning  Goal: Discharge to home or other facility with appropriate resources  11/9/2024 1101 by Yoandy Louise RN  Outcome: Progressing     Problem: Safety - Adult  Goal: Free from fall injury  11/9/2024 1101 by Yoandy Louise RN  Outcome: Progressing     Problem: Pain  Goal: Verbalizes/displays adequate comfort level or baseline comfort level  11/9/2024 1101 by Yoandy Louise RN  Outcome: Progressing     Problem: Skin/Tissue Integrity  Goal: Absence of new skin breakdown  Description: 1.  Monitor for areas of redness and/or skin breakdown  2.  Assess vascular access sites hourly  3.  Every 4-6 hours minimum:  Change oxygen saturation probe site  4.  Every 4-6 hours:  If on nasal continuous positive airway pressure, respiratory therapy assess nares and determine need for appliance change or resting period.  Outcome: Progressing

## 2024-11-09 NOTE — PROGRESS NOTES
Assessment complete. Pt sinus tachy.  Family bedside (very helpful and supportive). All evening medications given. VSSA with no c/o pain or discomfort. Pt currently eating chipotle bowl. All needs addressed. Bed locked, in lowest position, with bed alarm on. Bedside table and call light within reach. Plan of care ongoing.    0200- pt called out extremely anxious, tearful and restless stating \"I can't feel my legs from the knee down but they hurt.\" Pt describes pain in legs as sharp and a lot of pressure. On call notified, prn tramadol ordered. Father came bedside and stated no narcotics for patient. Father states \"he is not in pain , he is in his head. This is a mental issue. I will sit with him until he gets through this crisis.\" Father at bedside consoling patient. Will make more frequent checks.

## 2024-11-10 LAB
ANION GAP SERPL CALCULATED.3IONS-SCNC: 11 MMOL/L (ref 3–16)
BUN SERPL-MCNC: 4 MG/DL (ref 7–20)
CALCIUM SERPL-MCNC: 7.1 MG/DL (ref 8.3–10.6)
CHLORIDE SERPL-SCNC: 107 MMOL/L (ref 99–110)
CO2 SERPL-SCNC: 21 MMOL/L (ref 21–32)
CREAT SERPL-MCNC: 0.4 MG/DL (ref 0.9–1.3)
GFR SERPLBLD CREATININE-BSD FMLA CKD-EPI: >90 ML/MIN/{1.73_M2}
GLUCOSE SERPL-MCNC: 89 MG/DL (ref 70–99)
POTASSIUM SERPL-SCNC: 3.9 MMOL/L (ref 3.5–5.1)
SODIUM SERPL-SCNC: 139 MMOL/L (ref 136–145)

## 2024-11-10 PROCEDURE — 1280000000 HC REHAB R&B

## 2024-11-10 PROCEDURE — 2580000003 HC RX 258: Performed by: STUDENT IN AN ORGANIZED HEALTH CARE EDUCATION/TRAINING PROGRAM

## 2024-11-10 PROCEDURE — 80048 BASIC METABOLIC PNL TOTAL CA: CPT

## 2024-11-10 PROCEDURE — 94669 MECHANICAL CHEST WALL OSCILL: CPT

## 2024-11-10 PROCEDURE — 36415 COLL VENOUS BLD VENIPUNCTURE: CPT

## 2024-11-10 PROCEDURE — 6370000000 HC RX 637 (ALT 250 FOR IP): Performed by: STUDENT IN AN ORGANIZED HEALTH CARE EDUCATION/TRAINING PROGRAM

## 2024-11-10 RX ORDER — SODIUM CHLORIDE 0.9 % (FLUSH) 0.9 %
5-40 SYRINGE (ML) INJECTION 2 TIMES DAILY
Status: DISCONTINUED | OUTPATIENT
Start: 2024-11-10 | End: 2024-11-13

## 2024-11-10 RX ADMIN — GABAPENTIN 200 MG: 100 CAPSULE ORAL at 20:56

## 2024-11-10 RX ADMIN — Medication 400 MG: at 09:20

## 2024-11-10 RX ADMIN — BUSPIRONE HYDROCHLORIDE 10 MG: 5 TABLET ORAL at 13:51

## 2024-11-10 RX ADMIN — METHOCARBAMOL TABLETS 1500 MG: 750 TABLET, COATED ORAL at 20:51

## 2024-11-10 RX ADMIN — SODIUM CHLORIDE, PRESERVATIVE FREE 10 ML: 5 INJECTION INTRAVENOUS at 13:53

## 2024-11-10 RX ADMIN — APIXABAN 5 MG: 5 TABLET, FILM COATED ORAL at 20:51

## 2024-11-10 RX ADMIN — ESCITALOPRAM OXALATE 20 MG: 10 TABLET ORAL at 05:34

## 2024-11-10 RX ADMIN — FOLIC ACID 1 MG: 1 TABLET ORAL at 09:20

## 2024-11-10 RX ADMIN — Medication 50 MG: at 09:20

## 2024-11-10 RX ADMIN — METHOCARBAMOL TABLETS 1500 MG: 750 TABLET, COATED ORAL at 09:20

## 2024-11-10 RX ADMIN — APIXABAN 5 MG: 5 TABLET, FILM COATED ORAL at 09:20

## 2024-11-10 RX ADMIN — BUSPIRONE HYDROCHLORIDE 10 MG: 5 TABLET ORAL at 09:19

## 2024-11-10 RX ADMIN — GABAPENTIN 200 MG: 100 CAPSULE ORAL at 09:19

## 2024-11-10 RX ADMIN — THERA TABS 1 TABLET: TAB at 09:20

## 2024-11-10 RX ADMIN — Medication: at 09:21

## 2024-11-10 RX ADMIN — VITAM B12 50 MCG: 100 TAB at 09:20

## 2024-11-10 RX ADMIN — POTASSIUM BICARBONATE 25 MEQ: 978 TABLET, EFFERVESCENT ORAL at 20:49

## 2024-11-10 RX ADMIN — POTASSIUM BICARBONATE 25 MEQ: 978 TABLET, EFFERVESCENT ORAL at 09:20

## 2024-11-10 RX ADMIN — BUSPIRONE HYDROCHLORIDE 10 MG: 5 TABLET ORAL at 20:54

## 2024-11-10 RX ADMIN — VALSARTAN 80 MG: 80 TABLET, FILM COATED ORAL at 09:20

## 2024-11-10 RX ADMIN — TRAZODONE HYDROCHLORIDE 50 MG: 50 TABLET ORAL at 20:54

## 2024-11-10 RX ADMIN — SODIUM CHLORIDE, PRESERVATIVE FREE 10 ML: 5 INJECTION INTRAVENOUS at 20:58

## 2024-11-10 RX ADMIN — Medication 400 MG: at 20:53

## 2024-11-10 RX ADMIN — Medication 100 MG: at 09:20

## 2024-11-10 RX ADMIN — GABAPENTIN 200 MG: 100 CAPSULE ORAL at 13:51

## 2024-11-10 RX ADMIN — METHOCARBAMOL TABLETS 1500 MG: 750 TABLET, COATED ORAL at 13:51

## 2024-11-10 RX ADMIN — ACETAMINOPHEN 650 MG: 325 TABLET ORAL at 09:25

## 2024-11-10 ASSESSMENT — PAIN DESCRIPTION - LOCATION: LOCATION: BACK

## 2024-11-10 ASSESSMENT — PAIN - FUNCTIONAL ASSESSMENT
PAIN_FUNCTIONAL_ASSESSMENT: ACTIVITIES ARE NOT PREVENTED
PAIN_FUNCTIONAL_ASSESSMENT: ACTIVITIES ARE NOT PREVENTED

## 2024-11-10 ASSESSMENT — PAIN SCALES - GENERAL
PAINLEVEL_OUTOF10: 0
PAINLEVEL_OUTOF10: 2
PAINLEVEL_OUTOF10: 7
PAINLEVEL_OUTOF10: 2

## 2024-11-10 ASSESSMENT — PAIN DESCRIPTION - DESCRIPTORS: DESCRIPTORS: PATIENT UNABLE TO DESCRIBE

## 2024-11-10 NOTE — PROGRESS NOTES
[facility] Inpatient Rehabilitation Progress Note    Jm Snow  11/9/2024  [id number]    Chief Complaint: Critical illness myopathy    Subjective:   No acute events overnight. Patient states that he is feeling more anxious, asks about his blood work. All questions answered.    ROS: Patient Denies Fevers/Chills, Nausea/vomiting, or Chest pain.     Objective:  Patient Vitals for the past 24 hrs:   BP Temp Temp src Pulse Resp SpO2 Weight   11/09/24 2030 138/87 98 °F (36.7 °C) Oral (!) 116 18 98 % --   11/09/24 0955 112/62 97.8 °F (36.6 °C) Oral (!) 118 20 98 % --   11/09/24 0945 -- -- -- -- -- -- 114.8 kg (253 lb 1.6 oz)   11/09/24 0200 121/80 97.8 °F (36.6 °C) Oral (!) 118 22 97 % --       Gen: No distress, pleasant.   HEENT: Normocephalic, atraumatic.   CV: Regular rate and rhythm.   Resp: No respiratory distress.   Abd: Soft, nontender   Ext: No edema.   Neuro: Alert, oriented, appropriately interactive.     Wt Readings from Last 3 Encounters:   11/09/24 114.8 kg (253 lb 1.6 oz)   11/06/24 113.4 kg (250 lb)   10/30/24 112.8 kg (248 lb 10.9 oz)       Laboratory data:   Lab Results   Component Value Date    WBC 3.0 (L) 11/07/2024    HGB 8.2 (L) 11/07/2024    HCT 25.0 (L) 11/07/2024    MCV 90.9 11/07/2024     11/07/2024       Lab Results   Component Value Date/Time     11/09/2024 06:01 AM    K 3.7 11/09/2024 06:01 AM     11/09/2024 06:01 AM    CO2 21 11/09/2024 06:01 AM    BUN 4 11/09/2024 06:01 AM    CREATININE 0.5 11/09/2024 06:01 AM    GLUCOSE 86 11/09/2024 06:01 AM    CALCIUM 7.4 11/09/2024 06:01 AM        Therapy progress:       PT    Supine to Sit: Substantial/maximal assistance  Sit to Supine:     Sit to Stand: Dependent  Chair/Bed to Chair Transfer: Dependent  Car Transfer:    Ambulation 10 ft:    Ambulation 50 ft:    Ambulation 150 ft:    Stairs - 1 Step:    Stairs - 4 Step:    Stairs - 12 Step:      OT    Eating: Supervision or touching assistance  Oral Hygiene:    Bathing:  Dependent  Upper Body Dressing: Supervision or touching assistance  Lower Body Dressing: Dependent  Toilet Transfer: Dependent  Toilet Hygiene: Dependent    Speech Therapy    Pt presents with functional cognitive linguistic skills, receptive and expressive language and motor speech. Pt with spastic movements but did not impact speech quality nor intelligibility. Pt with mild errors in short term (low context) memory tasks. He reported his anxiety often impacts his ability to attend or retain new information. Accuracy was improved with contextual or functional recall tasks. Pt declined any changes from his baseline and deferred offering for SLP intervention during his acute rehab stay. Will d/c SLP intervention at this time.    ADULT DIET; Regular  ADULT ORAL NUTRITION SUPPLEMENT; Breakfast, Lunch, Dinner; Standard High Calorie/High Protein Oral Supplement    Body mass index is 30.82 kg/m².    Assessment and Plan:    #. Critical Illness Myopathy/Polyneuropathy  - Due to septic shock requiring vasopressor support, with acute respiratory failure (due to ARDS, aspiration PNA) requiring mechanical ventilation and acute renal failure requiring intermittent hemodialysis   - Continue PT/OT  - Bracing/DME needs at discharge TBD  - Prevalon boots for heel protection  - Gabapentin started for neuropathic pain     #. Multifocal PNA  - Sepsis resolved  - Completing course of PO Levaquin, last dose 11/8     #. PE  - BLE Venous Duplex: negative for DVT  - Continue Eliquis      #. Recent ileus, resolved  - Tolerating diet, having bowel movements  - Continue bowel regimen  - Avoid narcotics     #. Hx of bilateral hip AVN  - Needs outpatient Ortho follow-up  - Multimodal pain management, as below     #. Lumbar spondylosis  - MRI L-Spine: Mild multilevel degenerative disease and facet arthrosis centered at L5-S1 with moderate spinal canal stenosis, moderate left and mild right neural foraminal narrowing at that level.   - Continue

## 2024-11-11 LAB
ANION GAP SERPL CALCULATED.3IONS-SCNC: 10 MMOL/L (ref 3–16)
ANISOCYTOSIS BLD QL SMEAR: ABNORMAL
BASOPHILS # BLD: 0 K/UL (ref 0–0.2)
BASOPHILS NFR BLD: 0 %
BUN SERPL-MCNC: 4 MG/DL (ref 7–20)
CALCIUM SERPL-MCNC: 7.4 MG/DL (ref 8.3–10.6)
CHLORIDE SERPL-SCNC: 107 MMOL/L (ref 99–110)
CO2 SERPL-SCNC: 21 MMOL/L (ref 21–32)
CREAT SERPL-MCNC: 0.4 MG/DL (ref 0.9–1.3)
DEPRECATED RDW RBC AUTO: 16.9 % (ref 12.4–15.4)
EOSINOPHIL # BLD: 0.2 K/UL (ref 0–0.6)
EOSINOPHIL NFR BLD: 5 %
FUNGUS BLD CULT: NORMAL
GFR SERPLBLD CREATININE-BSD FMLA CKD-EPI: >90 ML/MIN/{1.73_M2}
GLUCOSE SERPL-MCNC: 117 MG/DL (ref 70–99)
HCT VFR BLD AUTO: 27.2 % (ref 40.5–52.5)
HGB BLD-MCNC: 9 G/DL (ref 13.5–17.5)
LYMPHOCYTES # BLD: 0.5 K/UL (ref 1–5.1)
LYMPHOCYTES NFR BLD: 13 %
MCH RBC QN AUTO: 30.5 PG (ref 26–34)
MCHC RBC AUTO-ENTMCNC: 32.9 G/DL (ref 31–36)
MCV RBC AUTO: 92.7 FL (ref 80–100)
METAMYELOCYTES NFR BLD MANUAL: 2 %
MONOCYTES # BLD: 0.4 K/UL (ref 0–1.3)
MONOCYTES NFR BLD: 10 %
NEUTROPHILS # BLD: 3 K/UL (ref 1.7–7.7)
NEUTROPHILS NFR BLD: 70 %
PLATELET # BLD AUTO: 198 K/UL (ref 135–450)
PMV BLD AUTO: 7.9 FL (ref 5–10.5)
POTASSIUM SERPL-SCNC: 3.6 MMOL/L (ref 3.5–5.1)
RBC # BLD AUTO: 2.94 M/UL (ref 4.2–5.9)
SODIUM SERPL-SCNC: 138 MMOL/L (ref 136–145)
WBC # BLD AUTO: 4.2 K/UL (ref 4–11)

## 2024-11-11 PROCEDURE — 97110 THERAPEUTIC EXERCISES: CPT

## 2024-11-11 PROCEDURE — 97530 THERAPEUTIC ACTIVITIES: CPT

## 2024-11-11 PROCEDURE — 6370000000 HC RX 637 (ALT 250 FOR IP): Performed by: STUDENT IN AN ORGANIZED HEALTH CARE EDUCATION/TRAINING PROGRAM

## 2024-11-11 PROCEDURE — 1280000000 HC REHAB R&B

## 2024-11-11 PROCEDURE — 97116 GAIT TRAINING THERAPY: CPT

## 2024-11-11 PROCEDURE — 97535 SELF CARE MNGMENT TRAINING: CPT

## 2024-11-11 PROCEDURE — 6370000000 HC RX 637 (ALT 250 FOR IP): Performed by: PHYSICAL MEDICINE & REHABILITATION

## 2024-11-11 PROCEDURE — 94669 MECHANICAL CHEST WALL OSCILL: CPT

## 2024-11-11 PROCEDURE — 80048 BASIC METABOLIC PNL TOTAL CA: CPT

## 2024-11-11 PROCEDURE — 36415 COLL VENOUS BLD VENIPUNCTURE: CPT

## 2024-11-11 PROCEDURE — 85025 COMPLETE CBC W/AUTO DIFF WBC: CPT

## 2024-11-11 PROCEDURE — 82306 VITAMIN D 25 HYDROXY: CPT

## 2024-11-11 RX ADMIN — Medication 400 MG: at 08:47

## 2024-11-11 RX ADMIN — TRAMADOL HYDROCHLORIDE 50 MG: 50 TABLET ORAL at 12:27

## 2024-11-11 RX ADMIN — APIXABAN 5 MG: 5 TABLET, FILM COATED ORAL at 21:04

## 2024-11-11 RX ADMIN — TRAZODONE HYDROCHLORIDE 50 MG: 50 TABLET ORAL at 21:06

## 2024-11-11 RX ADMIN — BUSPIRONE HYDROCHLORIDE 10 MG: 5 TABLET ORAL at 21:04

## 2024-11-11 RX ADMIN — METHOCARBAMOL TABLETS 1500 MG: 750 TABLET, COATED ORAL at 08:46

## 2024-11-11 RX ADMIN — APIXABAN 5 MG: 5 TABLET, FILM COATED ORAL at 08:48

## 2024-11-11 RX ADMIN — VITAM B12 50 MCG: 100 TAB at 08:47

## 2024-11-11 RX ADMIN — THERA TABS 1 TABLET: TAB at 08:46

## 2024-11-11 RX ADMIN — BUSPIRONE HYDROCHLORIDE 10 MG: 5 TABLET ORAL at 12:26

## 2024-11-11 RX ADMIN — POLYETHYLENE GLYCOL 3350 17 G: 17 POWDER, FOR SOLUTION ORAL at 21:09

## 2024-11-11 RX ADMIN — BUSPIRONE HYDROCHLORIDE 10 MG: 5 TABLET ORAL at 08:48

## 2024-11-11 RX ADMIN — GABAPENTIN 200 MG: 100 CAPSULE ORAL at 21:03

## 2024-11-11 RX ADMIN — GABAPENTIN 200 MG: 100 CAPSULE ORAL at 08:48

## 2024-11-11 RX ADMIN — Medication 100 MG: at 08:47

## 2024-11-11 RX ADMIN — Medication: at 21:04

## 2024-11-11 RX ADMIN — Medication 50 MG: at 08:47

## 2024-11-11 RX ADMIN — VALSARTAN 80 MG: 80 TABLET, FILM COATED ORAL at 08:47

## 2024-11-11 RX ADMIN — ESCITALOPRAM OXALATE 20 MG: 10 TABLET ORAL at 05:25

## 2024-11-11 RX ADMIN — FOLIC ACID 1 MG: 1 TABLET ORAL at 08:48

## 2024-11-11 RX ADMIN — Medication 400 MG: at 21:04

## 2024-11-11 RX ADMIN — GABAPENTIN 200 MG: 100 CAPSULE ORAL at 12:25

## 2024-11-11 ASSESSMENT — PAIN DESCRIPTION - ORIENTATION: ORIENTATION: RIGHT;LEFT

## 2024-11-11 ASSESSMENT — PAIN SCALES - GENERAL
PAINLEVEL_OUTOF10: 5
PAINLEVEL_OUTOF10: 0

## 2024-11-11 ASSESSMENT — PAIN DESCRIPTION - LOCATION: LOCATION: LEG;FOOT

## 2024-11-11 NOTE — PROGRESS NOTES
[facility] Inpatient Rehabilitation Progress Note    Jm Snow  11/10/2024  [id number]    Chief Complaint: Critical illness myopathy    Subjective:   Patient states that he is feeling well and denies any new onset complaints.    ROS: Patient Denies Fevers/Chills, Nausea/vomiting, or Chest pain.     Objective:  Patient Vitals for the past 24 hrs:   BP Temp Temp src Pulse Resp SpO2 Weight   11/10/24 2045 99/67 97.5 °F (36.4 °C) Oral (!) 108 16 97 % --   11/10/24 0915 123/83 98 °F (36.7 °C) Oral (!) 115 16 96 % --   11/10/24 0538 -- -- -- -- -- -- 116.1 kg (256 lb)       Gen: No distress, pleasant.   HEENT: Normocephalic, atraumatic.   CV: Regular rate and rhythm.   Resp: No respiratory distress.   Abd: Soft, nontender   Ext: No edema.   Neuro: Alert, oriented, appropriately interactive.     Wt Readings from Last 3 Encounters:   11/10/24 116.1 kg (256 lb)   11/06/24 113.4 kg (250 lb)   10/30/24 112.8 kg (248 lb 10.9 oz)       Laboratory data:   Lab Results   Component Value Date    WBC 3.0 (L) 11/07/2024    HGB 8.2 (L) 11/07/2024    HCT 25.0 (L) 11/07/2024    MCV 90.9 11/07/2024     11/07/2024       Lab Results   Component Value Date/Time     11/10/2024 05:31 AM    K 3.9 11/10/2024 05:31 AM     11/10/2024 05:31 AM    CO2 21 11/10/2024 05:31 AM    BUN 4 11/10/2024 05:31 AM    CREATININE 0.4 11/10/2024 05:31 AM    GLUCOSE 89 11/10/2024 05:31 AM    CALCIUM 7.1 11/10/2024 05:31 AM        Therapy progress:       PT    Supine to Sit: Substantial/maximal assistance  Sit to Supine:     Sit to Stand: Dependent  Chair/Bed to Chair Transfer: Dependent  Car Transfer:    Ambulation 10 ft:    Ambulation 50 ft:    Ambulation 150 ft:    Stairs - 1 Step:    Stairs - 4 Step:    Stairs - 12 Step:      OT    Eating: Supervision or touching assistance  Oral Hygiene:    Bathing: Dependent  Upper Body Dressing: Supervision or touching assistance  Lower Body Dressing: Dependent  Toilet Transfer: Dependent  Toilet

## 2024-11-11 NOTE — PROGRESS NOTES
walker  Assistance: 2 person assistance with min/mod (A) of 2   Distance: 25 ft  Gait Mechanics: Guarded step to mechanics with shuffling pattern.  Reduced sandie with minimal (B) foot clearance and (B) knee extension throughout gait phase.  Comments:    Stair Mobility:  Stair mobility not completed on this date.  Comments:  Wheelchair Mobility:  No w/c mobility completed on this date.  Comments:  Balance:  Static Sitting Balance: fair (-): maintains balance at SBA with use of UE support  Dynamic Sitting Balance: fair (-): maintains balance at CGA with use of UE support  Static Standing Balance: poor (-): requires max (A) to maintain balance  Dynamic Standing Balance: poor (-): requires max (A) to maintain balance  Comments:    Other Therapeutic Interventions  1st Session:    See above functional mobility.  (B) LE PROM completed while seated in w/c, progressing to ~ 0* to 90* knee ROM, and lacking approximately 15* DF from neutral.  (B) LE tensoshape donned to assist in edema management.  In addition patient completes seated stepper L1 x 8 min to improve strength, ROM, and cardiovascular endurance.    2nd Session:    Pt supine in bed upon arrival, unchanged pain from first session, agreeable to PT session. Session begins with bed based mobility and brief/pericare change due to urinary incontinence.  Rolling completed (L)/(R) at mod (A).  Supine => sit transfer completed at mod (A) of 1 with increased time and HOB elevated.  Sit => stand transfer completed from elevated EOB at max (A) of 2.  Pt ambulates 10 ft x 2 completions within session using RW progressing from mod (A) of 2 to mod (A) of 1 with same mechanics as first session.  Toilet transfer completed with patient at max (A) of 2 with use of horizontal grab bar and RW.  Pt requires min (A) of 1 to maintain standing balance during dependent pericare and clothing management.  HEP reviewed for completion while up in recliner including: heel slides, LAQ,  marching, heel raises.  Upon return to room, pt remains up in recliner with chair alarm and call light within reach.  No new complaints following session.    Functional Outcomes                 Cognition  WFL  Comments: pt has anxiety, benefits from communicating plan ahead of time, responds well to education and explanation  Orientation:    alert and oriented x 4  Command Following:   WFL    Education  Barriers To Learning: emotional - anxiety  Patient Education: patient educated on goals, PT role and benefits, plan of care, general safety, functional mobility training, proper use of assistive device/equipment, transfer training, benefits of out of bed positioning on functional recovery  Learning Assessment:  patient verbalizes understanding, would benefit from continued reinforcement    Assessment  Activity Tolerance: fair; limited by global weakness, pain and ROM deficits  Impairments Requiring Therapeutic Intervention: decreased functional mobility, decreased ADL status, decreased ROM, decreased strength, decreased endurance, decreased sensation, decreased balance, decreased posture  Prognosis: good  Clinical Assessment: Patient presenting with significant functional improvement on this date including initiation of stand step transfers and short distance ambulation with use of RW.  Pt continues to have reduced (B) LE ROM and significant pain during functional mobility.  Despite improvements, patient continues to require extensive assist of 2 for all mobility tasks at this time.  Pt remains motivated to improve functional status to allow return to home environment.  Safety Interventions: patient left in chair, chair alarm in place, call light within reach, gait belt, patient at risk for falls, and nurse notified    Plan  Frequency: 5 x/week, 90 min/day  Current Treatment Recommendations: strengthening, ROM, balance training, functional mobility training, transfer training, gait training, stair training, endurance

## 2024-11-11 NOTE — PROGRESS NOTES
[facility] - Inpatient Rehabilitation Department   Phone: [phone]    Occupational Therapy    [] Initial Evaluation            [x] Daily Treatment Note         [] Discharge Summary      Patient: Jm Snow   : 1995   MRN: [id number]   Date of Service:  2024    Admitting Diagnosis:  Critical illness myopathy  Current Admission Summary:   Jm Snow is a 28 y.o. 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  He completed 7 days of empiric antibiotics.  Hospital course was complicated by severe TONYA acute renal failure due to ATN, requiring hemodialysis with now recovered renal function.  He additionally developed acute hypoxic respiratory failure secondary to ARDS and aspiration pneumonia requiring intubation on 10/12.  He was transferred to [facility] MICU on 10/12 for further management. Extubated on 10/18.  Hospital course was further complicated by encephalopathy with agitation and psychosis (episodes of hallucination), suspected multifactorial due to metabolic derangements, active infection and possible alcohol withdrawal, as well as ileus.      Developed tachycardia and fever shortly after ARU admission on 10/30, and was discharged acutely to internal medicine service in acute hospital for further workup and management early morning on 10/31.  He developed hypoxia, requiring intermittent high flow oxygen.  CTA chest showed left upper lobe PE, as well as bilateral pleural effusions and pneumonia.  He was restarted on broad-spectrum IV antibiotics, and ID was consulted. Blood cultures no growth to date. He is now completing course of PO Levaquin. He was started on heparin drip for PE, which has now been discontinued in favor of oral anticoagulation with    Hobbies: hanging out with cat, used to play soccer, football fan   Recent Falls: none     Available Assistance at Discharge: 24 hr physical assistance available, father or mother     Pt was experiencing leg pain beginning in July 2024 and requiring use of cane for mobility and intermittent assist for mobility at home. Pt was in outpatient PT at [facility] and reported some improvement with PT intervention. Pain and mobility deficits have worsened since July, requiring pt to move into parent's home. Prior to July, pt was IND with all mobility and ADLs, living alone in apartment.     Examination (11/8)  Observation:   General Observation:  on RA, IV access on BUE, tremors throughout- patient reporting due to anxiety    Sensation:   accurately detects gross touch to all extremities  Feet increased sensitivity to touch   Proprioception:    diminished proprioception in (B) LE  Coordination Testing:   Coordination and Movement Description: tremors, decreased speed  Heel to Girard: Impaired Bilaterally  Alternating Toe Tapping: Impaired Bilaterally    ROM:   (L) Shoulder: ~110     (R) Shoulder: ~110   (B) Elbow AROM WFL  (B) Wrist AROM WFL  Strength:   (B) UE strength grossly +4      Subjective  General: Pt met supine in bed upon arrival - pt pleasant and agreeable to therapy.   Pain: Patient does not rate upon questioning  Pain Interventions: therapy activities modified        Activities of Daily Living  Basic Activities of Daily Living  Dressing Comments: totalA to ander/doff socks   General Comments: totalA to ander/doff tensoshapes to B LE for edema management  Instrumental Activities of Daily Living  No IADL completed on this date.    Functional Mobility  Bed Mobility:  Supine to Sit: maximum assistance  Sit to Supine: maximum assistance  Scooting: maximum assistance  Comments: maxA for all bed mob w/ increased time; HOB elevated for sup>sit and HOB flat for sit>sup; use of therapist arm and bed rail to assist w/

## 2024-11-12 PROCEDURE — 97116 GAIT TRAINING THERAPY: CPT

## 2024-11-12 PROCEDURE — 97530 THERAPEUTIC ACTIVITIES: CPT

## 2024-11-12 PROCEDURE — 6370000000 HC RX 637 (ALT 250 FOR IP): Performed by: STUDENT IN AN ORGANIZED HEALTH CARE EDUCATION/TRAINING PROGRAM

## 2024-11-12 PROCEDURE — 97535 SELF CARE MNGMENT TRAINING: CPT

## 2024-11-12 PROCEDURE — 1280000000 HC REHAB R&B

## 2024-11-12 PROCEDURE — 94669 MECHANICAL CHEST WALL OSCILL: CPT

## 2024-11-12 RX ORDER — ERGOCALCIFEROL 1.25 MG/1
50000 CAPSULE, LIQUID FILLED ORAL WEEKLY
Status: DISCONTINUED | OUTPATIENT
Start: 2024-11-12 | End: 2024-11-22 | Stop reason: HOSPADM

## 2024-11-12 RX ORDER — GABAPENTIN 300 MG/1
300 CAPSULE ORAL 3 TIMES DAILY
Status: DISCONTINUED | OUTPATIENT
Start: 2024-11-12 | End: 2024-11-15

## 2024-11-12 RX ORDER — CALCIUM CARBONATE 500 MG/1
500 TABLET, CHEWABLE ORAL
Status: DISCONTINUED | OUTPATIENT
Start: 2024-11-12 | End: 2024-11-15

## 2024-11-12 RX ORDER — AMMONIUM LACTATE 12 G/100G
CREAM TOPICAL
Status: DISCONTINUED | OUTPATIENT
Start: 2024-11-12 | End: 2024-11-13

## 2024-11-12 RX ADMIN — THERA TABS 1 TABLET: TAB at 09:56

## 2024-11-12 RX ADMIN — ESCITALOPRAM OXALATE 20 MG: 10 TABLET ORAL at 05:59

## 2024-11-12 RX ADMIN — VALSARTAN 80 MG: 80 TABLET, FILM COATED ORAL at 09:56

## 2024-11-12 RX ADMIN — Medication 50 MG: at 09:56

## 2024-11-12 RX ADMIN — BUSPIRONE HYDROCHLORIDE 10 MG: 5 TABLET ORAL at 09:56

## 2024-11-12 RX ADMIN — Medication: at 09:59

## 2024-11-12 RX ADMIN — Medication: at 20:35

## 2024-11-12 RX ADMIN — Medication 400 MG: at 09:56

## 2024-11-12 RX ADMIN — FOLIC ACID 1 MG: 1 TABLET ORAL at 09:56

## 2024-11-12 RX ADMIN — GABAPENTIN 200 MG: 100 CAPSULE ORAL at 09:56

## 2024-11-12 RX ADMIN — TRAZODONE HYDROCHLORIDE 50 MG: 50 TABLET ORAL at 20:31

## 2024-11-12 RX ADMIN — APIXABAN 5 MG: 5 TABLET, FILM COATED ORAL at 09:56

## 2024-11-12 RX ADMIN — Medication 100 MG: at 09:56

## 2024-11-12 RX ADMIN — ACETAMINOPHEN 650 MG: 325 TABLET ORAL at 06:30

## 2024-11-12 RX ADMIN — POTASSIUM BICARBONATE 25 MEQ: 978 TABLET, EFFERVESCENT ORAL at 09:56

## 2024-11-12 RX ADMIN — BUSPIRONE HYDROCHLORIDE 10 MG: 5 TABLET ORAL at 15:13

## 2024-11-12 RX ADMIN — Medication 400 MG: at 20:31

## 2024-11-12 RX ADMIN — ANTACID TABLETS 500 MG: 500 TABLET, CHEWABLE ORAL at 15:13

## 2024-11-12 RX ADMIN — APIXABAN 5 MG: 5 TABLET, FILM COATED ORAL at 20:31

## 2024-11-12 RX ADMIN — ERGOCALCIFEROL 50000 UNITS: 1.25 CAPSULE ORAL at 15:16

## 2024-11-12 RX ADMIN — GABAPENTIN 300 MG: 300 CAPSULE ORAL at 20:19

## 2024-11-12 RX ADMIN — VITAM B12 50 MCG: 100 TAB at 09:56

## 2024-11-12 RX ADMIN — BUSPIRONE HYDROCHLORIDE 10 MG: 5 TABLET ORAL at 20:19

## 2024-11-12 RX ADMIN — GABAPENTIN 300 MG: 300 CAPSULE ORAL at 15:14

## 2024-11-12 RX ADMIN — POTASSIUM BICARBONATE 25 MEQ: 978 TABLET, EFFERVESCENT ORAL at 20:32

## 2024-11-12 RX ADMIN — ACETAMINOPHEN 650 MG: 325 TABLET ORAL at 15:46

## 2024-11-12 ASSESSMENT — PAIN SCALES - GENERAL
PAINLEVEL_OUTOF10: 6
PAINLEVEL_OUTOF10: 5
PAINLEVEL_OUTOF10: 4
PAINLEVEL_OUTOF10: 5

## 2024-11-12 ASSESSMENT — PAIN DESCRIPTION - DESCRIPTORS
DESCRIPTORS: ACHING
DESCRIPTORS: THROBBING;TIGHTNESS

## 2024-11-12 ASSESSMENT — PAIN DESCRIPTION - LOCATION
LOCATION: FOOT
LOCATION: LEG;KNEE
LOCATION: ABDOMEN;BACK;OTHER (COMMENT)

## 2024-11-12 ASSESSMENT — PAIN DESCRIPTION - ORIENTATION
ORIENTATION: RIGHT;LEFT
ORIENTATION: RIGHT;LEFT

## 2024-11-12 NOTE — CARE COORDINATION
Team conference/Weekly Summary         Team conference held today.  Patient's discharge date is 11/23/2024.  Patient was living independently before hospital admission but will be living with parents when discharged from ARU.  Met with pt and pt's mom during team conference today.  Patient is hopeful to return home from ARU.  Mom stated that both her and pt's dad work full time, but pt's brother will be home during the day to help patient if needed.  Mom's goal is for patient to be independent enough to get to/from the bathroom independently and take care of toileting independently.  SW will continue to follow for needs.  If patient does discharge home, [facility] already accepted and is following.    Electronically signed by ARSEN Zambrano LSW on 11/12/2024 at 1:46 PM

## 2024-11-12 NOTE — PROGRESS NOTES
Nutrition Note    RECOMMENDATIONS  PO Diet: Regular   ONS: Continue Ensure plus HP (NO VANILLA)    ASSESSMENT   Nutrition f/u. Receives a regular diet w/ Ensure plus HP TID. Pt reports to consume a little over half of meals, with one meal documented at % in EMR. Reports family will bring in outside food that he will eat instead. Observed candy and bananas around room. Reports liking and drinking Ensure plus High protein. Note pt wt has been trending up ~ 5 lb since admission. No nutrition concerns at this time. Will continue to monitor for sustained adequate PO intake and ONS consumption.       Malnutrition Status: Mild malnutrition (nutrition status/PO intake improving)  Acute Illness  Findings of the 6 clinical characteristics of malnutrition:  Energy Intake:  Mild decrease in energy intake  Weight Loss:  Unable to assess (no wt loss in EMR)     Body Fat Loss:  No body fat loss     Muscle Mass Loss:  No muscle mass loss    Fluid Accumulation:  Moderate to Severe Extremities   Strength:  Not Performed      NUTRITION DIAGNOSIS   Increased nutrient needs related to increase demand for energy/nutrients as evidenced by rehab for strength and conditioning    Goals: PO intake 75% or greater, by next RD assessment     NUTRITION RELATED FINDINGS  Objective: LBM 11/11. Edema: pitting +3 BLE, non-pitting BUE  Wounds: Deep Tissue Injury, Unstageable    CURRENT NUTRITION THERAPIES  ADULT DIET; Regular  ADULT ORAL NUTRITION SUPPLEMENT; Breakfast, Lunch, Dinner; Standard High Calorie/High Protein Oral Supplement       PO Intake: 51-75% (per pt)   PO Supplement Intake:% (per pt)    ANTHROPOMETRICS  Current Height: 193 cm (6' 3.98\")  Current Weight - Scale: 116.7 kg (257 lb 3.2 oz)    Admission weight: 114.8 kg (253 lb 1.4 oz)  Ideal Body Weight (IBW): 202 lbs  (92 kg)    Usual Bodyweight 127 kg (280 lb)   BMI: 31.3      COMPARATIVE STANDARDS  Total Energy Requirements (kcals/day): 2142-2944     Protein (g):

## 2024-11-12 NOTE — PROGRESS NOTES
[facility] - Inpatient Rehabilitation Department   Phone: [phone]    Occupational Therapy    [] Initial Evaluation            [x] Daily Treatment Note         [] Discharge Summary      Patient: Jm Snow   : 1995   MRN: [id number]   Date of Service:  2024    Admitting Diagnosis:  Critical illness myopathy  Current Admission Summary:   Jm Snow is a 28 y.o. 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  He completed 7 days of empiric antibiotics.  Hospital course was complicated by severe TONYA acute renal failure due to ATN, requiring hemodialysis with now recovered renal function.  He additionally developed acute hypoxic respiratory failure secondary to ARDS and aspiration pneumonia requiring intubation on 10/12.  He was transferred to [facility] MICU on 10/12 for further management. Extubated on 10/18.  Hospital course was further complicated by encephalopathy with agitation and psychosis (episodes of hallucination), suspected multifactorial due to metabolic derangements, active infection and possible alcohol withdrawal, as well as ileus.      Developed tachycardia and fever shortly after ARU admission on 10/30, and was discharged acutely to internal medicine service in acute hospital for further workup and management early morning on 10/31.  He developed hypoxia, requiring intermittent high flow oxygen.  CTA chest showed left upper lobe PE, as well as bilateral pleural effusions and pneumonia.  He was restarted on broad-spectrum IV antibiotics, and ID was consulted. Blood cultures no growth to date. He is now completing course of PO Levaquin. He was started on heparin drip for PE, which has now been discontinued in favor of oral anticoagulation with  in: 2-3 weeks   Patient will complete upper body ADL at modified independent   Patient will complete lower body ADL at modified independent   Patient will complete toileting at modified independent   Patient will complete functional transfers at modified independent     Above goals reviewed on 11/12/2024.  All goals are ongoing at this time unless indicated above.       Therapy Session Time     Individual Group Co-treatment   Time In   0730   Time Out   0830   Minutes   60      Second Session Therapy Time:   Individual Concurrent Group Co-treatment   Time In    1300   Time Out    1340   Minutes    40     Timed Code Treatment Minutes: 60+40 minutes  Total Treatment Minutes: 100 minutes        Electronically Signed By: CARLY Pak MOT OTR/L, [id number] 11/12/2024 3:02 PM

## 2024-11-12 NOTE — CARE COORDINATION
Case Management Assessment  Initial Evaluation    Date/Time of Evaluation: 11/12/2024 1:44 PM  Assessment Completed by: ARSEN Zambrano, AVRIL    If patient is discharged prior to next notation, then this note serves as note for discharge by case management.    Patient Name: Jm Snow                   YOB: 1995  Diagnosis: Critical illness myopathy [G72.81]                   Date / Time: 11/7/2024  6:30 PM    Patient Admission Status: REHAB IP   Readmission Risk (Low < 19, Mod (19-27), High > 27): Readmission Risk Score: 23.6    Current PCP: No primary care provider on file.  PCP verified by CM? No    Chart Reviewed: Yes      History Provided by: Patient, Child/Family  Patient Orientation: Alert and Oriented    Patient Cognition: Alert    Hospitalization in the last 30 days (Readmission):  No    If yes, Readmission Assessment in CM Navigator will be completed.    Advance Directives:      Code Status: Full Code   Patient's Primary Decision Maker is:        Discharge Planning:    Patient lives with: Family Members, Parent (will be living w/mom, dad and brother) Type of Home: House (2 level - 1 step)  Primary Care Giver: Self  Patient Support Systems include: Parent, Family Members   Current Financial resources: None  Current community resources: None  Current services prior to admission: Durable Medical Equipment            Current DME: Other (Comment) (quad cane)            Type of Home Care services:  None    ADLS  Prior functional level: Independent in ADLs/IADLs  Current functional level: Mobility    PT AM-PAC:   /24  OT AM-PAC:   /24    Family can provide assistance at DC: Yes (see note)  Would you like Case Management to discuss the discharge plan with any other family members/significant others, and if so, who? Yes (w/mom and dad)  Plans to Return to Present Housing: Unknown at present  Other Identified Issues/Barriers to RETURNING to current housing: None  Potential Assistance needed at  Agree with the Discharge Plan? Yes    ARSEN Zambrano, LSW  Case Management Department    Electronically signed by ARSEN Zambrano, CONNIEW on 11/12/2024 at 1:46 PM

## 2024-11-12 NOTE — PATIENT CARE CONFERENCE
[facility] Inpatient Rehabilitation Department  Weekly Team Conference Note    Patient Name: Jm Snow      MRN: [id number]  : 1995  (29 y.o.) Gender: male     The team conference for this patient was held on 24 at 1100 am and was led by:  Chinedu Rosas MD     CASE MANAGEMENT:  Assessment: Patient's discharge date is 2024.  Patient was living independently before hospital admission but will be living with parents when discharged from ARU.  Met with pt and pt's mom during team conference today.  Patient is hopeful to return home from ARU.  Mom stated that both her and pt's dad work full time, but pt's brother will be home during the day to help patient if needed.  Mom's goal is for patient to be independent enough to get to/from the bathroom independently and take care of toileting independently.  SW will continue to follow for needs.  If patient does discharge home, [facility] already accepted and is following.    PHYSICAL THERAPY    Current Status:  Bed Mobility:  Supine to Sit: maximum assistance  Sit to Supine: maximum assistance  Scooting: minimal assistance  Comments: HOB was partially elevated, increased time to complete.  Improved ability to reposition (B) LE while supine.  Transfers:  Sit to stand transfer: 2 person assistance with max (A) of 2 including completion within stedy, // bars, and to RW   Stand to sit transfer: 2 person assistance with max (A) of 2   Stand step transfer: 2 person assistance with max (A) of 2 with use of RW including transition from w/c <=> stepper   Comments: In addition to stand step transfers completed within session, patient completes bed <=> w/c with use of stedy at max (A) of 2 with pillow to shins for improved comfort.       Ambulation:  Surface:level surface  Assistive Device: rolling walker  Assistance: 2 person assistance with min/mod (A) of 2   Distance: 25 ft  Gait Mechanics: Guarded step to mechanics with    Toileting: dependent.    Toileting Comments: external catheter on upon arrival. Disconnected for shower/mobility. Patient voided medium loose BM during and after shower while seated on BSC. Total A for pericare while in stance in stedy. Requesting external catheter at end of session due to difficulty being able to use urinal due to body habitus.   General Comments: patient tolerated shower well. HR increased to 130s after shower. Patient will benefit from education on AE      Transfers:  Shower/toilet transfer transfer: maxA of 2   Shower/toilet transfer comments: to/from bariatric C    Goals:             Short Term Goals:  Time Frame: 1-2 weeks   Patient will complete lower body ADL at moderate assistance   Patient will complete toileting at moderate assistance   Patient will complete functional transfers at moderate assistance   Patient to maintain standing at contact guard assistance for 3 minutes.  Long Term Goals:  To be completed in: 2-3 weeks   Patient will complete upper body ADL at modified independent   Patient will complete lower body ADL at modified independent   Patient will complete toileting at modified independent   Patient will complete functional transfers at modified independent     Above goals reviewed today.  All goals are ongoing at this time unless indicated above.     These goals were reviewed with this patient at the time of assessment and Jm Snow is in agreement.    Plan of Care:  Pt to be seen 5 out of 7 days per week per ARU protocol ( 90 minutes with OT)    NUTRITION:  Weight - Scale: 116.2 kg (256 lb 1.6 oz) / Body mass index is 31.19 kg/m².    Diet:ADULT DIET; Regular  Supplements: ADULT ORAL NUTRITION SUPPLEMENT; Breakfast, Lunch, Dinner; Standard High Calorie/High Protein Oral Supplement    PO intake improving, pt reports eating ~50% at each meal.  Pt consuming Ensure Plus High Protein.  Please see nutrition note for details.    NURSING:    Risk for Readmission:  24%    Avalos Fall Risk Score: 45  Wounds/Incisions/Ulcers: Wound on left great toe/ abrasion on groin  Medication Review: Daily with patient  Pain: Patient satisfied  Consultations/Labs/X-rays: BMP & CBC on MWF    Patient/Family Education provided by team:    Discharge Plan   Estimated Length of Stay:  11 Days  Destination: TBD pending progress  Pass:No  Services at Discharge: TBD pending pt progress  Equipment at Discharge: TBD pending pt progress    Patient Goals:  \"I just want to get home so I can walk a little and get around in my wheelchair if I need to.\",  \"to be able to get up and walk\".     Interdisciplinary Individualized Plan of Care Review of Previous Week:    Medical and functional progress towards goals:  Medically the patient is doing ok, has had electrolyte derangements, on magnesium supplement, now stable, on trazadone due to difficulty sleeping.  Pt is making slow progress w/ therapy and working hard each day. Con't to require maxA x2 for all functional transfers including w/ stedy or RW. Able to amb at RW level for first time this date 25' w/ 2 person assistance.  Pt requires 2 person assistance for LB ADLs at this time and will benefit from AE for LB ADLs at d/c.   Factors facilitating achievement of goals: pleasant and motivated  Barriers to achievement of goals: Predominately limited by global weakness, decreased endurance, ROM impairments in (B) LE, impaired balance, and anxiety and pain  Interventions to address Barriers: interventions targeting dynamic balance, ROM of B LE's, activity tolerance, global strengthening, transfer training, gait training  Goals still appropriate:  Yes  Modifications to goals: None  Continue Current Plan of Care:  Yes  Modifications to Plan of Care: None    Rehab Team Members in attendance for Team Conference:  Porfirio Patiño, MSW, LSW    Samaria Valladares, RD, LD    Eryn Anthony, OTR/L    Chuy Wang, PT, DPT    Abimbola Flaherty, POOJA (Charge RN)    Eliane Rincon

## 2024-11-12 NOTE — PROGRESS NOTES
[facility] - Inpatient Rehabilitation Department   Phone: [phone]    Physical Therapy    [] Initial Evaluation            [x] Daily Treatment Note         [] Discharge Summary      Patient: Jm Snow   : 1995   MRN: [id number]   Date of Service:  2024  Admitting Diagnosis: Critical illness myopathy  Current Admission Summary: Jm Snow is a 29 y.o. 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  He completed 7 days of empiric antibiotics.  Hospital course was complicated by severe TONYA acute renal failure due to ATN, requiring hemodialysis with now recovered renal function.  He additionally developed acute hypoxic respiratory failure secondary to ARDS and aspiration pneumonia requiring intubation on 10/12.  He was transferred to [facility] MICU on 10/12 for further management. Extubated on 10/18.  Hospital course was further complicated by encephalopathy with agitation and psychosis (episodes of hallucination), suspected multifactorial due to metabolic derangements, active infection and possible alcohol withdrawal, as well as ileus.      Developed tachycardia and fever shortly after ARU admission on 10/30, and was discharged acutely to internal medicine service in acute hospital for further workup and management early morning on 10/31.  He developed hypoxia, requiring intermittent high flow oxygen.  CTA chest showed left upper lobe PE, as well as bilateral pleural effusions and pneumonia.  He was restarted on broad-spectrum IV antibiotics, and ID was consulted. Blood cultures no growth to date. He is now completing course of PO Levaquin. He was started on heparin drip for PE, which has now been discontinued in favor of oral anticoagulation with Eliquis.  GI  motivated to improve functional status to allow return to home environment.  Safety Interventions: patient left in chair, chair alarm in place, call light within reach, gait belt, patient at risk for falls, and nurse notified    Plan  Frequency: 5 x/week, 90 min/day  Current Treatment Recommendations: strengthening, ROM, balance training, functional mobility training, transfer training, gait training, stair training, endurance training, neuromuscular re-education, wheelchair mobility training, and equipment evaluation/education    Goals  Patient Goals: \"I just want to get home so I can walk a little and get around in my wheelchair if I need to.\"   Short Term Goals:  Time Frame: 14 days  Patient will complete bed mobility at contact guard assistance   Patient will complete transfers at minimal assistance   Patient will ambulate 50 ft with use of rolling walker at minimal assistance  Patient will ascend/descend 1 stairs without use of HR at maximum assistance  Patient will complete car transfer at minimal assistance  Patient will complete manual w/c propulsion 150 ft at modified independent  Patient to maintain standing at minimal assistance for 5 minutes.    Above goals reviewed on 11/12/2024.  All goals are ongoing at this time unless indicated above.      Therapy Session Time      Individual Group Co-treatment   Time In      0730   Time Out      0830   Minutes      60       Second Session Therapy Time:   Individual Group Co-treatment   Time In    1300   Time Out    1340    Minutes    40        Total Treatment Minutes:  100 Minutes     Electronically Signed By: Chuy Wang PT

## 2024-11-12 NOTE — PROGRESS NOTES
Jm Snow  11/12/2024  [id number]    Chief Complaint: Critical illness myopathy    Subjective    No acute events overnight.     Slept well. Using PRN trazodone most every night. Appetite is adequate. Continues to have significant pain in his bilateral lower extremities. Also reports aching muscular pain in his hips and thighs bilaterally. Used PRN Tramadol 50 mg x1 yesterday. Denies fevers/chills, chest pain, dyspnea, abdominal pain.     Last Bowel Movement:  11/11/24        Objective    Patient Vitals for the past 24 hrs:   BP Temp Temp src Pulse Resp SpO2 Weight   11/12/24 0600 -- -- -- -- -- -- 116.7 kg (257 lb 3.2 oz)   11/11/24 2045 129/77 97.9 °F (36.6 °C) Oral (!) 116 18 95 % --     Gen: No distress, pleasant.   HEENT: Normocephalic, atraumatic.   CV: Regular rate and rhythm. Extremities warm, well perfused.   Resp: No respiratory distress. CTAB.  Abd: Soft, nontender.  Ext: No edema.   Skin: Lacy red rash involving left sided abdomen and left thigh, appears to be following skin fissures.   Neuro: Alert, oriented, appropriately interactive.     Laboratory data: Available via EMR.     Therapy progress:       PT    Supine to Sit: Substantial/maximal assistance  Sit to Supine: Substantial/maximal assistance   Sit to Stand: Dependent  Chair/Bed to Chair Transfer: Dependent  Car Transfer:    Ambulation 10 ft: Dependent  Ambulation 50 ft:    Ambulation 150 ft:    Stairs - 1 Step:    Stairs - 4 Step:    Stairs - 12 Step:      OT    Eating: Supervision or touching assistance  Oral Hygiene:    Bathing: Dependent  Upper Body Dressing: Supervision or touching assistance  Lower Body Dressing: Dependent  Toilet Transfer: Dependent  Toilet Hygiene: Dependent    Speech Therapy    Pt presents with functional cognitive linguistic skills, receptive and expressive language and motor speech. Pt with spastic movements but did not impact speech quality nor intelligibility. Pt with mild errors in short term (low context)  memory tasks. He reported his anxiety often impacts his ability to attend or retain new information. Accuracy was improved with contextual or functional recall tasks. Pt declined any changes from his baseline and deferred offering for SLP intervention during his acute rehab stay. Will d/c SLP intervention at this time.    Body mass index is 31.32 kg/m².        Assessment/Plan:  Functional progress: Ambulating 15' modA  This patient continues to require an ARU level of care from all disciplines to address the following issues:    #. Critical Illness Myopathy/Polyneuropathy  - Due to septic shock requiring vasopressor support, with acute respiratory failure (due to ARDS, aspiration PNA) requiring mechanical ventilation and acute renal failure requiring intermittent hemodialysis   - Continue PT/OT  - Bracing/DME needs at discharge TBD  - Prevalon boots for heel protection  - Gabapentin started for neuropathic pain     #. Multifocal PNA  - Sepsis resolved  - Completed course of PO Levaquin, last dose 11/8     #. PE  - BLE Venous Duplex: negative for DVT  - Continue Eliquis      #. Recent ileus, resolved  - Tolerating diet, having bowel movements  - Continue bowel regimen  - Avoid narcotics     #. Hx of bilateral hip AVN  - Needs outpatient Ortho follow-up  - Multimodal pain management, as below     #. Lumbar spondylosis  - MRI L-Spine: Mild multilevel degenerative disease and facet arthrosis centered at L5-S1 with moderate spinal canal stenosis, moderate left and mild right neural foraminal narrowing at that level.   - Continue PT  - Pain management as below     #. EtOH use disorder  - Out of withdrawal window  - Continue thiamine, multivitamin  - Counseling/education, community resources at discharge     #. Hypothyroidism  - TSH 12.3, T4 1.1 on 10/31  - Recheck as outpatient     #. HTN  - Continue home valsartan 80 mg daily     #. Anxiety  - Continue escitalopram 20 mg daily, and buspirone (increased to 10 TID on 11/9)       #. Left hallux and 4th toe skin necrosis  - Due to vasopressor induced ischemic injury  - Podiatry evaluated, recommended betadine paint daily.      #. FEN  - Hypomagnesemia, continue PO repletion  - Hypokalemia, continue PO repletion  - Hypocalcemia, check Vit D levels, start PO calcium and vitamin D repletion  - Continue zinc repletion  - Continue B12 repletion     #. Xerosis  - Lac-hydrin PRN    #. TONYA, resolved  - Required intermittent HD at [facility]    CODE: Full Code  Diet: ADULT DIET; Regular  ADULT ORAL NUTRITION SUPPLEMENT; Breakfast, Lunch, Dinner; Standard High Calorie/High Protein Oral Supplement  Bowels: Per protocol  Bladder: Per protocol   Sleep: Melatonin 3 mg nightly PRN, Trazodone 50 mg nightly PRN  Pain: Tylenol 650 mg q4h PRN, lidocaine patch PRN for back pain, gabapentin 300 mg TID, Tramadol  mg q6h PRN  DVT PPx: Fully anticoagulated   Follow-ups: Hematology, Podiatry, PCP  ELOS: 16 days      Interdisciplinary team conference was held today with entire rehab treatment team including PT, OT, SLP (if applicable), Dietician, RN, and SW. Discussion focused on progress toward rehab goals and discharge planning. Making progress. Barriers include weakness, level of assist, pain. We as a medical team, and I as the physician , made a plan to work on these barriers to facilitate safe discharge. Plan will be presented to patient/family (if available).      Chinedu Rosas MD 11/12/2024, 8:36 AM    * This document was created using dictation software.  While all precautions were taken to ensure accuracy, errors may have occurred.  Please disregard any typographical errors.

## 2024-11-13 LAB
25(OH)D3 SERPL-MCNC: 7.3 NG/ML
ANION GAP SERPL CALCULATED.3IONS-SCNC: 12 MMOL/L (ref 3–16)
BUN SERPL-MCNC: 4 MG/DL (ref 7–20)
CA-I BLD-SCNC: 1.05 MMOL/L (ref 1.12–1.32)
CALCIUM SERPL-MCNC: 7.8 MG/DL (ref 8.3–10.6)
CHLORIDE SERPL-SCNC: 110 MMOL/L (ref 99–110)
CO2 SERPL-SCNC: 21 MMOL/L (ref 21–32)
CREAT SERPL-MCNC: 0.4 MG/DL (ref 0.9–1.3)
GFR SERPLBLD CREATININE-BSD FMLA CKD-EPI: >90 ML/MIN/{1.73_M2}
GLUCOSE SERPL-MCNC: 78 MG/DL (ref 70–99)
PH BLDV: 7.42 [PH] (ref 7.35–7.45)
POTASSIUM SERPL-SCNC: 3.8 MMOL/L (ref 3.5–5.1)
SODIUM SERPL-SCNC: 143 MMOL/L (ref 136–145)

## 2024-11-13 PROCEDURE — 6370000000 HC RX 637 (ALT 250 FOR IP): Performed by: STUDENT IN AN ORGANIZED HEALTH CARE EDUCATION/TRAINING PROGRAM

## 2024-11-13 PROCEDURE — 97110 THERAPEUTIC EXERCISES: CPT

## 2024-11-13 PROCEDURE — 82330 ASSAY OF CALCIUM: CPT

## 2024-11-13 PROCEDURE — 1280000000 HC REHAB R&B

## 2024-11-13 PROCEDURE — 80048 BASIC METABOLIC PNL TOTAL CA: CPT

## 2024-11-13 PROCEDURE — 97530 THERAPEUTIC ACTIVITIES: CPT

## 2024-11-13 PROCEDURE — 97535 SELF CARE MNGMENT TRAINING: CPT

## 2024-11-13 RX ORDER — LANOLIN ALCOHOL/MO/W.PET/CERES
400 CREAM (GRAM) TOPICAL DAILY
Status: DISCONTINUED | OUTPATIENT
Start: 2024-11-14 | End: 2024-11-15

## 2024-11-13 RX ORDER — MINERAL OIL/HYDROPHIL PETROLAT
OINTMENT (GRAM) TOPICAL 2 TIMES DAILY PRN
Status: DISCONTINUED | OUTPATIENT
Start: 2024-11-13 | End: 2024-11-22 | Stop reason: HOSPADM

## 2024-11-13 RX ADMIN — APIXABAN 5 MG: 5 TABLET, FILM COATED ORAL at 08:04

## 2024-11-13 RX ADMIN — GABAPENTIN 300 MG: 300 CAPSULE ORAL at 08:05

## 2024-11-13 RX ADMIN — BUSPIRONE HYDROCHLORIDE 10 MG: 5 TABLET ORAL at 08:04

## 2024-11-13 RX ADMIN — Medication 50 MG: at 08:04

## 2024-11-13 RX ADMIN — FOLIC ACID 1 MG: 1 TABLET ORAL at 08:04

## 2024-11-13 RX ADMIN — THERA TABS 1 TABLET: TAB at 08:05

## 2024-11-13 RX ADMIN — POLYETHYLENE GLYCOL 3350 17 G: 17 POWDER, FOR SOLUTION ORAL at 20:50

## 2024-11-13 RX ADMIN — BUSPIRONE HYDROCHLORIDE 10 MG: 5 TABLET ORAL at 13:08

## 2024-11-13 RX ADMIN — ANTACID TABLETS 500 MG: 500 TABLET, CHEWABLE ORAL at 12:24

## 2024-11-13 RX ADMIN — POTASSIUM BICARBONATE 25 MEQ: 978 TABLET, EFFERVESCENT ORAL at 08:07

## 2024-11-13 RX ADMIN — ACETAMINOPHEN 650 MG: 325 TABLET ORAL at 00:28

## 2024-11-13 RX ADMIN — Medication 400 MG: at 08:05

## 2024-11-13 RX ADMIN — ANTACID TABLETS 500 MG: 500 TABLET, CHEWABLE ORAL at 17:40

## 2024-11-13 RX ADMIN — ACETAMINOPHEN 650 MG: 325 TABLET ORAL at 17:58

## 2024-11-13 RX ADMIN — TRAZODONE HYDROCHLORIDE 50 MG: 50 TABLET ORAL at 20:50

## 2024-11-13 RX ADMIN — Medication: at 08:08

## 2024-11-13 RX ADMIN — ACETAMINOPHEN 650 MG: 325 TABLET ORAL at 08:05

## 2024-11-13 RX ADMIN — ESCITALOPRAM OXALATE 20 MG: 10 TABLET ORAL at 05:41

## 2024-11-13 RX ADMIN — POLYETHYLENE GLYCOL 3350 17 G: 17 POWDER, FOR SOLUTION ORAL at 08:05

## 2024-11-13 RX ADMIN — BUSPIRONE HYDROCHLORIDE 10 MG: 5 TABLET ORAL at 20:50

## 2024-11-13 RX ADMIN — APIXABAN 5 MG: 5 TABLET, FILM COATED ORAL at 20:50

## 2024-11-13 RX ADMIN — GABAPENTIN 300 MG: 300 CAPSULE ORAL at 20:50

## 2024-11-13 RX ADMIN — GABAPENTIN 300 MG: 300 CAPSULE ORAL at 13:08

## 2024-11-13 RX ADMIN — VITAM B12 50 MCG: 100 TAB at 08:05

## 2024-11-13 RX ADMIN — VALSARTAN 80 MG: 80 TABLET, FILM COATED ORAL at 08:04

## 2024-11-13 RX ADMIN — ANTACID TABLETS 500 MG: 500 TABLET, CHEWABLE ORAL at 08:04

## 2024-11-13 RX ADMIN — Medication: at 10:52

## 2024-11-13 RX ADMIN — Medication 100 MG: at 08:05

## 2024-11-13 ASSESSMENT — PAIN SCALES - GENERAL
PAINLEVEL_OUTOF10: 6
PAINLEVEL_OUTOF10: 2
PAINLEVEL_OUTOF10: 0
PAINLEVEL_OUTOF10: 2
PAINLEVEL_OUTOF10: 5
PAINLEVEL_OUTOF10: 5

## 2024-11-13 ASSESSMENT — PAIN SCALES - WONG BAKER: WONGBAKER_NUMERICALRESPONSE: NO HURT

## 2024-11-13 ASSESSMENT — PAIN - FUNCTIONAL ASSESSMENT
PAIN_FUNCTIONAL_ASSESSMENT: ACTIVITIES ARE NOT PREVENTED

## 2024-11-13 ASSESSMENT — PAIN DESCRIPTION - ORIENTATION
ORIENTATION: RIGHT;LEFT
ORIENTATION: RIGHT;LEFT

## 2024-11-13 ASSESSMENT — PAIN DESCRIPTION - DESCRIPTORS
DESCRIPTORS: THROBBING
DESCRIPTORS: THROBBING
DESCRIPTORS: ACHING

## 2024-11-13 ASSESSMENT — PAIN DESCRIPTION - LOCATION
LOCATION: LEG

## 2024-11-13 NOTE — PROGRESS NOTES
[facility] - Inpatient Rehabilitation Department   Phone: [phone]    Physical Therapy    [] Initial Evaluation            [x] Daily Treatment Note         [] Discharge Summary      Patient: Jm Snow   : 1995   MRN: [id number]   Date of Service:  2024  Admitting Diagnosis: Critical illness myopathy  Current Admission Summary: Jm Snow is a 29 y.o. male with PMHx notable for EtOH use disorder, anxiety and HTN who [place] [facility] [place]  with sepsis on , found to have fungenemia of unclear source, possibly secondary to bilateral hip AVN with septic arthritis (however, left hip aspirate cultures with no growth).  He completed 7 days of empiric antibiotics.  Hospital course was complicated by severe TONYA acute renal failure due to ATN, requiring hemodialysis with now recovered renal function.  He additionally developed acute hypoxic respiratory failure secondary to ARDS and aspiration pneumonia requiring intubation on 10/12.  He was transferred to [facility] MICU on 10/12 for further management. Extubated on 10/18.  Hospital course was further complicated by encephalopathy with agitation and psychosis (episodes of hallucination), suspected multifactorial due to metabolic derangements, active infection and possible alcohol withdrawal, as well as ileus.      Developed tachycardia and fever shortly after ARU admission on 10/30, and was discharged acutely to internal medicine service in acute hospital for further workup and management early morning on 10/31.  He developed hypoxia, requiring intermittent high flow oxygen.  CTA chest showed left upper lobe PE, as well as bilateral pleural effusions and pneumonia.  He was restarted on broad-spectrum IV antibiotics, and ID was consulted. Blood cultures no growth to date. He is now completing course of PO Levaquin. He was started on heparin drip for PE, which has now been discontinued in favor of oral anticoagulation with Eliquis.  GI

## 2024-11-13 NOTE — PROGRESS NOTES
[facility] - Inpatient Rehabilitation Department   Phone: [phone]    Occupational Therapy    [] Initial Evaluation            [x] Daily Treatment Note         [] Discharge Summary      Patient: Jm Snow   : 1995   MRN: [id number]   Date of Service:  2024    Admitting Diagnosis:  Critical illness myopathy  Current Admission Summary:   Jm Snow is a 28 y.o. male with PMHx notable for EtOH use disorder, anxiety and HTN who [place] [facility] [place] , found to have fungenemia of unclear source, possibly secondary to bilateral hip AVN with septic arthritis (however, left hip aspirate cultures with no growth).  He completed 7 days of empiric antibiotics.  Hospital course was complicated by severe TONYA acute renal failure due to ATN, requiring hemodialysis with now recovered renal function.  He additionally developed acute hypoxic respiratory failure secondary to ARDS and aspiration pneumonia requiring intubation on 10/12.  He was transferred to [facility] MICU on 10/12 for further management. Extubated on 10/18.  Hospital course was further complicated by encephalopathy with agitation and psychosis (episodes of hallucination), suspected multifactorial due to metabolic derangements, active infection and possible alcohol withdrawal, as well as ileus.      Developed tachycardia and fever shortly after ARU admission on 10/30, and was discharged acutely to internal medicine service in acute hospital for further workup and management early morning on 10/31.  He developed hypoxia, requiring intermittent high flow oxygen.  CTA chest showed left upper lobe PE, as well as bilateral pleural effusions and pneumonia.  He was restarted on broad-spectrum IV antibiotics, and ID was consulted. Blood cultures no growth to date. He is now completing course of PO Levaquin. He was started on heparin drip for PE, which has now been discontinued in favor of oral anticoagulation with  Eliquis.  GI was consulted for mild ileus, managed with NG tube decompression.  Podiatry was consulted for lesions of the left hallux and fourth toe, felt to be eschar from pressor-induced tissue necrosis.  Past Medical History:  has a past medical history of Acute anxiety, Acute HFrEF (heart failure with reduced ejection fraction) ([facility]), TONYA (acute kidney injury) ([facility]), Anasarca, ARDS (adult respiratory distress syndrome), ATN (acute tubular necrosis) ([facility]), Constipation, ETOH abuse, Fungemia, Ileus ([facility]), Prolonged Q-T interval on ECG, and Septic shock ([facility]).  Past Surgical History:  has no past surgical history on file.    Discharge Recommendations: Pending progress     DME Required For Discharge: DME to be determined pending patient progress    Precautions/Restrictions: high fall risk  Weight Bearing Restrictions: no restrictions  Required Braces/Orthotics: no braces required  Positional Restrictions:no positional restrictions    Pre-Admission Information   Lives With: family (pt no longer has his own apartment and plans to live with parents at d/c)                 Type of Home: house  Home Layout: two level, laundry in basement, bedroom/bathroom upstairs, able to live on main level (plans for this until he can do stairs), 12 stairs to 2nd level with L and half on R HR  Home Access:  1 step to enter without rails from garage  Bathroom Layout: tub/shower unit  Bathroom Equipment:  none  Toilet Height: standard height  Home Equipment: quad cane, Uses in the right hand   Transfer Assistance: Independent without use of device  Ambulation Assistance:Independent without use of device  ADL Assistance: independent with all ADL's  IADL Assistance: independent with homemaking tasks  Active :        [] Yes                 [x] No; hasn't driven in a few months  Hand Dominance: [] Left                 [x] Right  Current Employment: currently unemployed; last year, 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

## 2024-11-13 NOTE — PROGRESS NOTES
Jm Snow  11/13/2024  [id number]    Chief Complaint: Critical illness myopathy    Subjective    No acute events overnight.     Patient reports that he is doing well today. Having ongoing shooting pains in his lower legs and feet. Rash now now involving his right thigh. Denies any pain or pruritus. Did have some stinging when therapy applied Biofreeze in this region earlier. Had an episodes of symptomatic orthostasis w/ therapy, caused him to feel a little anxious. Continues to have difficulties urinating when staff is assisting him.     Last Bowel Movement:  11/12/24        Objective    Patient Vitals for the past 24 hrs:   BP Temp Temp src Pulse Resp SpO2 Height Weight   11/13/24 0800 (!) 153/73 97.3 °F (36.3 °C) Oral (!) 117 18 97 % -- --   11/13/24 0540 -- -- -- -- -- -- -- 114.6 kg (252 lb 9.6 oz)   11/12/24 2015 117/68 97.6 °F (36.4 °C) Oral (!) 117 16 96 % -- --   11/12/24 1946 -- -- -- -- -- 96 % -- --   11/12/24 1015 -- -- -- -- -- -- 1.93 m (6' 3.98\") --     Gen: No distress, pleasant.   HEENT: Normocephalic, atraumatic.   CV: Regular rate and rhythm. Extremities warm, well perfused.   Resp: No respiratory distress. CTAB.  Abd: Soft, nontender.  Ext: No edema.   Skin: Lacy red rash involving abdomen and bilateral thighs, appears to be following skin fissures.   Neuro: Alert, oriented, appropriately interactive.     Laboratory data: Available via EMR.     Therapy progress:       PT    Supine to Sit: Supervision or touching assistance  Sit to Supine: Substantial/maximal assistance   Sit to Stand: Dependent  Chair/Bed to Chair Transfer: Dependent  Car Transfer:    Ambulation 10 ft: Partial/moderate assistance  Ambulation 50 ft:    Ambulation 150 ft: Partial/moderate assistance  Stairs - 1 Step:    Stairs - 4 Step:    Stairs - 12 Step:      OT    Eating: Setup or clean-up assistance  Oral Hygiene: Partial/moderate assistance  Bathing: Substantial/maximal assistance  Upper Body Dressing: Setup or clean-up

## 2024-11-14 PROCEDURE — 6370000000 HC RX 637 (ALT 250 FOR IP): Performed by: STUDENT IN AN ORGANIZED HEALTH CARE EDUCATION/TRAINING PROGRAM

## 2024-11-14 PROCEDURE — 97110 THERAPEUTIC EXERCISES: CPT

## 2024-11-14 PROCEDURE — 97530 THERAPEUTIC ACTIVITIES: CPT

## 2024-11-14 PROCEDURE — 1280000000 HC REHAB R&B

## 2024-11-14 PROCEDURE — 97535 SELF CARE MNGMENT TRAINING: CPT

## 2024-11-14 PROCEDURE — 97116 GAIT TRAINING THERAPY: CPT

## 2024-11-14 RX ORDER — POLYETHYLENE GLYCOL 3350 17 G/17G
17 POWDER, FOR SOLUTION ORAL DAILY
Status: DISCONTINUED | OUTPATIENT
Start: 2024-11-15 | End: 2024-11-18

## 2024-11-14 RX ORDER — HYDROCORTISONE 10 MG/G
CREAM TOPICAL 2 TIMES DAILY
Status: DISPENSED | OUTPATIENT
Start: 2024-11-14 | End: 2024-11-17

## 2024-11-14 RX ADMIN — HYDROCORTISONE: 10 CREAM TOPICAL at 15:00

## 2024-11-14 RX ADMIN — ANTACID TABLETS 500 MG: 500 TABLET, CHEWABLE ORAL at 11:58

## 2024-11-14 RX ADMIN — THERA TABS 1 TABLET: TAB at 08:05

## 2024-11-14 RX ADMIN — ANTACID TABLETS 500 MG: 500 TABLET, CHEWABLE ORAL at 08:04

## 2024-11-14 RX ADMIN — Medication 50 MG: at 08:15

## 2024-11-14 RX ADMIN — POTASSIUM BICARBONATE 25 MEQ: 978 TABLET, EFFERVESCENT ORAL at 08:04

## 2024-11-14 RX ADMIN — GABAPENTIN 300 MG: 300 CAPSULE ORAL at 08:04

## 2024-11-14 RX ADMIN — Medication: at 08:05

## 2024-11-14 RX ADMIN — BUSPIRONE HYDROCHLORIDE 10 MG: 5 TABLET ORAL at 13:49

## 2024-11-14 RX ADMIN — ACETAMINOPHEN 650 MG: 325 TABLET ORAL at 11:58

## 2024-11-14 RX ADMIN — GABAPENTIN 300 MG: 300 CAPSULE ORAL at 20:33

## 2024-11-14 RX ADMIN — APIXABAN 5 MG: 5 TABLET, FILM COATED ORAL at 08:04

## 2024-11-14 RX ADMIN — BUSPIRONE HYDROCHLORIDE 10 MG: 5 TABLET ORAL at 20:33

## 2024-11-14 RX ADMIN — ESCITALOPRAM OXALATE 20 MG: 10 TABLET ORAL at 05:24

## 2024-11-14 RX ADMIN — TRAZODONE HYDROCHLORIDE 50 MG: 50 TABLET ORAL at 20:37

## 2024-11-14 RX ADMIN — VITAM B12 50 MCG: 100 TAB at 08:05

## 2024-11-14 RX ADMIN — POLYETHYLENE GLYCOL 3350 17 G: 17 POWDER, FOR SOLUTION ORAL at 08:04

## 2024-11-14 RX ADMIN — GABAPENTIN 300 MG: 300 CAPSULE ORAL at 13:49

## 2024-11-14 RX ADMIN — Medication 400 MG: at 08:05

## 2024-11-14 RX ADMIN — APIXABAN 5 MG: 5 TABLET, FILM COATED ORAL at 20:33

## 2024-11-14 RX ADMIN — VALSARTAN 80 MG: 80 TABLET, FILM COATED ORAL at 08:04

## 2024-11-14 RX ADMIN — Medication 100 MG: at 08:04

## 2024-11-14 RX ADMIN — ANTACID TABLETS 500 MG: 500 TABLET, CHEWABLE ORAL at 17:13

## 2024-11-14 RX ADMIN — ACETAMINOPHEN 650 MG: 325 TABLET ORAL at 08:04

## 2024-11-14 RX ADMIN — FOLIC ACID 1 MG: 1 TABLET ORAL at 08:04

## 2024-11-14 RX ADMIN — BUSPIRONE HYDROCHLORIDE 10 MG: 5 TABLET ORAL at 08:04

## 2024-11-14 ASSESSMENT — PAIN SCALES - GENERAL
PAINLEVEL_OUTOF10: 2
PAINLEVEL_OUTOF10: 5
PAINLEVEL_OUTOF10: 2

## 2024-11-14 ASSESSMENT — PAIN DESCRIPTION - LOCATION
LOCATION: LEG
LOCATION: LEG

## 2024-11-14 ASSESSMENT — PAIN - FUNCTIONAL ASSESSMENT: PAIN_FUNCTIONAL_ASSESSMENT: ACTIVITIES ARE NOT PREVENTED

## 2024-11-14 ASSESSMENT — PAIN DESCRIPTION - ORIENTATION: ORIENTATION: RIGHT;LEFT

## 2024-11-14 ASSESSMENT — PAIN DESCRIPTION - DESCRIPTORS: DESCRIPTORS: ACHING

## 2024-11-14 NOTE — PROGRESS NOTES
[facility] - Inpatient Rehabilitation Department   Phone: [phone]    Occupational Therapy    [] Initial Evaluation            [x] Daily Treatment Note         [] Discharge Summary      Patient: Jm Snow   : 1995   MRN: [id number]   Date of Service:  2024    Admitting Diagnosis:  Critical illness myopathy  Current Admission Summary:   Jm Snow is a 28 y.o. male with PMHx notable for EtOH use disorder, anxiety and HTN who [place] [facility] [place]  with sepsis on , found to have fungenemia of unclear source, possibly secondary to bilateral hip AVN with septic arthritis (however, left hip aspirate cultures with no growth).  He completed 7 days of empiric antibiotics.  Hospital course was complicated by severe TONYA acute renal failure due to ATN, requiring hemodialysis with now recovered renal function.  He additionally developed acute hypoxic respiratory failure secondary to ARDS and aspiration pneumonia requiring intubation on 10/12.  He was transferred to [facility] MICU on 10/12 for further management. Extubated on 10/18.  Hospital course was further complicated by encephalopathy with agitation and psychosis (episodes of hallucination), suspected multifactorial due to metabolic derangements, active infection and possible alcohol withdrawal, as well as ileus.      Developed tachycardia and fever shortly after ARU admission on 10/30, and was discharged acutely to internal medicine service in acute hospital for further workup and management early morning on 10/31.  He developed hypoxia, requiring intermittent high flow oxygen.  CTA chest showed left upper lobe PE, as well as bilateral pleural effusions and pneumonia.  He was restarted on broad-spectrum IV antibiotics, and ID was consulted. Blood cultures no growth to date. He is now completing course of PO Levaquin. He was started on heparin drip for PE, which has now been discontinued in favor of oral anticoagulation with  weeks   Patient will complete lower body ADL at moderate assistance   Patient will complete toileting at moderate assistance   Patient will complete functional transfers at moderate assistance   Patient to maintain standing at contact guard assistance for 3 minutes.  Long Term Goals:  To be completed in: 2-3 weeks   Patient will complete upper body ADL at modified independent   Patient will complete lower body ADL at modified independent   Patient will complete toileting at modified independent   Patient will complete functional transfers at modified independent     Above goals reviewed on 11/14/2024.  All goals are ongoing at this time unless indicated above.       Therapy Session Time     Individual Group Co-treatment   Time In   0830   Time Out   0930   Minutes   60      Second Session Therapy Time:   Individual Concurrent Group Co-treatment   Time In    1300   Time Out    1330   Minutes    30     Timed Code Treatment Minutes: 60+30 minutes  Total Treatment Minutes: 90 minutes        Electronically Signed By: CARLY Pak MOT OTR/L, [id number] 11/14/2024 3:07 PM

## 2024-11-14 NOTE — PROGRESS NOTES
x3 with 1 missed attempt at modA of 1 with assist to stand and prevent anterior loss of balance. Pt ambulates 30 feet and pivots back to recliner at modA of 1. Pt left in recliner with chair alarm on and call light within reach.     Functional Outcomes                 Cognition  WFL  Comments: reduced insight to severity of impairments and expected rate of recovery - responds well to education but will require follow up education within sessions  Orientation:    alert and oriented x 4  Command Following:   WFL    Education  Barriers To Learning: emotional - anxiety  Patient Education: patient educated on goals, PT role and benefits, plan of care, general safety, functional mobility training, proper use of assistive device/equipment, transfer training, benefits of out of bed positioning on functional recovery, recommendation for toileting with nursing using stedy  Learning Assessment:  patient verbalizes understanding, would benefit from continued reinforcement    Assessment  Activity Tolerance: fair; motivated to improve but severe debility  Impairments Requiring Therapeutic Intervention: decreased functional mobility, decreased ADL status, decreased ROM, decreased strength, decreased endurance, decreased sensation, decreased balance, decreased posture  Prognosis: good  Clinical Assessment: Patient completes repeated sit to stand transfers from various elevated surface heights with less therapist assist this day. He still requires assist to rise from standard chair height, but shows improved LE strength and tolerance of standing position. He transitions to bathroom this date with stedy following encouragement to simulate home environment and decrease dependence with bathroom tasks. LE range and function improvements indicated with increased tolerance to activity and ability to compete functional activities. Pt remains motivated to improve functional status to allow return to home environment.  Safety Interventions:  patient left in chair, chair alarm in place, call light within reach, gait belt, patient at risk for falls, and nurse notified    Plan  Frequency: 5 x/week, 90 min/day  Current Treatment Recommendations: strengthening, ROM, balance training, functional mobility training, transfer training, gait training, stair training, endurance training, neuromuscular re-education, wheelchair mobility training, and equipment evaluation/education    Goals  Patient Goals: \"I just want to get home so I can walk a little and get around in my wheelchair if I need to.\"   Short Term Goals:  Time Frame: 14 days  Patient will complete bed mobility at contact guard assistance   Patient will complete transfers at minimal assistance   Patient will ambulate 50 ft with use of rolling walker at minimal assistance  Patient will ascend/descend 1 stairs without use of HR at maximum assistance  Patient will complete car transfer at minimal assistance  Patient will complete manual w/c propulsion 150 ft at modified independent  Patient to maintain standing at minimal assistance for 5 minutes.    Above goals reviewed on 11/14/2024.  All goals are ongoing at this time unless indicated above.      Therapy Session Time      Individual Group Co-treatment   Time In     0830   Time Out     0930   Minutes     60       Second Session Therapy Time:   Individual Group Co-treatment   Time In    1300   Time Out    1330   Minutes    30       Total Treatment Minutes:  90 minutes     Electronically Signed By: DANIEL Gates  Therapist observed and directed the patient's plan of care.  Co-signed and supervised by: Chuy Wang PT, DPT - [id number]

## 2024-11-14 NOTE — PLAN OF CARE
Problem: Discharge Planning  Goal: Discharge to home or other facility with appropriate resources  11/13/2024 2158 by Kelsy Croft RN  Outcome: Progressing  11/13/2024 1501 by Elena Berger RN  Outcome: Progressing     Problem: Safety - Adult  Goal: Free from fall injury  11/13/2024 2158 by Kelsy Croft RN  Outcome: Progressing  11/13/2024 1501 by Elena Berger RN  Outcome: Progressing     Problem: Pain  Goal: Verbalizes/displays adequate comfort level or baseline comfort level  11/13/2024 2158 by Kelsy Croft RN  Outcome: Progressing  11/13/2024 1501 by Elena Berger RN  Outcome: Progressing     Problem: Nutrition Deficit:  Goal: Optimize nutritional status  Outcome: Progressing     Problem: Skin/Tissue Integrity  Goal: Absence of new skin breakdown  Description: 1.  Monitor for areas of redness and/or skin breakdown  2.  Assess vascular access sites hourly  3.  Every 4-6 hours minimum:  Change oxygen saturation probe site  4.  Every 4-6 hours:  If on nasal continuous positive airway pressure, respiratory therapy assess nares and determine need for appliance change or resting period.  Outcome: Progressing

## 2024-11-14 NOTE — PROGRESS NOTES
Pt. Assessment complete. Pt. Pulled up in bed. Tremors noted. Denies any complaints at this time. Father at bedside. All needs met at this time. Call light within reach. Safety/fall precautions in place.

## 2024-11-14 NOTE — PROGRESS NOTES
Jm Snow  11/14/2024  [id number]    Chief Complaint: Critical illness myopathy    Subjective    No acute events overnight.     Patient reports he is doing well. He reports ongoing pain in his thighs and calves. Still having burning/shooting pain in his feet. Tolerating gabapentin without adverse effect, but unsure if it is helping much at current dose. He has been using ice packs to his feet with some relief. Tried topical lidocaine in the past, may have irritated his skin. Also notes bright red blood per rectum when wiping, which he says is chronic and related to hemorrhoids. Having some loose bowel movements, would like bowel medications reduced.     Last Bowel Movement:  11/13/24        Objective    Patient Vitals for the past 24 hrs:   BP Temp Temp src Pulse Resp SpO2 Weight   11/14/24 0800 135/83 97.8 °F (36.6 °C) Oral (!) 115 18 97 % --   11/14/24 0600 -- -- -- -- -- -- 112.9 kg (249 lb)   11/13/24 2046 118/69 98 °F (36.7 °C) Oral (!) 123 20 98 % --   11/13/24 1340 (!) 95/57 -- -- -- -- -- --   11/13/24 1319 (!) 87/51 -- -- -- -- -- --   11/13/24 1315 (!) 100/59 -- -- -- -- -- --     Gen: No distress, pleasant.   HEENT: Normocephalic, atraumatic.   CV: Regular rate and rhythm. Extremities warm, well perfused.   Resp: No respiratory distress. CTAB.  Abd: Soft, nontender.  Ext: No edema.   Skin: Lacy red rash involving abdomen and bilateral thighs, appears to be following skin fissures.   Neuro: Alert, oriented, appropriately interactive.     Laboratory data: Available via EMR.     Therapy progress:       PT    Supine to Sit: Supervision or touching assistance  Sit to Supine: Substantial/maximal assistance   Sit to Stand: Dependent  Chair/Bed to Chair Transfer: Dependent  Car Transfer:    Ambulation 10 ft: Partial/moderate assistance  Ambulation 50 ft:    Ambulation 150 ft: Partial/moderate assistance  Stairs - 1 Step:    Stairs - 4 Step:    Stairs - 12 Step:      OT    Eating: Setup or clean-up  assistance  Oral Hygiene: Partial/moderate assistance  Bathing: Substantial/maximal assistance  Upper Body Dressing: Setup or clean-up assistance  Lower Body Dressing: Dependent  Toilet Transfer: Dependent  Toilet Hygiene: Dependent    Speech Therapy    Pt presents with functional cognitive linguistic skills, receptive and expressive language and motor speech. Pt with spastic movements but did not impact speech quality nor intelligibility. Pt with mild errors in short term (low context) memory tasks. He reported his anxiety often impacts his ability to attend or retain new information. Accuracy was improved with contextual or functional recall tasks. Pt declined any changes from his baseline and deferred offering for SLP intervention during his acute rehab stay. Will d/c SLP intervention at this time.    Body mass index is 30.32 kg/m².        Assessment/Plan:  Functional progress: Remains dependent for lower body dressing and toileting.    This patient continues to require an ARU level of care from all disciplines to address the following issues:    #. Critical Illness Myopathy/Polyneuropathy  - Due to septic shock requiring vasopressor support, with acute respiratory failure (due to ARDS, aspiration PNA) requiring mechanical ventilation and acute renal failure requiring intermittent hemodialysis   - Continue PT/OT  - Bracing/DME needs at discharge TBD  - Prevalon boots for heel protection  - Gabapentin 200 mg TID for neuropathic pain     #. PE  - BLE Venous Duplex: negative for DVT  - Continue Eliquis      #. Recent ileus, resolved  - Tolerating diet, having bowel movements  - Continue bowel regimen  - Avoid narcotics     #. Hx of bilateral hip AVN  - Needs outpatient Ortho follow-up  - Multimodal pain management, as below    #. EtOH use disorder  - Out of withdrawal window  - Continue thiamine, multivitamin  - Counseling/education, community resources at discharge     #. Hypothyroidism  - TSH 12.3, T4 1.1 on  10/31  - Recheck as outpatient     #. HTN  - Continue home valsartan 80 mg daily     #. Anxiety  - Continue escitalopram 20 mg daily, and buspirone (increased to 10 TID on 11/9)      #. Left hallux and 4th toe skin necrosis  - Due to vasopressor induced ischemic injury  - Podiatry evaluated, recommended betadine paint daily.      #. FEN  - Hypomagnesemia, continue PO repletion (reduce to once daily)  - Hypokalemia, continue PO repletion (reduce to once daily)  - Hypocalcemia: Ca 1.8, ionized Ca 1.05. Continue PO calcium carbonate.   - Severe Vit D deficiency: Continue high-dose weekly supplementation  - Continue zinc repletion  - Continue B12 repletion     #. Xerosis  - Aquaphor PRN    #. External hemorrhoids  - Hydrocortisone cream BID x3 days    #. TONYA, resolved  - Required intermittent HD at [facility]    #. Multifocal PNA  - Sepsis resolved  - Completed course of PO Levaquin, last dose 11/8    #. Lumbar spondylosis  - MRI L-Spine: Mild multilevel degenerative disease and facet arthrosis centered at L5-S1 with moderate spinal canal stenosis, moderate left and mild right neural foraminal narrowing at that level.   - Continue PT  - Pain management as below    CODE: Full Code  Diet: ADULT DIET; Regular  ADULT ORAL NUTRITION SUPPLEMENT; Breakfast, Lunch, Dinner; Standard High Calorie/High Protein Oral Supplement  Bowels: Reduce Miralax to once daily. Senna PRN.   Bladder: Per protocol   Sleep: Melatonin 3 mg nightly PRN, Trazodone 50 mg nightly PRN  Pain: Tylenol 650 mg q4h PRN, lidocaine patch PRN for back pain, gabapentin 300 mg TID  DVT PPx: Fully anticoagulated   Follow-ups: Hematology, Podiatry, PCP  ELOS: 16 days      Chinedu Rosas MD 11/14/2024, 9:10 AM    * This document was created using dictation software.  While all precautions were taken to ensure accuracy, errors may have occurred.  Please disregard any typographical errors.

## 2024-11-15 LAB
ANION GAP SERPL CALCULATED.3IONS-SCNC: 9 MMOL/L (ref 3–16)
BUN SERPL-MCNC: 3 MG/DL (ref 7–20)
CALCIUM SERPL-MCNC: 8.4 MG/DL (ref 8.3–10.6)
CHLORIDE SERPL-SCNC: 108 MMOL/L (ref 99–110)
CO2 SERPL-SCNC: 24 MMOL/L (ref 21–32)
CREAT SERPL-MCNC: 0.5 MG/DL (ref 0.9–1.3)
GFR SERPLBLD CREATININE-BSD FMLA CKD-EPI: >90 ML/MIN/{1.73_M2}
GLUCOSE SERPL-MCNC: 81 MG/DL (ref 70–99)
POTASSIUM SERPL-SCNC: 3.9 MMOL/L (ref 3.5–5.1)
SODIUM SERPL-SCNC: 141 MMOL/L (ref 136–145)

## 2024-11-15 PROCEDURE — 1280000000 HC REHAB R&B

## 2024-11-15 PROCEDURE — 6370000000 HC RX 637 (ALT 250 FOR IP): Performed by: STUDENT IN AN ORGANIZED HEALTH CARE EDUCATION/TRAINING PROGRAM

## 2024-11-15 PROCEDURE — 97535 SELF CARE MNGMENT TRAINING: CPT

## 2024-11-15 PROCEDURE — 97530 THERAPEUTIC ACTIVITIES: CPT

## 2024-11-15 PROCEDURE — 97110 THERAPEUTIC EXERCISES: CPT

## 2024-11-15 PROCEDURE — 97116 GAIT TRAINING THERAPY: CPT

## 2024-11-15 PROCEDURE — 36415 COLL VENOUS BLD VENIPUNCTURE: CPT

## 2024-11-15 PROCEDURE — 84630 ASSAY OF ZINC: CPT

## 2024-11-15 PROCEDURE — 80048 BASIC METABOLIC PNL TOTAL CA: CPT

## 2024-11-15 PROCEDURE — 94669 MECHANICAL CHEST WALL OSCILL: CPT

## 2024-11-15 RX ORDER — CALCIUM CARBONATE 500 MG/1
500 TABLET, CHEWABLE ORAL 2 TIMES DAILY WITH MEALS
Status: DISCONTINUED | OUTPATIENT
Start: 2024-11-15 | End: 2024-11-18

## 2024-11-15 RX ORDER — GABAPENTIN 400 MG/1
400 CAPSULE ORAL 3 TIMES DAILY
Status: DISCONTINUED | OUTPATIENT
Start: 2024-11-15 | End: 2024-11-16

## 2024-11-15 RX ADMIN — Medication 100 MG: at 08:43

## 2024-11-15 RX ADMIN — Medication 400 MG: at 08:43

## 2024-11-15 RX ADMIN — POLYETHYLENE GLYCOL 3350 17 G: 17 POWDER, FOR SOLUTION ORAL at 08:44

## 2024-11-15 RX ADMIN — BUSPIRONE HYDROCHLORIDE 10 MG: 5 TABLET ORAL at 13:32

## 2024-11-15 RX ADMIN — Medication 50 MG: at 08:49

## 2024-11-15 RX ADMIN — BUSPIRONE HYDROCHLORIDE 10 MG: 5 TABLET ORAL at 08:43

## 2024-11-15 RX ADMIN — FOLIC ACID 1 MG: 1 TABLET ORAL at 08:43

## 2024-11-15 RX ADMIN — ANTACID TABLETS 500 MG: 500 TABLET, CHEWABLE ORAL at 16:47

## 2024-11-15 RX ADMIN — GABAPENTIN 300 MG: 300 CAPSULE ORAL at 13:32

## 2024-11-15 RX ADMIN — VITAM B12 50 MCG: 100 TAB at 08:43

## 2024-11-15 RX ADMIN — ACETAMINOPHEN 650 MG: 325 TABLET ORAL at 16:52

## 2024-11-15 RX ADMIN — GABAPENTIN 400 MG: 400 CAPSULE ORAL at 20:42

## 2024-11-15 RX ADMIN — TRAZODONE HYDROCHLORIDE 50 MG: 50 TABLET ORAL at 20:43

## 2024-11-15 RX ADMIN — APIXABAN 5 MG: 5 TABLET, FILM COATED ORAL at 08:43

## 2024-11-15 RX ADMIN — THERA TABS 1 TABLET: TAB at 08:43

## 2024-11-15 RX ADMIN — GABAPENTIN 300 MG: 300 CAPSULE ORAL at 08:43

## 2024-11-15 RX ADMIN — BUSPIRONE HYDROCHLORIDE 10 MG: 5 TABLET ORAL at 20:42

## 2024-11-15 RX ADMIN — POTASSIUM BICARBONATE 25 MEQ: 978 TABLET, EFFERVESCENT ORAL at 08:44

## 2024-11-15 RX ADMIN — HYDROCORTISONE: 10 CREAM TOPICAL at 08:50

## 2024-11-15 RX ADMIN — ESCITALOPRAM OXALATE 20 MG: 10 TABLET ORAL at 05:14

## 2024-11-15 RX ADMIN — Medication: at 08:50

## 2024-11-15 RX ADMIN — ANTACID TABLETS 500 MG: 500 TABLET, CHEWABLE ORAL at 08:43

## 2024-11-15 RX ADMIN — APIXABAN 5 MG: 5 TABLET, FILM COATED ORAL at 20:42

## 2024-11-15 ASSESSMENT — PAIN DESCRIPTION - LOCATION
LOCATION: OTHER (COMMENT)
LOCATION: GENERALIZED

## 2024-11-15 ASSESSMENT — PAIN - FUNCTIONAL ASSESSMENT: PAIN_FUNCTIONAL_ASSESSMENT: ACTIVITIES ARE NOT PREVENTED

## 2024-11-15 ASSESSMENT — PAIN DESCRIPTION - DESCRIPTORS: DESCRIPTORS: DULL;SORE

## 2024-11-15 ASSESSMENT — PAIN SCALES - GENERAL
PAINLEVEL_OUTOF10: 0
PAINLEVEL_OUTOF10: 3
PAINLEVEL_OUTOF10: 5

## 2024-11-15 ASSESSMENT — PAIN DESCRIPTION - ORIENTATION: ORIENTATION: LEFT;RIGHT

## 2024-11-15 NOTE — PROGRESS NOTES
[facility] - Inpatient Rehabilitation Department   Phone: [phone]    Occupational Therapy    [] Initial Evaluation            [x] Daily Treatment Note         [] Discharge Summary      Patient: Jm Snow   : 1995   MRN: [id number]   Date of Service:  11/15/2024    Admitting Diagnosis:  Critical illness myopathy  Current Admission Summary:   Jm Snow is a 28 y.o. male with PMHx notable for EtOH use disorder, anxiety and HTN who presented to [facility] on  with sepsis on , found to have fungenemia of unclear source, possibly secondary to bilateral hip AVN with septic arthritis (however, left hip aspirate cultures with no growth).  He completed 7 days of empiric antibiotics.  Hospital course was complicated by severe TONYA acute renal failure due to ATN, requiring hemodialysis with now recovered renal function.  He additionally developed acute hypoxic respiratory failure secondary to ARDS and aspiration pneumonia requiring intubation on 10/12.  He was transferred to [facility] MICU on 10/12 for further management. Extubated on 10/18.  Hospital course was further complicated by encephalopathy with agitation and psychosis (episodes of hallucination), suspected multifactorial due to metabolic derangements, active infection and possible alcohol withdrawal, as well as ileus.      Developed tachycardia and fever shortly after ARU admission on 10/30, and was discharged acutely to internal medicine service in acute hospital for further workup and management early morning on 10/31.  He developed hypoxia, requiring intermittent high flow oxygen.  CTA chest showed left upper lobe PE, as well as bilateral pleural effusions and pneumonia.  He was restarted on broad-spectrum IV antibiotics, and ID was consulted. Blood cultures no growth to date. He is now completing course of PO Levaquin. He was started on heparin drip for PE, which has now been discontinued in favor of oral anticoagulation with  complete toileting at moderate assistance   Patient will complete functional transfers at moderate assistance   Patient to maintain standing at contact guard assistance for 3 minutes.  Long Term Goals:  To be completed in: 2-3 weeks   Patient will complete upper body ADL at modified independent   Patient will complete lower body ADL at modified independent   Patient will complete toileting at modified independent   Patient will complete functional transfers at modified independent     Above goals reviewed on 11/18/2024.  All goals are ongoing at this time unless indicated above.       Therapy Session Time     Individual Group Co-treatment   Time In 1000     Time Out 1100     Minutes 60        Second Session Therapy Time:   Individual Concurrent Group Co-treatment   Time In    1245   Time Out    1323   Minutes    38     Timed Code Treatment Minutes: 60+38 minutes  Total Treatment Minutes: 98 minutes        Electronically Signed By: CARLY Pak MOT OTR/L, [id number] 11/18/2024 3:29 PM

## 2024-11-15 NOTE — PROGRESS NOTES
Jm Snow  11/15/2024  [id number]    Chief Complaint: Critical illness myopathy    Subjective    No acute events overnight.     Patient reports that he is doing well today overall.  He continues to have neuropathic pain in his feet bilaterally.  He and his family are concerned with him having no scheduled therapy over the weekend.  They would like structured exercises to perform with him while they visit.     Last Bowel Movement:  11/14/24        Objective    Patient Vitals for the past 24 hrs:   BP Temp Temp src Pulse Resp SpO2   11/15/24 0843 108/70 -- -- -- -- --   11/14/24 2015 131/82 98.9 °F (37.2 °C) Oral (!) 119 18 97 %     Gen: No distress, pleasant.   HEENT: Normocephalic, atraumatic.   CV: Regular rate and rhythm. Extremities warm, well perfused.   Resp: No respiratory distress. CTAB.  Abd: Soft, nontender.  Ext: No edema.   Skin: Lacy red rash involving abdomen and bilateral thighs, appears to be following skin fissures.   Neuro: Alert, oriented, appropriately interactive.     Laboratory data: Available via EMR.     Therapy progress:       PT    Supine to Sit: Supervision or touching assistance  Sit to Supine: Substantial/maximal assistance   Sit to Stand: Dependent  Chair/Bed to Chair Transfer: Dependent  Car Transfer:    Ambulation 10 ft: Partial/moderate assistance  Ambulation 50 ft:    Ambulation 150 ft: Partial/moderate assistance  Stairs - 1 Step:    Stairs - 4 Step:    Stairs - 12 Step:      OT    Eating: Setup or clean-up assistance  Oral Hygiene: Partial/moderate assistance  Bathing: Substantial/maximal assistance  Upper Body Dressing: Setup or clean-up assistance  Lower Body Dressing: Dependent  Toilet Transfer: Dependent  Toilet Hygiene: Dependent    Speech Therapy    Pt presents with functional cognitive linguistic skills, receptive and expressive language and motor speech. Pt with spastic movements but did not impact speech quality nor intelligibility. Pt with mild errors in short term  deficiency: Continue high-dose weekly supplementation.  - Continue zinc repletion  - Continue B12 repletion     #. Xerosis  - Aquaphor PRN    #. External hemorrhoids  - Hydrocortisone cream BID x3 days (EOT 11/16)    #. TONYA, resolved  - Required intermittent HD at [facility]    #. Multifocal PNA  - Sepsis resolved  - Completed course of PO Levaquin, last dose 11/8    #. Lumbar spondylosis  - MRI L-Spine: Mild multilevel degenerative disease and facet arthrosis centered at L5-S1 with moderate spinal canal stenosis, moderate left and mild right neural foraminal narrowing at that level.   - Continue PT  - Pain management as below    CODE: Full Code  Diet: ADULT DIET; Regular  ADULT ORAL NUTRITION SUPPLEMENT; Breakfast, Lunch, Dinner; Standard High Calorie/High Protein Oral Supplement  Bowels: Miralax once daily. Senna PRN.   Bladder: Per protocol   Sleep: Melatonin 3 mg nightly PRN, Trazodone 50 mg nightly PRN  Pain: Tylenol 650 mg q4h PRN, lidocaine patch PRN for back pain, gabapentin 400 mg TID  DVT PPx: Fully anticoagulated   Follow-ups: Hematology, Podiatry, PCP  ELOS: 16 days      Chinedu Rosas MD 11/15/2024, 8:55 AM    * This document was created using dictation software.  While all precautions were taken to ensure accuracy, errors may have occurred.  Please disregard any typographical errors.

## 2024-11-15 NOTE — PROGRESS NOTES
Assessment complets. Pt Aox4, VSS. Pt c/o anxiety, scheduled med given. Pt  up in chair, BLE elevated with tenzos on. Pt denies needs at this time. Call light in reach, chair alarm on for safety.

## 2024-11-15 NOTE — PROGRESS NOTES
Pt. Assessment compete. Pt. Alert and oriented x's 4.  Denies any complaints at this time. All needs met. Fall/safety precautions in place. Father at bedside.

## 2024-11-15 NOTE — PLAN OF CARE
Problem: Discharge Planning  Goal: Discharge to home or other facility with appropriate resources  11/14/2024 2146 by Kelsy rCoft RN  Outcome: Progressing  11/14/2024 1446 by Elena Berger RN  Outcome: Progressing     Problem: Safety - Adult  Goal: Free from fall injury  11/14/2024 2146 by Kelsy Croft RN  Outcome: Progressing  11/14/2024 1446 by Elena Berger RN  Outcome: Progressing     Problem: Pain  Goal: Verbalizes/displays adequate comfort level or baseline comfort level  11/14/2024 2146 by Kelsy Croft RN  Outcome: Progressing  11/14/2024 1446 by Elena Berger RN  Outcome: Progressing     Problem: Nutrition Deficit:  Goal: Optimize nutritional status  Outcome: Progressing     Problem: Skin/Tissue Integrity  Goal: Absence of new skin breakdown  Description: 1.  Monitor for areas of redness and/or skin breakdown  2.  Assess vascular access sites hourly  3.  Every 4-6 hours minimum:  Change oxygen saturation probe site  4.  Every 4-6 hours:  If on nasal continuous positive airway pressure, respiratory therapy assess nares and determine need for appliance change or resting period.  Outcome: Progressing

## 2024-11-15 NOTE — PROGRESS NOTES
[facility] - Inpatient Rehabilitation Department   Phone: [phone]    Physical Therapy    [] Initial Evaluation            [x] Daily Treatment Note         [] Discharge Summary      Patient: Jm Snow   : 1995   MRN: [id number]   Date of Service:  11/15/2024  Admitting Diagnosis: Critical illness myopathy  Current Admission Summary: Jm Snow is a 29 y.o. male with PMHx notable for EtOH use disorder, anxiety and HTN who presented to [facility] on  with sepsis on , found to have fungenemia of unclear source, possibly secondary to bilateral hip AVN with septic arthritis (however, left hip aspirate cultures with no growth).  He completed 7 days of empiric antibiotics.  Hospital course was complicated by severe TONYA acute renal failure due to ATN, requiring hemodialysis with now recovered renal function.  He additionally developed acute hypoxic respiratory failure secondary to ARDS and aspiration pneumonia requiring intubation on 10/12.  He was transferred to [facility] MICU on 10/12 for further management. Extubated on 10/18.  Hospital course was further complicated by encephalopathy with agitation and psychosis (episodes of hallucination), suspected multifactorial due to metabolic derangements, active infection and possible alcohol withdrawal, as well as ileus.      Developed tachycardia and fever shortly after ARU admission on 10/30, and was discharged acutely to internal medicine service in acute hospital for further workup and management early morning on 10/31.  He developed hypoxia, requiring intermittent high flow oxygen.  CTA chest showed left upper lobe PE, as well as bilateral pleural effusions and pneumonia.  He was restarted on broad-spectrum IV antibiotics, and ID was consulted. Blood cultures no growth to date. He is now completing course of PO Levaquin. He was started on heparin drip for PE, which has now been discontinued in favor of oral anticoagulation with Eliquis.  GI  restroom with nursing and eat meals out of bed this weekend to prevent decline in functional mobility and maintain gains in therapy. He remains motivated to improve functional status to allow return to home environment.  Safety Interventions: patient left in chair, chair alarm in place, call light within reach, gait belt, patient at risk for falls, and nurse notified    Plan  Frequency: 5 x/week, 90 min/day  Current Treatment Recommendations: strengthening, ROM, balance training, functional mobility training, transfer training, gait training, stair training, endurance training, neuromuscular re-education, wheelchair mobility training, and equipment evaluation/education    Goals  Patient Goals: \"I just want to get home so I can walk a little and get around in my wheelchair if I need to.\"   Short Term Goals:  Time Frame: 14 days  Patient will complete bed mobility at contact guard assistance   Patient will complete transfers at minimal assistance   Patient will ambulate 50 ft with use of rolling walker at minimal assistance  Patient will ascend/descend 1 stairs without use of HR at maximum assistance  Patient will complete car transfer at minimal assistance  Patient will complete manual w/c propulsion 150 ft at modified independent  Patient to maintain standing at minimal assistance for 5 minutes.    Above goals reviewed on 11/15/2024.  All goals are ongoing at this time unless indicated above.      Therapy Session Time      Individual Group Co-treatment   Time In     0730   Time Out     0830   Minutes     60       Second Session Therapy Time:   Individual Group Co-treatment   Time In    1245   Time Out    1323   Minutes    38       Total Treatment Minutes:  98     First Session Completed By: Allie Celaya, SPT  Therapist observed and directed the patient's plan of care.  Co-signed and supervised by: Chuy Wang PT, DPT - [id number]    Second Session Completed By: Bonnie Aguillon PT DPT [id number]

## 2024-11-16 LAB — ZINC SERPL-MCNC: 65.4 UG/DL (ref 60–120)

## 2024-11-16 PROCEDURE — 6370000000 HC RX 637 (ALT 250 FOR IP): Performed by: STUDENT IN AN ORGANIZED HEALTH CARE EDUCATION/TRAINING PROGRAM

## 2024-11-16 PROCEDURE — 1280000000 HC REHAB R&B

## 2024-11-16 RX ORDER — GABAPENTIN 400 MG/1
400 CAPSULE ORAL EVERY 8 HOURS SCHEDULED
Status: DISCONTINUED | OUTPATIENT
Start: 2024-11-16 | End: 2024-11-22 | Stop reason: HOSPADM

## 2024-11-16 RX ADMIN — GABAPENTIN 400 MG: 400 CAPSULE ORAL at 20:21

## 2024-11-16 RX ADMIN — GABAPENTIN 400 MG: 400 CAPSULE ORAL at 14:20

## 2024-11-16 RX ADMIN — Medication 50 MG: at 08:35

## 2024-11-16 RX ADMIN — GABAPENTIN 400 MG: 400 CAPSULE ORAL at 08:35

## 2024-11-16 RX ADMIN — ACETAMINOPHEN 650 MG: 325 TABLET ORAL at 18:14

## 2024-11-16 RX ADMIN — APIXABAN 5 MG: 5 TABLET, FILM COATED ORAL at 20:21

## 2024-11-16 RX ADMIN — ANTACID TABLETS 500 MG: 500 TABLET, CHEWABLE ORAL at 08:35

## 2024-11-16 RX ADMIN — BUSPIRONE HYDROCHLORIDE 10 MG: 5 TABLET ORAL at 08:34

## 2024-11-16 RX ADMIN — ESCITALOPRAM OXALATE 20 MG: 10 TABLET ORAL at 06:26

## 2024-11-16 RX ADMIN — BUSPIRONE HYDROCHLORIDE 10 MG: 5 TABLET ORAL at 20:21

## 2024-11-16 RX ADMIN — VALSARTAN 80 MG: 80 TABLET, FILM COATED ORAL at 08:34

## 2024-11-16 RX ADMIN — TRAZODONE HYDROCHLORIDE 50 MG: 50 TABLET ORAL at 20:21

## 2024-11-16 RX ADMIN — APIXABAN 5 MG: 5 TABLET, FILM COATED ORAL at 08:35

## 2024-11-16 RX ADMIN — ANTACID TABLETS 500 MG: 500 TABLET, CHEWABLE ORAL at 18:14

## 2024-11-16 RX ADMIN — VITAM B12 50 MCG: 100 TAB at 08:34

## 2024-11-16 RX ADMIN — FOLIC ACID 1 MG: 1 TABLET ORAL at 08:35

## 2024-11-16 RX ADMIN — THERA TABS 1 TABLET: TAB at 08:35

## 2024-11-16 RX ADMIN — Medication 100 MG: at 08:35

## 2024-11-16 RX ADMIN — BUSPIRONE HYDROCHLORIDE 10 MG: 5 TABLET ORAL at 14:20

## 2024-11-16 ASSESSMENT — PAIN DESCRIPTION - DESCRIPTORS
DESCRIPTORS: ACHING
DESCRIPTORS: ACHING

## 2024-11-16 ASSESSMENT — PAIN DESCRIPTION - LOCATION
LOCATION: FOOT;LEG
LOCATION: LEG;FOOT

## 2024-11-16 ASSESSMENT — PAIN DESCRIPTION - FREQUENCY: FREQUENCY: CONTINUOUS

## 2024-11-16 ASSESSMENT — PAIN DESCRIPTION - ONSET: ONSET: ON-GOING

## 2024-11-16 ASSESSMENT — PAIN DESCRIPTION - PAIN TYPE: TYPE: ACUTE PAIN

## 2024-11-16 ASSESSMENT — PAIN - FUNCTIONAL ASSESSMENT: PAIN_FUNCTIONAL_ASSESSMENT: ACTIVITIES ARE NOT PREVENTED

## 2024-11-16 ASSESSMENT — PAIN DESCRIPTION - ORIENTATION
ORIENTATION: RIGHT;LEFT
ORIENTATION: RIGHT;LEFT

## 2024-11-16 ASSESSMENT — PAIN SCALES - WONG BAKER
WONGBAKER_NUMERICALRESPONSE: NO HURT
WONGBAKER_NUMERICALRESPONSE: NO HURT

## 2024-11-16 ASSESSMENT — PAIN SCALES - GENERAL
PAINLEVEL_OUTOF10: 1
PAINLEVEL_OUTOF10: 0

## 2024-11-16 NOTE — PLAN OF CARE
Problem: Discharge Planning  Goal: Discharge to home or other facility with appropriate resources  Outcome: Progressing     Problem: Safety - Adult  Goal: Free from fall injury  Outcome: Progressing     Problem: Pain  Goal: Verbalizes/displays adequate comfort level or baseline comfort level  Outcome: Progressing     Problem: Nutrition Deficit:  Goal: Optimize nutritional status  Outcome: Progressing     Problem: Skin/Tissue Integrity  Goal: Absence of new skin breakdown  Description: 1.  Monitor for areas of redness and/or skin breakdown  2.  Assess vascular access sites hourly  3.  Every 4-6 hours minimum:  Change oxygen saturation probe site  4.  Every 4-6 hours:  If on nasal continuous positive airway pressure, respiratory therapy assess nares and determine need for appliance change or resting period.  Outcome: Progressing     Problem: ABCDS Injury Assessment  Goal: Absence of physical injury  Outcome: Progressing

## 2024-11-16 NOTE — PLAN OF CARE
Problem: Discharge Planning  Goal: Discharge to home or other facility with appropriate resources  Outcome: Progressing  Flowsheets (Taken 11/15/2024 0843)  Discharge to home or other facility with appropriate resources: Identify barriers to discharge with patient and caregiver     Problem: Safety - Adult  Goal: Free from fall injury  Outcome: Progressing     Problem: Pain  Goal: Verbalizes/displays adequate comfort level or baseline comfort level  Outcome: Progressing     Problem: Nutrition Deficit:  Goal: Optimize nutritional status  Outcome: Progressing     Problem: Skin/Tissue Integrity  Goal: Absence of new skin breakdown  Description: 1.  Monitor for areas of redness and/or skin breakdown  2.  Assess vascular access sites hourly  3.  Every 4-6 hours minimum:  Change oxygen saturation probe site  4.  Every 4-6 hours:  If on nasal continuous positive airway pressure, respiratory therapy assess nares and determine need for appliance change or resting period.  Outcome: Progressing     Problem: ABCDS Injury Assessment  Goal: Absence of physical injury  Outcome: Progressing  Flowsheets (Taken 11/15/2024 1530)  Absence of Physical Injury: Implement safety measures based on patient assessment

## 2024-11-17 ENCOUNTER — APPOINTMENT (OUTPATIENT)
Dept: GENERAL RADIOLOGY | Age: 29
DRG: 091 | End: 2024-11-17
Attending: STUDENT IN AN ORGANIZED HEALTH CARE EDUCATION/TRAINING PROGRAM
Payer: COMMERCIAL

## 2024-11-17 VITALS
WEIGHT: 251.7 LBS | DIASTOLIC BLOOD PRESSURE: 79 MMHG | HEIGHT: 76 IN | OXYGEN SATURATION: 97 % | SYSTOLIC BLOOD PRESSURE: 133 MMHG | BODY MASS INDEX: 30.65 KG/M2 | RESPIRATION RATE: 18 BRPM | TEMPERATURE: 99.2 F | HEART RATE: 111 BPM

## 2024-11-17 PROCEDURE — 1280000000 HC REHAB R&B

## 2024-11-17 PROCEDURE — 6370000000 HC RX 637 (ALT 250 FOR IP): Performed by: STUDENT IN AN ORGANIZED HEALTH CARE EDUCATION/TRAINING PROGRAM

## 2024-11-17 PROCEDURE — 74018 RADEX ABDOMEN 1 VIEW: CPT

## 2024-11-17 PROCEDURE — 94669 MECHANICAL CHEST WALL OSCILL: CPT

## 2024-11-17 RX ADMIN — Medication: at 21:30

## 2024-11-17 RX ADMIN — ANTACID TABLETS 500 MG: 500 TABLET, CHEWABLE ORAL at 09:54

## 2024-11-17 RX ADMIN — GABAPENTIN 400 MG: 400 CAPSULE ORAL at 21:26

## 2024-11-17 RX ADMIN — BUSPIRONE HYDROCHLORIDE 10 MG: 5 TABLET ORAL at 09:54

## 2024-11-17 RX ADMIN — BUSPIRONE HYDROCHLORIDE 10 MG: 5 TABLET ORAL at 21:26

## 2024-11-17 RX ADMIN — BUSPIRONE HYDROCHLORIDE 10 MG: 5 TABLET ORAL at 13:39

## 2024-11-17 RX ADMIN — Medication: at 09:54

## 2024-11-17 RX ADMIN — GABAPENTIN 400 MG: 400 CAPSULE ORAL at 05:44

## 2024-11-17 RX ADMIN — THERA TABS 1 TABLET: TAB at 09:53

## 2024-11-17 RX ADMIN — ESCITALOPRAM OXALATE 20 MG: 10 TABLET ORAL at 05:44

## 2024-11-17 RX ADMIN — ANTACID TABLETS 500 MG: 500 TABLET, CHEWABLE ORAL at 19:33

## 2024-11-17 RX ADMIN — APIXABAN 5 MG: 5 TABLET, FILM COATED ORAL at 21:29

## 2024-11-17 RX ADMIN — SENNOSIDES 17.2 MG: 8.6 TABLET, FILM COATED ORAL at 21:26

## 2024-11-17 RX ADMIN — APIXABAN 5 MG: 5 TABLET, FILM COATED ORAL at 09:54

## 2024-11-17 RX ADMIN — Medication 50 MG: at 09:56

## 2024-11-17 RX ADMIN — VITAM B12 50 MCG: 100 TAB at 09:53

## 2024-11-17 RX ADMIN — GABAPENTIN 400 MG: 400 CAPSULE ORAL at 13:39

## 2024-11-17 RX ADMIN — SKIN PROTECTANT: 44 OINTMENT TOPICAL at 21:00

## 2024-11-17 RX ADMIN — TRAZODONE HYDROCHLORIDE 50 MG: 50 TABLET ORAL at 21:26

## 2024-11-17 RX ADMIN — FOLIC ACID 1 MG: 1 TABLET ORAL at 09:54

## 2024-11-17 RX ADMIN — VALSARTAN 80 MG: 80 TABLET, FILM COATED ORAL at 09:53

## 2024-11-17 RX ADMIN — Medication 100 MG: at 09:54

## 2024-11-17 ASSESSMENT — PAIN SCALES - GENERAL: PAINLEVEL_OUTOF10: 0

## 2024-11-17 NOTE — PROGRESS NOTES
11/17/24 0909   Treatment   Treatment Type Vibratory mucous clearing therapy or intervention  (Accapella)   Breath Sounds   Right Upper Lobe Clear   Right Middle Lobe Clear   Right Lower Lobe Clear   Left Upper Lobe Clear   Oxygen Therapy/Pulse Ox   O2 Therapy Room air   O2 Device None (Room air)   Respirations 16   SpO2 95 %   Cough/Sputum   Sputum How Obtained Cough on request   Cough Strong;Non-productive   Sputum Amount None     RT will instruct pt on use of accapella as needed    Electronically signed by QUOC Felipe, RCP on 11/17/24 at 9:18 AM EST

## 2024-11-17 NOTE — PLAN OF CARE
Problem: Discharge Planning  Goal: Discharge to home or other facility with appropriate resources  11/17/2024 0003 by Elena Rooney RN  Outcome: Progressing  Flowsheets (Taken 11/16/2024 2016)  Discharge to home or other facility with appropriate resources: Identify barriers to discharge with patient and caregiver  11/16/2024 1801 by Karina Garcia RN  Outcome: Progressing     Problem: Safety - Adult  Goal: Free from fall injury  11/17/2024 0003 by Elena Rooney RN  Outcome: Progressing  Flowsheets (Taken 11/17/2024 0002)  Free From Fall Injury: Instruct family/caregiver on patient safety  11/16/2024 1801 by Karina Garcia RN  Outcome: Progressing     Problem: Pain  Goal: Verbalizes/displays adequate comfort level or baseline comfort level  11/17/2024 0003 by Elena Rooney RN  Outcome: Progressing  Flowsheets (Taken 11/16/2024 1945)  Verbalizes/displays adequate comfort level or baseline comfort level: Assess pain using appropriate pain scale  11/16/2024 1801 by Karina Garcia RN  Outcome: Progressing     Problem: Nutrition Deficit:  Goal: Optimize nutritional status  11/17/2024 0003 by Elena Rooney RN  Outcome: Progressing  Flowsheets (Taken 11/12/2024 1015 by Violeta Weiss)  Nutrient intake appropriate for improving, restoring, or maintaining nutritional needs:   Assess nutritional status and recommend course of action   Monitor oral intake, labs, and treatment plans   Recommend appropriate diets, oral nutritional supplements, and vitamin/mineral supplements  11/16/2024 1801 by Karina Garcia RN  Outcome: Progressing     Problem: Skin/Tissue Integrity  Goal: Absence of new skin breakdown  Description: 1.  Monitor for areas of redness and/or skin breakdown  2.  Assess vascular access sites hourly  3.  Every 4-6 hours minimum:  Change oxygen saturation probe site  4.  Every 4-6 hours:  If on nasal continuous positive airway pressure, respiratory therapy assess nares and determine need for appliance change or  alternative coping skills  3. Provide emotional support with 1:1 interaction with staff  Outcome: Progressing  Flowsheets (Taken 11/16/2024 2016)  Will report anxiety at manageable levels: Provide emotional support with 1:1 interaction with staff     Problem: Coping  Goal: Pt/Family able to verbalize concerns and demonstrate effective coping strategies  Description: INTERVENTIONS:  1. Assist patient/family to identify coping skills, available support systems and cultural and spiritual values  2. Provide emotional support, including active listening and acknowledgement of concerns of patient and caregivers  3. Reduce environmental stimuli, as able  4. Instruct patient/family in relaxation techniques, as appropriate  5. Assess for spiritual pain/suffering and initiate Spiritual Care, Psychosocial Clinical Specialist consults as needed  Outcome: Progressing  Flowsheets (Taken 11/17/2024 0003)  Patient/family able to verbalize anxieties, fears, and concerns, and demonstrate effective coping: Provide emotional support, including active listening and acknowledgement of concerns of patient and caregivers

## 2024-11-17 NOTE — PROGRESS NOTES
The pt had large loose incontinent stool before 6am today.  Held Glycolax.  Provided the pt's mother with a medication list per her request.

## 2024-11-17 NOTE — PLAN OF CARE
Problem: Discharge Planning  Goal: Discharge to home or other facility with appropriate resources  11/17/2024 0852 by Karina Garcia RN  Outcome: Progressing     Problem: Safety - Adult  Goal: Free from fall injury  11/17/2024 0852 by Karina Garcia RN  Outcome: Progressing     Problem: Pain  Goal: Verbalizes/displays adequate comfort level or baseline comfort level  11/17/2024 0852 by Karina Garcia RN  Outcome: Progressing     Problem: Nutrition Deficit:  Goal: Optimize nutritional status  11/17/2024 0852 by Karina Garcia RN  Outcome: Progressing     Problem: Skin/Tissue Integrity  Goal: Absence of new skin breakdown  Description: 1.  Monitor for areas of redness and/or skin breakdown  2.  Assess vascular access sites hourly  3.  Every 4-6 hours minimum:  Change oxygen saturation probe site  4.  Every 4-6 hours:  If on nasal continuous positive airway pressure, respiratory therapy assess nares and determine need for appliance change or resting period.  11/17/2024 0852 by Karina Garcia RN  Outcome: Progressing     Problem: ABCDS Injury Assessment  Goal: Absence of physical injury  11/17/2024 0852 by Karina Garcia RN  Outcome: Progressing     Problem: Neurosensory - Adult  Goal: Achieves stable or improved neurological status  11/17/2024 0852 by Karina Garcia RN  Outcome: Progressing     Problem: Cardiovascular - Adult  Goal: Maintains optimal cardiac output and hemodynamic stability  11/17/2024 0852 by Karina Garcia RN  Outcome: Progressing     Problem: Musculoskeletal - Adult  Goal: Return mobility to safest level of function  11/17/2024 0852 by Karina Garcia RN  Outcome: Progressing     Problem: Gastrointestinal - Adult  Goal: Minimal or absence of nausea and vomiting  11/17/2024 0852 by Karina Garcia RN  Outcome: Progressing     Problem: Anxiety  Goal: Will report anxiety at manageable levels  Description: INTERVENTIONS:  1. Administer medication as ordered  2. Teach and rehearse alternative coping skills  3. Provide  emotional support with 1:1 interaction with staff  11/17/2024 0852 by Karina Garcia RN  Outcome: Progressing     Problem: Coping  Goal: Pt/Family able to verbalize concerns and demonstrate effective coping strategies  Description: INTERVENTIONS:  1. Assist patient/family to identify coping skills, available support systems and cultural and spiritual values  2. Provide emotional support, including active listening and acknowledgement of concerns of patient and caregivers  3. Reduce environmental stimuli, as able  4. Instruct patient/family in relaxation techniques, as appropriate  5. Assess for spiritual pain/suffering and initiate Spiritual Care, Psychosocial Clinical Specialist consults as needed  11/17/2024 0852 by Karina Garcia RN  Outcome: Progressing

## 2024-11-18 LAB
ANION GAP SERPL CALCULATED.3IONS-SCNC: 8 MMOL/L (ref 3–16)
BASOPHILS # BLD: 0 K/UL (ref 0–0.2)
BASOPHILS NFR BLD: 1 %
BUN SERPL-MCNC: 3 MG/DL (ref 7–20)
CALCIUM SERPL-MCNC: 8.2 MG/DL (ref 8.3–10.6)
CHLORIDE SERPL-SCNC: 111 MMOL/L (ref 99–110)
CO2 SERPL-SCNC: 23 MMOL/L (ref 21–32)
CREAT SERPL-MCNC: 0.4 MG/DL (ref 0.9–1.3)
DEPRECATED RDW RBC AUTO: 16.2 % (ref 12.4–15.4)
EOSINOPHIL # BLD: 0.1 K/UL (ref 0–0.6)
EOSINOPHIL NFR BLD: 5.9 %
FUNGUS BLD CULT: NORMAL
GFR SERPLBLD CREATININE-BSD FMLA CKD-EPI: >90 ML/MIN/{1.73_M2}
GLUCOSE SERPL-MCNC: 80 MG/DL (ref 70–99)
HCT VFR BLD AUTO: 28.2 % (ref 40.5–52.5)
HGB BLD-MCNC: 9 G/DL (ref 13.5–17.5)
LYMPHOCYTES # BLD: 0.4 K/UL (ref 1–5.1)
LYMPHOCYTES NFR BLD: 18.4 %
MAGNESIUM SERPL-MCNC: 1.61 MG/DL (ref 1.8–2.4)
MCH RBC QN AUTO: 28.6 PG (ref 26–34)
MCHC RBC AUTO-ENTMCNC: 31.8 G/DL (ref 31–36)
MCV RBC AUTO: 90 FL (ref 80–100)
MONOCYTES # BLD: 0.3 K/UL (ref 0–1.3)
MONOCYTES NFR BLD: 16.7 %
NEUTROPHILS # BLD: 1.2 K/UL (ref 1.7–7.7)
NEUTROPHILS NFR BLD: 58 %
PLATELET # BLD AUTO: 188 K/UL (ref 135–450)
PMV BLD AUTO: 8.2 FL (ref 5–10.5)
POTASSIUM SERPL-SCNC: 3.1 MMOL/L (ref 3.5–5.1)
RBC # BLD AUTO: 3.13 M/UL (ref 4.2–5.9)
SODIUM SERPL-SCNC: 142 MMOL/L (ref 136–145)
WBC # BLD AUTO: 2.1 K/UL (ref 4–11)

## 2024-11-18 PROCEDURE — 6370000000 HC RX 637 (ALT 250 FOR IP): Performed by: STUDENT IN AN ORGANIZED HEALTH CARE EDUCATION/TRAINING PROGRAM

## 2024-11-18 PROCEDURE — 97530 THERAPEUTIC ACTIVITIES: CPT

## 2024-11-18 PROCEDURE — 05HB33Z INSERTION OF INFUSION DEVICE INTO RIGHT BASILIC VEIN, PERCUTANEOUS APPROACH: ICD-10-PCS | Performed by: STUDENT IN AN ORGANIZED HEALTH CARE EDUCATION/TRAINING PROGRAM

## 2024-11-18 PROCEDURE — 2580000003 HC RX 258: Performed by: STUDENT IN AN ORGANIZED HEALTH CARE EDUCATION/TRAINING PROGRAM

## 2024-11-18 PROCEDURE — 99222 1ST HOSP IP/OBS MODERATE 55: CPT | Performed by: NURSE PRACTITIONER

## 2024-11-18 PROCEDURE — 97535 SELF CARE MNGMENT TRAINING: CPT

## 2024-11-18 PROCEDURE — 85025 COMPLETE CBC W/AUTO DIFF WBC: CPT

## 2024-11-18 PROCEDURE — 80048 BASIC METABOLIC PNL TOTAL CA: CPT

## 2024-11-18 PROCEDURE — 97110 THERAPEUTIC EXERCISES: CPT

## 2024-11-18 PROCEDURE — C1751 CATH, INF, PER/CENT/MIDLINE: HCPCS

## 2024-11-18 PROCEDURE — 36410 VNPNXR 3YR/> PHY/QHP DX/THER: CPT

## 2024-11-18 PROCEDURE — 97116 GAIT TRAINING THERAPY: CPT

## 2024-11-18 PROCEDURE — 6360000002 HC RX W HCPCS: Performed by: STUDENT IN AN ORGANIZED HEALTH CARE EDUCATION/TRAINING PROGRAM

## 2024-11-18 PROCEDURE — 36415 COLL VENOUS BLD VENIPUNCTURE: CPT

## 2024-11-18 PROCEDURE — 1280000000 HC REHAB R&B

## 2024-11-18 PROCEDURE — 83735 ASSAY OF MAGNESIUM: CPT

## 2024-11-18 PROCEDURE — 36569 INSJ PICC 5 YR+ W/O IMAGING: CPT

## 2024-11-18 RX ORDER — POTASSIUM CHLORIDE 1500 MG/1
20 TABLET, EXTENDED RELEASE ORAL
Status: DISPENSED | OUTPATIENT
Start: 2024-11-18 | End: 2024-11-19

## 2024-11-18 RX ORDER — MAGNESIUM SULFATE IN WATER 40 MG/ML
2000 INJECTION, SOLUTION INTRAVENOUS ONCE
Status: COMPLETED | OUTPATIENT
Start: 2024-11-18 | End: 2024-11-18

## 2024-11-18 RX ORDER — SODIUM CHLORIDE 0.9 % (FLUSH) 0.9 %
5-40 SYRINGE (ML) INJECTION PRN
Status: DISCONTINUED | OUTPATIENT
Start: 2024-11-18 | End: 2024-11-22 | Stop reason: HOSPADM

## 2024-11-18 RX ORDER — CALCIUM CARBONATE 500 MG/1
500 TABLET, CHEWABLE ORAL
Status: DISCONTINUED | OUTPATIENT
Start: 2024-11-18 | End: 2024-11-22 | Stop reason: HOSPADM

## 2024-11-18 RX ORDER — LANOLIN ALCOHOL/MO/W.PET/CERES
400 CREAM (GRAM) TOPICAL DAILY
Status: DISCONTINUED | OUTPATIENT
Start: 2024-11-18 | End: 2024-11-18

## 2024-11-18 RX ORDER — POLYETHYLENE GLYCOL 3350 17 G/17G
17 POWDER, FOR SOLUTION ORAL DAILY PRN
Status: DISCONTINUED | OUTPATIENT
Start: 2024-11-18 | End: 2024-11-22 | Stop reason: HOSPADM

## 2024-11-18 RX ADMIN — APIXABAN 5 MG: 5 TABLET, FILM COATED ORAL at 20:59

## 2024-11-18 RX ADMIN — FOLIC ACID 1 MG: 1 TABLET ORAL at 10:37

## 2024-11-18 RX ADMIN — GABAPENTIN 400 MG: 400 CAPSULE ORAL at 06:42

## 2024-11-18 RX ADMIN — THERA TABS 1 TABLET: TAB at 10:36

## 2024-11-18 RX ADMIN — ANTACID TABLETS 500 MG: 500 TABLET, CHEWABLE ORAL at 12:48

## 2024-11-18 RX ADMIN — BUSPIRONE HYDROCHLORIDE 10 MG: 5 TABLET ORAL at 20:59

## 2024-11-18 RX ADMIN — Medication 100 MG: at 10:37

## 2024-11-18 RX ADMIN — POTASSIUM CHLORIDE 20 MEQ: 1500 TABLET, EXTENDED RELEASE ORAL at 17:04

## 2024-11-18 RX ADMIN — POTASSIUM CHLORIDE 20 MEQ: 1500 TABLET, EXTENDED RELEASE ORAL at 12:48

## 2024-11-18 RX ADMIN — BUSPIRONE HYDROCHLORIDE 10 MG: 5 TABLET ORAL at 12:48

## 2024-11-18 RX ADMIN — GABAPENTIN 400 MG: 400 CAPSULE ORAL at 12:48

## 2024-11-18 RX ADMIN — VITAM B12 50 MCG: 100 TAB at 10:36

## 2024-11-18 RX ADMIN — Medication: at 12:51

## 2024-11-18 RX ADMIN — APIXABAN 5 MG: 5 TABLET, FILM COATED ORAL at 12:47

## 2024-11-18 RX ADMIN — MAGNESIUM SULFATE HEPTAHYDRATE 2000 MG: 40 INJECTION, SOLUTION INTRAVENOUS at 15:17

## 2024-11-18 RX ADMIN — SODIUM CHLORIDE, PRESERVATIVE FREE 10 ML: 5 INJECTION INTRAVENOUS at 17:34

## 2024-11-18 RX ADMIN — ANTACID TABLETS 500 MG: 500 TABLET, CHEWABLE ORAL at 17:04

## 2024-11-18 RX ADMIN — GABAPENTIN 400 MG: 400 CAPSULE ORAL at 20:59

## 2024-11-18 RX ADMIN — ESCITALOPRAM OXALATE 20 MG: 10 TABLET ORAL at 06:42

## 2024-11-18 RX ADMIN — BUSPIRONE HYDROCHLORIDE 10 MG: 5 TABLET ORAL at 10:35

## 2024-11-18 ASSESSMENT — PAIN SCALES - GENERAL
PAINLEVEL_OUTOF10: 0
PAINLEVEL_OUTOF10: 0

## 2024-11-18 NOTE — PROGRESS NOTES
Abdomen sound tympanic and loud.  Abdomen distended.   Had gushing watery brown stool x2 in 2 days.  Had loose large stool at 5am and was incontinent.  The pt had mod or large watery out put x2 with small amount of loose stool on a bedpan.   KUB done to r/o obstruction.

## 2024-11-18 NOTE — CONSULTS
[facility] Orthopedic Surgery  Consult Note    Patient: Jm Snow  Admit Date: 11/7/2024  Requesting Physician: Chinedu Rosas MD  Room: [facility] [place] [address]    Chief complaint: Bilateral hip AVN    HPI: Jm Snow is a 29 y.o. [facility] but was sent to inpatient side of hospital given concern for sepsis.  He is now back in ARU and working to get stronger.  He has known AVN bilateral hips and was to followup with Dr. Darrick Lyn at  as there were considering core decompression. He reports no hip or groin pain today. His biggest issue is weakness. .  Denies new numbness/tingling.       Independent imaging review of the pelvis via plain films demonstrated: AVN l>R hips with cystic changes.     Hx alcohol abuse, tested positive for cocaine at recent  admission.     Relevant notes, labs and other tests reviewed.  Medical History:  Past Medical History:   Diagnosis Date    Acute anxiety 10/19/2024    Acute HFrEF (heart failure with reduced ejection fraction) ([facility]) 10/25/2024    TONYA (acute kidney injury) ([facility]) 10/25/2024    Anasarca 10/25/2024    ARDS (adult respiratory distress syndrome) 10/19/2024    ATN (acute tubular necrosis) ([facility]) 10/25/2024    Constipation 10/27/2024    ETOH abuse 10/19/2024    Fungemia 10/19/2024    Ileus ([facility]) 10/27/2024    Prolonged Q-T interval on ECG 10/25/2024    Septic shock ([facility]) 10/19/2024    Secondary to Fungemia     History reviewed. No pertinent surgical history.    Social History:    reports that he has never smoked. He has never used smokeless tobacco.    Family History:  No family history on file.    Medications:  ALL MEDICATIONS HAVE BEEN REVIEWED:  Scheduled:   magnesium oxide  400 mg Oral Daily    potassium chloride  20 mEq Oral TID WC    calcium carbonate  500 mg Oral TID WC    gabapentin  400 mg Oral 3 times per day    vitamin D  50,000 Units Oral Weekly    escitalopram  20 mg Oral QAM AC    busPIRone  10 mg Oral TID    multivitamin  1  tablet Oral Daily    apixaban  5 mg Oral BID    balsum peru-castor oil   Topical BID    folic acid  1 mg Oral Daily    vitamin B-1  100 mg Oral Daily    valsartan  80 mg Oral Daily    vitamin B-12  50 mcg Oral Daily     Continuous:  PRN:polyethylene glycol, mineral oil-hydrophilic petrolatum, lidocaine, acetaminophen, melatonin, ondansetron, traZODone, senna    Allergies: No Known Allergies    Review of Systems:  Constitutional: Negative for fever, chills, fatigue.   Skin:  Negative for pruritis, rash  Eyes: Negative for photophobia and visual disturbance.   ENT:  Negative for rhinorrhea, epistaxis, sore throat  Respiratory:  Negative for cough and shortness of breath.   Cardiovascular: Negative for chest pain.   Gastrointestinal: Negative for nausea, vomiting, diarrhea.  Genitourinary: Negative for dysuria and difficulty urinating.   Neurological: Negative for confusion, dysarthria, tremors, seizures.   Psychiatric:  Negative for depression or anxiety  Musculoskeletal:  Positive for limited left hip ROM    Objective:  Vitals:    11/18/24 0915   BP: (!) 79/44   Pulse: (!) 112   Resp: 18   Temp: 98.8 °F (37.1 °C)   SpO2: 96%      Physical Examination:  GENERAL: No apparent distress, well-nourished  SKIN:  Warm and dry  EYES: Nonicteric.   ENT: Mucous membranes moist  HEAD: Normocephalic, atraumatic  RESPIRATORY: Resp easy and unlabored  CARDIOVASCULAR: Regular rate and rhythm  GI: Abdomen soft, nontender  NEURO: Awake and alert.  No speech defect  PSYCHIATRIC: Appropriate affect; not agitated  MUSCULOSKELETAL:  Hips  Inspection: On exam there are no ulcerations, rashes or lesions about the bilateral hips.   There is no pain to palpation of the hips.  He has some discomfort with ROM of left hip, none on the right.  Motor: Intact DF/PF bilaterally  Sensation: Grossly intact to light touch throughout the bilateral lower extremities in all nerve distributions.  Vascular:  2+ bilateral DP pulse.    Labs reviewed:  Recent

## 2024-11-18 NOTE — PROGRESS NOTES
Perfectserved Leanna NP for Ortho consult after KUB results. Called consult to [facility] answering service for possible ileus. Pt. NPO at this time.

## 2024-11-18 NOTE — CONSULTS
Gastroenterology Consult Note        Patient: Jm Snow  : 1995  Acct#:      Date:  2024      No diagnosis found.    Subjective:       History of Present Illness  Patient is a 29 y.o.  male admitted with Critical illness myopathy [G72.81] who is seen in consult for abdominal distention.   History of anxiety, hypertension, alcohol use disorder.  He was admitted to  on 24  sepsis.  Found to have fungemia of unclear source.  Course complicated by alcohol withdrawal, TONYA requiring dialysis, respiratory failure requiring intubation, ileus requiring NG tube decompression.  Urine positive for cocaine at .   He was transferred to ARU here in the sent to the ICU on 10/31/2024 for respiratory failure.  Was diagnosed with PE and pneumonia.  He was seen by GI here for CT imaging concerning for ileus although he was having 8-9 mushy/unformed bowel movements.  Patient has been in ARU recovering.  He was on scheduled MiraLAX.  Was having loose bowel movements.  Then Saturday he had watery bowel movements.  He received senna (ordered PRN) last night.  Had multiple watery bowel movements overnight.  Denies any black or bloody bowel movements.  Denies any abdominal distention, nausea, vomiting.  States was tolerating diet over the weekend.      Past Medical History:   Diagnosis Date    Acute anxiety 10/19/2024    Acute HFrEF (heart failure with reduced ejection fraction) ([facility]) 10/25/2024    TONYA (acute kidney injury) ([facility]) 10/25/2024    Anasarca 10/25/2024    ARDS (adult respiratory distress syndrome) 10/19/2024    ATN (acute tubular necrosis) ([facility]) 10/25/2024    Constipation 10/27/2024    ETOH abuse 10/19/2024    Fungemia 10/19/2024    Ileus ([facility]) 10/27/2024    Prolonged Q-T interval on ECG 10/25/2024    Septic shock ([facility]) 10/19/2024    Secondary to Fungemia      History reviewed. No pertinent surgical history.   Past Endoscopic History: none    Admission Meds  No current  for ileus.  Clinically there is no ileus as he is having bowel movements and no nausea, vomiting or abdominal distention.  -d/c'd scheduled MiraLAX   -P.o. mag oxide can cause diarrhea.  Consider replacing magnesium IV if possible.  -Clear liquid diet and advance as tolerated    Will discuss with Dr. Jeremi Finch, PA-C  [facility]    [facility]  I have personally performed a face to face diagnostic evaluation on this patient.  I have interviewed and examined the patient and I agree with the findings and recommended plan of care.  In summary, my findings and plan are the following: As above, young man recovering from acute septic illness complicated by alcohol withdrawal respiratory failure as outlined above.  He has had ileus in the past and there was concern for ileus over the weekend especially given some colonic and small bowel air-filled loops on x-ray.  He has been having bowel movements and no nausea and vomiting.  Agree with modifications to his bowel regimen as described above.  Agree with clear liquid diet today.  We will repeat his abdominal x-ray in the morning.  I encouraged him to get up in the chair 3 times daily with meals and to comply with physical therapy.  Mobility along with electrolyte replacement will allow any possible residual ileus to resolve.    Doyle Blood MD  [facility]  11/18/2024

## 2024-11-18 NOTE — PLAN OF CARE
environmental stimuli, as able  4. Instruct patient/family in relaxation techniques, as appropriate  5. Assess for spiritual pain/suffering and initiate Spiritual Care, Psychosocial Clinical Specialist consults as needed  Outcome: Progressing  Flowsheets (Taken 11/17/2024 0003)  Patient/family able to verbalize anxieties, fears, and concerns, and demonstrate effective coping: Provide emotional support, including active listening and acknowledgement of concerns of patient and caregivers

## 2024-11-18 NOTE — PROGRESS NOTES
Pt. Continuously on call light for urinal or bedpan. Encouraged pt., in front of father, that he needs to get up to the bathroom toilet when he needs to go. Pt. & father verbalized understanding. Father wants to get son up to side of bed with walker but was discouraged by day nurse. Day nurse recommended using stedy to get pt. Up to toilet and side of bed if wanted. After a long conversation with father and pt., pt. Didn't want to get out of bed to use the urinal or toilet. This nurse will continue to encourage pt. To get up to toilet for voiding and bm's. KUB resulted, new order for NPO status, consult GI, consult Ortho. Per father pt. Is supposed to folllow up with Ortho but he doesn't have a name or number to make an appointment. Advised father this nurse will consult both GI & Ortho and they can advise on who to see from here and what to do from here on out. Pt. Discouraged at this time and feels down about possibly having another ileus. Abdomen is rounded and taut. Pt. Grimaces when this nurse palpated abdomen. Pt. Had large bm this am soft and brown. After long discussion with father, father advised this nurse that pt. Holds his bm in at home b/c of limited mobility. Father says he holds in his flatulence as well. Will continue to monitor pt. And encourage him to move.

## 2024-11-18 NOTE — PROGRESS NOTES
Equipment: long handled sponge  Bathing Comments: Jaya for thoroughness of B foot hygiene d/t sensitivity w/ long handled sponge; long handled sponge for B knee>foot hygiene; Jaya for posterior kajal care in stance w/ RW w/ CGA-Jaya for balance and Jaya for kajal care - additional kajal care completed Jaya seated on BSC during shower  Upper Extremity Dressing: setup assistance  Lower Extremity Dressing: moderate assistance  Dressing Equipment: reacher, sock aide  Dressing Comments: reacher to thread briefs/pants over/under ankles while seated on BSC in shower; modA for balance for clothing management over/under hips while in stance w/ RW this date; set up to ander/doff shirt seated on BSC; increased time and occasional Jaya for threading shorts over ankles w/ reacher; pt doffed socks w/ reacher w/ Jaya; Jaya progressing to CGA for sock aid use to ander B socks this date  Toileting: maximum assistance.    Toileting Equipment: none  Toileting Comments: totalA for kajal care after numerous bowel movements while seated on BSC in shower; modA for balance for clothing management over/under hips while in stance w/ RW; pt voiding urine seated on BSC in shower and attempted to void into urinal - pt missing urinal and required increased time and assistance to clean  General Comments: significant time for all ADL completion; ace wraps applied to B LE's for edema management this date; pneumatic compression applied to B LE's when pt supine in bed at EOS - applied over ace wraps  Instrumental Activities of Daily Living  No IADL completed on this date.    Functional Mobility  Bed Mobility:  Supine to Sit: stand by assistance  Sit to Supine: moderate assistance  Scooting: contact guard assistance  Comments: completed w/ HOB slightly elevated for sup>sit and HOB flat for sit>sup w/ assistance for B LE's and assistance to reposition trunk after transition  Transfers:  Sit to stand transfer:moderate assistance, 2 person assistance with CGA            Cognition  WFL  Comments: patient with baseline anxiety   Orientation:    alert and oriented x 4  Command Following:   WFL     Education  Barriers To Learning: none  Patient Education: patient educated on goals, OT role and benefits, plan of care, ADL adaptive strategies, proper use of assistive device/equipment, transfer training, discharge recommendations  Learning Assessment:  patient verbalizes and demonstrates understanding    Assessment  Activity Tolerance: fair - rest breaks con't to be required throughout; hypotensive this AM w/ BP 80/52 and   Impairments Requiring Therapeutic Intervention: decreased functional mobility, decreased ADL status, decreased strength, decreased endurance, decreased balance, decreased IADL, decreased coordination, decreased posture  Prognosis: good  Clinical Assessment: Pt working hard w/ therapy. Con't to require Jaya for functional transfers and min-modA x2 for stair completion but progressing in this area as well. Decreased edema noted during AM session allowing for increased B knee ROM for increased ease in transfer and mobility completion. BP during AM session limiting to pt - 80/52. Pt progressing to modA for LB dressing this date including clothing management over/under hips while in stance w/ RW. Pt also utilized reacher and sock aid for LB dressing completion. Pt con't to demo decreased insight into abilities and safety for home. Predominately limited by global weakness, decreased endurance and anxiety. Continued skilled occupational therapy services recommended to facilitate return to PLOF and increase independence. Con't per POC.   Safety Interventions: patient left in bed, bed alarm in place, call light within reach, patient at risk for falls, and nurse notified    Plan  Frequency: 5 x/week, 90 min/day  Current Treatment Recommendations: strengthening, balance training, functional mobility training, transfer training, endurance training,

## 2024-11-18 NOTE — PROGRESS NOTES
Pt. Requesting for me not to turn him again. This nurse encouraged pt. To turn to the left side if he doesn't like the right but he needs to turn off of his coccyx. Pt. In agreeance & verbalizes understanding. Pt. Assisted in turning to his left side. Large loose stool noted. Abdominal distention has decreased drastically since 7pm. Bowel sounds hyperactive.

## 2024-11-18 NOTE — PROGRESS NOTES
Pt. Had loose stools frequently all night long. Stomach has gone down tremendously. Please see output section of chart. Heavily encouraged pt. To get up to toilet for stools & voiding, pt. Politely declined. Educated pt. On importance of getting up to help increase motility. Pt. Was turned q2h this shift and tried to resist at times, but in the end was in agreeance once he was heavily encouraged. No elevated temp or s/s of cdiff.

## 2024-11-18 NOTE — PROGRESS NOTES
Pt. Remains NPO awaiting GI consult for possible ileus. Pt. Had a bowel incontinence. Pt. Blaming not feeling the need to defecate on the fact that he took a Trazodone to sleep. Pt. Says he's not going to take that pill anymore. Pt. Encouraged to turn on his side every 2hrs. Pt. Sometimes refuses to turn. Bowel movement was soft, medium, & brown. Pt. Currently in the supine position, protective barrier cream applied, blanchable redness noted to entire kajal area.

## 2024-11-18 NOTE — CONSULTS
POWER MIDLINE PROCEDURE NOTE  Chart reviewed for allergies, diagnosis, labs, known contraindications, reason for line placement and planned length of treatment.  Insertion procedure discussed with patient/family member.  Informed consent not required for Midline placement.  Three patient identifiers - Patient name,   and MRN -  completed &  confirmed verbally.   Hat, mask and eye shield donned.  Midline site scrubbed with Chloraprep  One-Step applicator  for 30 seconds x 1.   Hand Hygiene  performed with 3% Chlorhexidine surgical scrub x1 min prior to  Sterile gloves, sterile gown being donned.  Patient draped using maximal sterile barrier technique ( head to toe ).  Midline site scrubbed a 2nd time with Chloraprep One-Step applicator x 30 sec. Vein located by Ultra Sound and site marked with sterile pen.  1% Lidocaine 5 ml injected intradermal pre-insertion for trimmed Midline.  Midline inserted.  Positive brisk blood return obtained.  Valve applied to lumen.  Midline flushed with 10 mls  0.9% Sterile Sodium Chloride. Midline flushes easily with no resistance.   Skin prep applied to site. Catheter secured with non-sutured locking device per hospital protocol.  Bio-patch/CHG impregnated sterile tegaderm dressing applied. Alcohol Swab Caps applied to valve.   Sterile field maintained during procedure. Positioning wire accounted for post procedure. Pt. Response to procedure, tolerated well. Appearance of site: Clean dry and intact without bleeding or edema. All edges of Tegaderm occlusive.   Site marked with date and initials of RN placing line.Top 2 side rails in up position, call button in reach, RN notified of all of the above.   A Power Midline 14 CM placed in the LUZ vein. 0 cm showing from insertion site.

## 2024-11-18 NOTE — PLAN OF CARE
Problem: Discharge Planning  Goal: Discharge to home or other facility with appropriate resources  11/18/2024 1023 by Karina Garcia RN  Outcome: Progressing     Problem: Safety - Adult  Goal: Free from fall injury  11/18/2024 1023 by Karina Garcia RN  Outcome: Progressing     Problem: Pain  Goal: Verbalizes/displays adequate comfort level or baseline comfort level  11/18/2024 1023 by Karina Garcia RN  Outcome: Progressing     Problem: Nutrition Deficit:  Goal: Optimize nutritional status  11/18/2024 1023 by Karina Garcia RN  Outcome: Progressing     Problem: Skin/Tissue Integrity  Goal: Absence of new skin breakdown  Description: 1.  Monitor for areas of redness and/or skin breakdown  2.  Assess vascular access sites hourly  3.  Every 4-6 hours minimum:  Change oxygen saturation probe site  4.  Every 4-6 hours:  If on nasal continuous positive airway pressure, respiratory therapy assess nares and determine need for appliance change or resting period.  11/18/2024 1023 by Karina Garcia RN  Outcome: Progressing     Problem: ABCDS Injury Assessment  Goal: Absence of physical injury  11/18/2024 1023 by Karina Garcia RN  Outcome: Progressing     Problem: Neurosensory - Adult  Goal: Achieves stable or improved neurological status  11/18/2024 1023 by Karina Garcia RN  Outcome: Progressing     Problem: Cardiovascular - Adult  Goal: Maintains optimal cardiac output and hemodynamic stability  11/18/2024 1023 by Karina Garcia RN  Outcome: Progressing     Problem: Musculoskeletal - Adult  Goal: Return mobility to safest level of function  11/18/2024 1023 by Karina Garcia RN  Outcome: Progressing     Problem: Gastrointestinal - Adult  Goal: Minimal or absence of nausea and vomiting  11/18/2024 1023 by Karina Garcia RN  Outcome: Progressing     Problem: Anxiety  Goal: Will report anxiety at manageable levels  Description: INTERVENTIONS:  1. Administer medication as ordered  2. Teach and rehearse alternative coping skills  3. Provide

## 2024-11-18 NOTE — PROGRESS NOTES
Jm Snow  11/18/2024  [id number]    Chief Complaint: Critical illness myopathy    Subjective    Frequent loose BM overnight.      Patient reports he is doing well today. Had an episode of symptomatic hypotension with therapy. Denies any abdominal pain, nausea/vomiting. Complaining primarily of numbness in his feet bilaterally.     Last Bowel Movement:  11/18/24        Objective    Patient Vitals for the past 24 hrs:   BP Temp Temp src Pulse Resp SpO2 Weight   11/18/24 1310 122/82 -- -- (!) 111 -- -- --   11/18/24 0945 (!) 80/52 -- -- (!) 116 -- -- --   11/18/24 0915 (!) 79/44 98.8 °F (37.1 °C) -- (!) 112 18 96 % --   11/18/24 0630 (!) 143/86 97.8 °F (36.6 °C) Oral (!) 108 18 95 % --   11/18/24 0600 -- -- -- -- -- -- 114.8 kg (253 lb)   11/17/24 2000 133/79 99.2 °F (37.3 °C) Oral (!) 111 18 97 % --     Gen: No distress, pleasant.   HEENT: Normocephalic, atraumatic.   CV: Regular rate and rhythm. Extremities warm, well perfused.   Resp: No respiratory distress. CTAB.  Abd: Soft, nontender.  Ext: No edema.   Skin: Lacy red rash involving abdomen and bilateral thighs, appears to be following skin fissures.   Neuro: Alert, oriented, appropriately interactive.     Laboratory data: Available via EMR.     Therapy progress:       PT    Supine to Sit: Supervision or touching assistance  Sit to Supine: Substantial/maximal assistance   Sit to Stand: Partial/moderate assistance  Chair/Bed to Chair Transfer: Partial/moderate assistance  Car Transfer:    Ambulation 10 ft: Partial/moderate assistance  Ambulation 50 ft: Partial/moderate assistance  Ambulation 150 ft: Partial/moderate assistance  Stairs - 1 Step: Dependent  Stairs - 4 Step:    Stairs - 12 Step:      OT    Eating: Independent  Oral Hygiene: Setup or clean-up assistance  Bathing: Partial/moderate assistance  Upper Body Dressing: Setup or clean-up assistance  Lower Body Dressing: Dependent  Toilet Transfer: Dependent  Toilet Hygiene: Dependent    Speech  Counseling/education, community resources at discharge     #. Hypothyroidism  - TSH 12.3, T4 1.1 on 10/31  - Recheck as outpatient     #. HTN  #. Orthostatic hypotension  - HOLD home valsartan 80 mg daily     #. Anxiety  - Continue escitalopram 20 mg daily, and buspirone (increased to 10 TID on 11/9)      #. Left hallux and 4th toe skin necrosis  - Due to vasopressor induced ischemic injury  - Podiatry evaluated, recommended betadine paint daily.      #. FEN  - Hypomagnesemia, magnesium sulfate 2 g IV.   - Hypokalemia, add KCl 20 mEq PO TID w/ meals x3 doses  - Hypocalcemia: Ca 1.8, ionized Ca 1.05. Continue PO calcium carbonate TID w/ meals.    - Severe Vit D deficiency: Continue high-dose weekly supplementation.  - s/p PO zinc repletion, rechecked levels are WNL  - Continue B12 repletion     #. Xerosis  - Aquaphor PRN    #. External hemorrhoids  - Hydrocortisone cream BID x3 days (EOT 11/16)    #. TONYA, resolved  - Required intermittent HD at [facility]    #. Multifocal PNA  - Sepsis resolved  - Completed course of PO Levaquin, last dose 11/8    #. Lumbar spondylosis  - MRI L-Spine: Mild multilevel degenerative disease and facet arthrosis centered at L5-S1 with moderate spinal canal stenosis, moderate left and mild right neural foraminal narrowing at that level.   - Continue PT  - Pain management as below    CODE: Full Code  Diet: Diet NPO Exceptions are: Sips of Water with Meds, Ice Chips  Bowels: Miralax, Senna PRN.   Bladder: Per protocol   Sleep: Melatonin 3 mg nightly PRN, Trazodone 50 mg nightly PRN  Pain: Tylenol 650 mg q4h PRN, lidocaine patch PRN for back pain, gabapentin 400 mg TID  DVT PPx: Fully anticoagulated   Follow-ups: Hematology, Podiatry, PCP  ELOS: 16 days      Chinedu Rosas MD 11/18/2024, 8:38 AM    * This document was created using dictation software.  While all precautions were taken to ensure accuracy, errors may have occurred.  Please disregard any typographical errors.

## 2024-11-18 NOTE — PROGRESS NOTES
[facility] - Inpatient Rehabilitation Department   Phone: [phone]    Physical Therapy    [] Initial Evaluation            [x] Daily Treatment Note         [] Discharge Summary      Patient: Jm Snow   : 1995   MRN: [id number]   Date of Service:  2024  Admitting Diagnosis: Critical illness myopathy  Current Admission Summary: Jm Snow is a 29 y.o. 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  He completed 7 days of empiric antibiotics.  Hospital course was complicated by severe TONYA acute renal failure due to ATN, requiring hemodialysis with now recovered renal function.  He additionally developed acute hypoxic respiratory failure secondary to ARDS and aspiration pneumonia requiring intubation on 10/12.  He was transferred to [facility] MICU on 10/12 for further management. Extubated on 10/18.  Hospital course was further complicated by encephalopathy with agitation and psychosis (episodes of hallucination), suspected multifactorial due to metabolic derangements, active infection and possible alcohol withdrawal, as well as ileus.      Developed tachycardia and fever shortly after ARU admission on 10/30, and was discharged acutely to internal medicine service in acute hospital for further workup and management early morning on 10/31.  He developed hypoxia, requiring intermittent high flow oxygen.  CTA chest showed left upper lobe PE, as well as bilateral pleural effusions and pneumonia.  He was restarted on broad-spectrum IV antibiotics, and ID was consulted. Blood cultures no growth to date. He is now completing course of PO Levaquin. He was started on heparin drip for PE, which has now been discontinued in favor of oral anticoagulation with Eliquis.  GI  directed the patient's plan of care.  Co-signed and supervised by: Chuy Wang PT, DPT - [id number]

## 2024-11-19 ENCOUNTER — APPOINTMENT (OUTPATIENT)
Dept: GENERAL RADIOLOGY | Age: 29
DRG: 091 | End: 2024-11-19
Attending: STUDENT IN AN ORGANIZED HEALTH CARE EDUCATION/TRAINING PROGRAM
Payer: COMMERCIAL

## 2024-11-19 LAB
ANION GAP SERPL CALCULATED.3IONS-SCNC: 11 MMOL/L (ref 3–16)
BUN SERPL-MCNC: <2 MG/DL (ref 7–20)
CALCIUM SERPL-MCNC: 8.1 MG/DL (ref 8.3–10.6)
CHLORIDE SERPL-SCNC: 111 MMOL/L (ref 99–110)
CO2 SERPL-SCNC: 20 MMOL/L (ref 21–32)
CREAT SERPL-MCNC: 0.4 MG/DL (ref 0.9–1.3)
GFR SERPLBLD CREATININE-BSD FMLA CKD-EPI: >90 ML/MIN/{1.73_M2}
GLUCOSE SERPL-MCNC: 92 MG/DL (ref 70–99)
MAGNESIUM SERPL-MCNC: 1.8 MG/DL (ref 1.8–2.4)
POTASSIUM SERPL-SCNC: 3 MMOL/L (ref 3.5–5.1)
SODIUM SERPL-SCNC: 142 MMOL/L (ref 136–145)

## 2024-11-19 PROCEDURE — 6370000000 HC RX 637 (ALT 250 FOR IP): Performed by: STUDENT IN AN ORGANIZED HEALTH CARE EDUCATION/TRAINING PROGRAM

## 2024-11-19 PROCEDURE — 97116 GAIT TRAINING THERAPY: CPT

## 2024-11-19 PROCEDURE — 2580000003 HC RX 258: Performed by: STUDENT IN AN ORGANIZED HEALTH CARE EDUCATION/TRAINING PROGRAM

## 2024-11-19 PROCEDURE — 97530 THERAPEUTIC ACTIVITIES: CPT

## 2024-11-19 PROCEDURE — 80048 BASIC METABOLIC PNL TOTAL CA: CPT

## 2024-11-19 PROCEDURE — 97110 THERAPEUTIC EXERCISES: CPT

## 2024-11-19 PROCEDURE — 1280000000 HC REHAB R&B

## 2024-11-19 PROCEDURE — 97535 SELF CARE MNGMENT TRAINING: CPT

## 2024-11-19 PROCEDURE — 83735 ASSAY OF MAGNESIUM: CPT

## 2024-11-19 PROCEDURE — 74019 RADEX ABDOMEN 2 VIEWS: CPT

## 2024-11-19 RX ORDER — POTASSIUM CHLORIDE 1500 MG/1
20 TABLET, EXTENDED RELEASE ORAL 4 TIMES DAILY
Status: DISCONTINUED | OUTPATIENT
Start: 2024-11-19 | End: 2024-11-20

## 2024-11-19 RX ORDER — VALSARTAN 80 MG/1
40 TABLET ORAL DAILY
Status: DISCONTINUED | OUTPATIENT
Start: 2024-11-19 | End: 2024-11-20

## 2024-11-19 RX ADMIN — POTASSIUM CHLORIDE 20 MEQ: 1500 TABLET, EXTENDED RELEASE ORAL at 12:40

## 2024-11-19 RX ADMIN — ESCITALOPRAM OXALATE 20 MG: 10 TABLET ORAL at 06:36

## 2024-11-19 RX ADMIN — VITAM B12 50 MCG: 100 TAB at 09:19

## 2024-11-19 RX ADMIN — ANTACID TABLETS 500 MG: 500 TABLET, CHEWABLE ORAL at 16:44

## 2024-11-19 RX ADMIN — SODIUM CHLORIDE, PRESERVATIVE FREE 10 ML: 5 INJECTION INTRAVENOUS at 20:40

## 2024-11-19 RX ADMIN — VALSARTAN 40 MG: 80 TABLET, FILM COATED ORAL at 09:26

## 2024-11-19 RX ADMIN — ANTACID TABLETS 500 MG: 500 TABLET, CHEWABLE ORAL at 09:19

## 2024-11-19 RX ADMIN — ANTACID TABLETS 500 MG: 500 TABLET, CHEWABLE ORAL at 12:40

## 2024-11-19 RX ADMIN — ERGOCALCIFEROL 50000 UNITS: 1.25 CAPSULE ORAL at 09:26

## 2024-11-19 RX ADMIN — APIXABAN 5 MG: 5 TABLET, FILM COATED ORAL at 20:40

## 2024-11-19 RX ADMIN — POTASSIUM CHLORIDE 20 MEQ: 1500 TABLET, EXTENDED RELEASE ORAL at 09:19

## 2024-11-19 RX ADMIN — FOLIC ACID 1 MG: 1 TABLET ORAL at 09:19

## 2024-11-19 RX ADMIN — GABAPENTIN 400 MG: 400 CAPSULE ORAL at 12:40

## 2024-11-19 RX ADMIN — GABAPENTIN 400 MG: 400 CAPSULE ORAL at 20:40

## 2024-11-19 RX ADMIN — BUSPIRONE HYDROCHLORIDE 10 MG: 5 TABLET ORAL at 20:40

## 2024-11-19 RX ADMIN — Medication 100 MG: at 09:19

## 2024-11-19 RX ADMIN — BUSPIRONE HYDROCHLORIDE 10 MG: 5 TABLET ORAL at 12:40

## 2024-11-19 RX ADMIN — BUSPIRONE HYDROCHLORIDE 10 MG: 5 TABLET ORAL at 09:19

## 2024-11-19 RX ADMIN — POTASSIUM CHLORIDE 20 MEQ: 1500 TABLET, EXTENDED RELEASE ORAL at 16:44

## 2024-11-19 RX ADMIN — Medication: at 09:29

## 2024-11-19 RX ADMIN — GABAPENTIN 400 MG: 400 CAPSULE ORAL at 06:36

## 2024-11-19 RX ADMIN — Medication: at 20:40

## 2024-11-19 RX ADMIN — APIXABAN 5 MG: 5 TABLET, FILM COATED ORAL at 09:19

## 2024-11-19 RX ADMIN — POTASSIUM CHLORIDE 20 MEQ: 1500 TABLET, EXTENDED RELEASE ORAL at 20:40

## 2024-11-19 RX ADMIN — THERA TABS 1 TABLET: TAB at 09:19

## 2024-11-19 NOTE — PROGRESS NOTES
[facility] - Inpatient Rehabilitation Department   Phone: [phone]    Physical Therapy    [] Initial Evaluation            [x] Daily Treatment Note         [] Discharge Summary      Patient: Jm Snow   : 1995   MRN: [id number]   Date of Service:  2024  Admitting Diagnosis: Critical illness myopathy  Current Admission Summary: Jm Snow is a 29 y.o. 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  He completed 7 days of empiric antibiotics.  Hospital course was complicated by severe TONYA acute renal failure due to ATN, requiring hemodialysis with now recovered renal function.  He additionally developed acute hypoxic respiratory failure secondary to ARDS and aspiration pneumonia requiring intubation on 10/12.  He was transferred to [facility] MICU on 10/12 for further management. Extubated on 10/18.  Hospital course was further complicated by encephalopathy with agitation and psychosis (episodes of hallucination), suspected multifactorial due to metabolic derangements, active infection and possible alcohol withdrawal, as well as ileus.      Developed tachycardia and fever shortly after ARU admission on 10/30, and was discharged acutely to internal medicine service in acute hospital for further workup and management early morning on 10/31.  He developed hypoxia, requiring intermittent high flow oxygen.  CTA chest showed left upper lobe PE, as well as bilateral pleural effusions and pneumonia.  He was restarted on broad-spectrum IV antibiotics, and ID was consulted. Blood cultures no growth to date. He is now completing course of PO Levaquin. He was started on heparin drip for PE, which has now been discontinued in favor of oral anticoagulation with Eliquis.  GI

## 2024-11-19 NOTE — PATIENT CARE CONFERENCE
unless indicated above.     These goals were reviewed with this patient at the time of assessment and Jm Snow is in agreement.    Plan of Care:  Pt to be seen 5 out of 7 days per week per ARU protocol ( 90 minutes with OT)    NUTRITION:  Weight - Scale: 114.8 kg (253 lb) / Body mass index is 30.81 kg/m².    Diet:ADULT DIET; Clear Liquid    Supplements: ADULT ORAL NUTRITION SUPPLEMENT; Lunch, Dinner; Fortified Gelatin Oral Supplement  ADULT ORAL NUTRITION SUPPLEMENT; Breakfast; Clear Liquid Oral Supplement    PO diet downgraded to clear liquid on 11/18 due to diarrhea and concerns for ileus.  Prior to clear liquid diet pt was tolerating regular diet with adequate oral intake.  Pt was receiving Ensure Plus High Protein, however when diet changed to clear liquid, ONS was modified to Ensure Clear and Gelatein.   Please see nutrition note for details.    NURSING:    Risk for Readmission: 24%    Avalos Fall Risk Score: 70  Wounds/Incisions/Ulcers: Wounds to toes on left foot, as well as right groin.  Medication Review: Reviewed daily with patient.  Pain: Managed with and without need for pharmaceutical intervention.  Consultations/Labs/X-rays:    Consultations: Orthopedic Surgery. Gastroenterology.  Labs: BMP with reflex to Mg every Monday, Wednesday, Friday. Additional BMP with reflex to Mg scheduled for 11/19 at 0600. CBC weekly.   X-rays: XR abdomen (11/17). Plan for another XR abdomen on 11/19.    Patient/Family Education provided by team:    Discharge Plan   Estimated Length of Stay:  4 Days  Destination: home health with family assist  Pass:No  Services at Discharge: TBD pending pt progress - ongoing skilled PT/OT services  Equipment at Discharge: if pt d/c's home, pt will require RW, RW basket, reacher, sock aid, long handled sponge - toilet safety frame vs BSC.  W/c pending progress    Patient Goals:  \"I just want to get home so I can walk a little and get around in my wheelchair if I need to.\",  \"to be  Director    Scribe:  Camilo Chavez I, the Rehab Physician, led the above team conference and agree with all decisions made, and approve the established interdisciplinary plan of care as documented within the medical record of Jm Snow.    Chinedu Rosas MD

## 2024-11-19 NOTE — PROGRESS NOTES
Gastroenterology Progress Note    Jm Snow is a 29 y.o. male patient.  Principal Problem:    Critical illness myopathy  Active Problems:    Mild malnutrition (HCC)  Resolved Problems:    * No resolved hospital problems. *      SUBJECTIVE:  Still having diarrhea 2 BM today, no abdominal pain, nausea, vomiting.     ROS:  No fever, chills  No chest pain, palpitations  No SOB, cough  Gastrointestinal ROS: see above    Physical    VITALS:  /75   Pulse (!) 108   Temp 97.9 °F (36.6 °C) (Oral)   Resp 18   Ht 1.93 m (6' 3.98\")   Wt 112.9 kg (249 lb)   SpO2 95%   BMI 30.32 kg/m²   TEMPERATURE:  Current - Temp: 97.9 °F (36.6 °C); Max - Temp  Av.1 °F (36.7 °C)  Min: 97.9 °F (36.6 °C)  Max: 98.2 °F (36.8 °C)    NAD  Abdomen soft, ND, NT,  Bowel sounds normal.    Data    Data Review:    Recent Labs     24  0626   WBC 2.1*   HGB 9.0*   HCT 28.2*   MCV 90.0        Recent Labs     24  0626 24  0533    142   K 3.1* 3.0*   * 111*   CO2 23 20*   BUN 3* <2*   CREATININE 0.4* 0.4*     No results for input(s): \"AST\", \"ALT\", \"BILIDIR\", \"BILITOT\", \"ALKPHOS\" in the last 72 hours.    Invalid input(s): \"ALB\"  No results for input(s): \"LIPASE\", \"AMYLASE\" in the last 72 hours.  No results for input(s): \"PROTIME\", \"INR\" in the last 72 hours.  Invalid input(s): \"PTT\"        ASSESSMENT / PLAN :    Diarrhea -patient with a history of ileus at .  He was seen at last admission to the ICU for possible ileus per imaging although did not have any symptoms of this.  Has been getting laxatives here.  Stools switched from loose to watery over the weekend.  Per chart review he has been on mag oxide here and did receive senna last night.  X-ray yesterday with mild to moderate distention of the colon and mild dilation of small bowel concerning for ileus.  Clinically there is no ileus as he is having bowel movements and no nausea, vomiting or abdominal distention.  -Up to the chair TID

## 2024-11-19 NOTE — PLAN OF CARE
Problem: Discharge Planning  Goal: Discharge to home or other facility with appropriate resources  Outcome: Progressing     Problem: Safety - Adult  Goal: Free from fall injury  Outcome: Progressing     Problem: Pain  Goal: Verbalizes/displays adequate comfort level or baseline comfort level  Outcome: Progressing     Problem: Nutrition Deficit:  Goal: Optimize nutritional status  Outcome: Progressing     Problem: Skin/Tissue Integrity  Goal: Absence of new skin breakdown  Description: 1.  Monitor for areas of redness and/or skin breakdown  2.  Assess vascular access sites hourly  3.  Every 4-6 hours minimum:  Change oxygen saturation probe site  4.  Every 4-6 hours:  If on nasal continuous positive airway pressure, respiratory therapy assess nares and determine need for appliance change or resting period.  Outcome: Progressing     Problem: ABCDS Injury Assessment  Goal: Absence of physical injury  Outcome: Progressing     Problem: Neurosensory - Adult  Goal: Achieves stable or improved neurological status  Outcome: Progressing     Problem: Cardiovascular - Adult  Goal: Maintains optimal cardiac output and hemodynamic stability  Outcome: Progressing     Problem: Skin/Tissue Integrity - Adult  Goal: Skin integrity remains intact  Outcome: Progressing  Flowsheets (Taken 11/18/2024 2045)  Skin Integrity Remains Intact: Monitor for areas of redness and/or skin breakdown     Problem: Musculoskeletal - Adult  Goal: Return mobility to safest level of function  Outcome: Progressing     Problem: Gastrointestinal - Adult  Goal: Minimal or absence of nausea and vomiting  Outcome: Progressing     Problem: Anxiety  Goal: Will report anxiety at manageable levels  Description: INTERVENTIONS:  1. Administer medication as ordered  2. Teach and rehearse alternative coping skills  3. Provide emotional support with 1:1 interaction with staff  Outcome: Progressing     Problem: Coping  Goal: Pt/Family able to verbalize concerns and

## 2024-11-19 NOTE — PROGRESS NOTES
[facility] - Inpatient Rehabilitation Department   Phone: [phone]    Occupational Therapy    [] Initial Evaluation            [x] Daily Treatment Note         [] Discharge Summary      Patient: Jm Snow   : 1995   MRN: [id number]   Date of Service:  2024    Admitting Diagnosis:  Critical illness myopathy  Current Admission Summary:   Jm Snow is a 28 y.o. 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  He completed 7 days of empiric antibiotics.  Hospital course was complicated by severe TONYA acute renal failure due to ATN, requiring hemodialysis with now recovered renal function.  He additionally developed acute hypoxic respiratory failure secondary to ARDS and aspiration pneumonia requiring intubation on 10/12.  He was transferred to [facility] MICU on 10/12 for further management. Extubated on 10/18.  Hospital course was further complicated by encephalopathy with agitation and psychosis (episodes of hallucination), suspected multifactorial due to metabolic derangements, active infection and possible alcohol withdrawal, as well as ileus.      Developed tachycardia and fever shortly after ARU admission on 10/30, and was discharged acutely to internal medicine service in acute hospital for further workup and management early morning on 10/31.  He developed hypoxia, requiring intermittent high flow oxygen.  CTA chest showed left upper lobe PE, as well as bilateral pleural effusions and pneumonia.  He was restarted on broad-spectrum IV antibiotics, and ID was consulted. Blood cultures no growth to date. He is now completing course of PO Levaquin. He was started on heparin drip for PE, which has now been discontinued in favor of oral anticoagulation with  positioning    Goals  Patient Goals: \"to be able to get up and walk\"    Short Term Goals:  Time Frame: 1-2 weeks   Patient will complete lower body ADL at moderate assistance   Patient will complete toileting at moderate assistance   Patient will complete functional transfers at moderate assistance - goal met 11/18  Patient to maintain standing at contact guard assistance for 3 minutes.  Long Term Goals:  To be completed in: 2-3 weeks   Patient will complete upper body ADL at modified independent   Patient will complete lower body ADL at modified independent   Patient will complete toileting at modified independent   Patient will complete functional transfers at modified independent     Above goals reviewed on 11/19/2024.  All goals are ongoing at this time unless indicated above.       Therapy Session Time     Individual Group Co-treatment   Time In   1030   Time Out   1100   Minutes   30      Second Session Therapy Time:   Individual Concurrent Group Co-treatment   Time In 1335      Time Out 1435      Minutes 60        Timed Code Treatment Minutes: 30+60 minutes  Total Treatment Minutes: 90 minutes        Electronically Signed By: CARLY Pak MOT OTR/L, [id number] 11/19/2024 3:21 PM

## 2024-11-19 NOTE — PROGRESS NOTES
Patient alert and oriented, VSS, pt denies pain, head to toe assessment done, medications given per MAR, pt was able to urine in toilet while standing up on the stedy, pt was back in bed, call light within reach, safety precautions in place.

## 2024-11-19 NOTE — PROGRESS NOTES
Jm Snow  11/19/2024  [id number]    Chief Complaint: Critical illness myopathy    Subjective    No acute events overnight.     Continues to have diarrhea overnight, and this morning. Denies any abdominal pain, or nausea. Discussed plan for continuing to replace his electrolytes, and restarting his valsartan at reduced dose and seeing how his blood pressure tolerates.      Last Bowel Movement:  11/19/24        Objective    Patient Vitals for the past 24 hrs:   BP Temp Temp src Pulse Resp SpO2 Weight   11/19/24 0845 111/75 97.9 °F (36.6 °C) Oral (!) 108 18 -- --   11/19/24 0500 -- -- -- -- -- -- 112.9 kg (249 lb)   11/18/24 2045 117/76 98.2 °F (36.8 °C) Oral (!) 114 18 95 % --     Gen: No distress, pleasant.   HEENT: Normocephalic, atraumatic.   CV: Regular rate and rhythm. Extremities warm, well perfused.   Resp: No respiratory distress. CTAB.  Abd: Soft, nontender.  Ext: No edema.   Skin: Lacy red rash involving abdomen and bilateral thighs, appears to be following skin fissures.   Neuro: Alert, oriented, appropriately interactive.     Laboratory data: Available via EMR.     Therapy progress:       PT    Supine to Sit: Supervision or touching assistance  Sit to Supine: Partial/moderate assistance   Sit to Stand: Partial/moderate assistance  Chair/Bed to Chair Transfer: Partial/moderate assistance  Car Transfer:    Ambulation 10 ft: Partial/moderate assistance  Ambulation 50 ft: Partial/moderate assistance  Ambulation 150 ft: Partial/moderate assistance  Stairs - 1 Step: Partial/moderate assistance  Stairs - 4 Step:    Stairs - 12 Step:      OT    Eating: Independent  Oral Hygiene: Setup or clean-up assistance  Bathing: Partial/moderate assistance  Upper Body Dressing: Setup or clean-up assistance  Lower Body Dressing: Partial/moderate assistance  Toilet Transfer: Partial/moderate assistance  Toilet Hygiene: Partial/moderate assistance    Speech Therapy    Pt presents with functional cognitive linguistic

## 2024-11-19 NOTE — CARE COORDINATION
Team conference/Weekly Summary         Team conference held today.  Patient's discharge date is 11/23/24.  He is from home and will most likely be discharging home and living with his parents.  Plan is for patient to discharge home w/assistance from pt's parents whom patient will be living with.  Patient will be able to self care to/from the bathroom upon discharge.  Patient is walking with a walker now.  Pt's mom bought patient a walker and other DME for home.  No DME needed from  at this time.  Confirmed with Ksenia at [facility] that they are able to accept and are following for discharge.     Electronically signed by ARSEN Zambrano LSW on 11/19/2024 at 3:21 PM

## 2024-11-20 LAB
ANION GAP SERPL CALCULATED.3IONS-SCNC: 10 MMOL/L (ref 3–16)
ANION GAP SERPL CALCULATED.3IONS-SCNC: 9 MMOL/L (ref 3–16)
BASOPHILS # BLD: 0 K/UL (ref 0–0.2)
BASOPHILS NFR BLD: 0.6 %
BUN SERPL-MCNC: <2 MG/DL (ref 7–20)
BUN SERPL-MCNC: <2 MG/DL (ref 7–20)
CALCIUM SERPL-MCNC: 8 MG/DL (ref 8.3–10.6)
CALCIUM SERPL-MCNC: 8.1 MG/DL (ref 8.3–10.6)
CHLORIDE SERPL-SCNC: 107 MMOL/L (ref 99–110)
CHLORIDE SERPL-SCNC: 110 MMOL/L (ref 99–110)
CO2 SERPL-SCNC: 20 MMOL/L (ref 21–32)
CO2 SERPL-SCNC: 21 MMOL/L (ref 21–32)
CREAT SERPL-MCNC: 0.4 MG/DL (ref 0.9–1.3)
CREAT SERPL-MCNC: 0.4 MG/DL (ref 0.9–1.3)
DEPRECATED RDW RBC AUTO: 16.6 % (ref 12.4–15.4)
EOSINOPHIL # BLD: 0.1 K/UL (ref 0–0.6)
EOSINOPHIL NFR BLD: 2.8 %
GFR SERPLBLD CREATININE-BSD FMLA CKD-EPI: >90 ML/MIN/{1.73_M2}
GFR SERPLBLD CREATININE-BSD FMLA CKD-EPI: >90 ML/MIN/{1.73_M2}
GLUCOSE SERPL-MCNC: 80 MG/DL (ref 70–99)
GLUCOSE SERPL-MCNC: 91 MG/DL (ref 70–99)
HCT VFR BLD AUTO: 28.9 % (ref 40.5–52.5)
HGB BLD-MCNC: 9.3 G/DL (ref 13.5–17.5)
LYMPHOCYTES # BLD: 0.6 K/UL (ref 1–5.1)
LYMPHOCYTES NFR BLD: 15.9 %
MAGNESIUM SERPL-MCNC: 1.68 MG/DL (ref 1.8–2.4)
MAGNESIUM SERPL-MCNC: 2.03 MG/DL (ref 1.8–2.4)
MCH RBC QN AUTO: 29.5 PG (ref 26–34)
MCHC RBC AUTO-ENTMCNC: 32.1 G/DL (ref 31–36)
MCV RBC AUTO: 92.1 FL (ref 80–100)
MONOCYTES # BLD: 0.5 K/UL (ref 0–1.3)
MONOCYTES NFR BLD: 14 %
NEUTROPHILS # BLD: 2.4 K/UL (ref 1.7–7.7)
NEUTROPHILS NFR BLD: 66.7 %
PLATELET # BLD AUTO: 211 K/UL (ref 135–450)
PMV BLD AUTO: 8.2 FL (ref 5–10.5)
POTASSIUM SERPL-SCNC: 2.8 MMOL/L (ref 3.5–5.1)
POTASSIUM SERPL-SCNC: 3.1 MMOL/L (ref 3.5–5.1)
RBC # BLD AUTO: 3.14 M/UL (ref 4.2–5.9)
SODIUM SERPL-SCNC: 138 MMOL/L (ref 136–145)
SODIUM SERPL-SCNC: 139 MMOL/L (ref 136–145)
WBC # BLD AUTO: 3.5 K/UL (ref 4–11)

## 2024-11-20 PROCEDURE — 6370000000 HC RX 637 (ALT 250 FOR IP): Performed by: STUDENT IN AN ORGANIZED HEALTH CARE EDUCATION/TRAINING PROGRAM

## 2024-11-20 PROCEDURE — 36415 COLL VENOUS BLD VENIPUNCTURE: CPT

## 2024-11-20 PROCEDURE — 97535 SELF CARE MNGMENT TRAINING: CPT

## 2024-11-20 PROCEDURE — 6360000002 HC RX W HCPCS: Performed by: STUDENT IN AN ORGANIZED HEALTH CARE EDUCATION/TRAINING PROGRAM

## 2024-11-20 PROCEDURE — 2580000003 HC RX 258: Performed by: STUDENT IN AN ORGANIZED HEALTH CARE EDUCATION/TRAINING PROGRAM

## 2024-11-20 PROCEDURE — 80048 BASIC METABOLIC PNL TOTAL CA: CPT

## 2024-11-20 PROCEDURE — 97530 THERAPEUTIC ACTIVITIES: CPT

## 2024-11-20 PROCEDURE — 85025 COMPLETE CBC W/AUTO DIFF WBC: CPT

## 2024-11-20 PROCEDURE — 83735 ASSAY OF MAGNESIUM: CPT

## 2024-11-20 PROCEDURE — 97116 GAIT TRAINING THERAPY: CPT

## 2024-11-20 PROCEDURE — 1280000000 HC REHAB R&B

## 2024-11-20 PROCEDURE — 97110 THERAPEUTIC EXERCISES: CPT

## 2024-11-20 RX ORDER — MAGNESIUM SULFATE IN WATER 40 MG/ML
2000 INJECTION, SOLUTION INTRAVENOUS ONCE
Status: COMPLETED | OUTPATIENT
Start: 2024-11-20 | End: 2024-11-20

## 2024-11-20 RX ORDER — VALSARTAN 80 MG/1
80 TABLET ORAL DAILY
Status: DISCONTINUED | OUTPATIENT
Start: 2024-11-21 | End: 2024-11-22 | Stop reason: HOSPADM

## 2024-11-20 RX ADMIN — GABAPENTIN 400 MG: 400 CAPSULE ORAL at 06:16

## 2024-11-20 RX ADMIN — MAGNESIUM SULFATE HEPTAHYDRATE 2000 MG: 40 INJECTION, SOLUTION INTRAVENOUS at 09:40

## 2024-11-20 RX ADMIN — POTASSIUM BICARBONATE 50 MEQ: 978 TABLET, EFFERVESCENT ORAL at 09:20

## 2024-11-20 RX ADMIN — Medication: at 09:22

## 2024-11-20 RX ADMIN — BUSPIRONE HYDROCHLORIDE 10 MG: 5 TABLET ORAL at 13:51

## 2024-11-20 RX ADMIN — ANTACID TABLETS 500 MG: 500 TABLET, CHEWABLE ORAL at 18:37

## 2024-11-20 RX ADMIN — CALCIUM POLYCARBOPHIL 625 MG: 625 TABLET ORAL at 13:51

## 2024-11-20 RX ADMIN — MELATONIN TAB 3 MG 3 MG: 3 TAB at 22:59

## 2024-11-20 RX ADMIN — BUSPIRONE HYDROCHLORIDE 10 MG: 5 TABLET ORAL at 09:21

## 2024-11-20 RX ADMIN — SODIUM CHLORIDE, PRESERVATIVE FREE 10 ML: 5 INJECTION INTRAVENOUS at 09:22

## 2024-11-20 RX ADMIN — Medication 100 MG: at 09:21

## 2024-11-20 RX ADMIN — POTASSIUM BICARBONATE 50 MEQ: 978 TABLET, EFFERVESCENT ORAL at 20:30

## 2024-11-20 RX ADMIN — APIXABAN 5 MG: 5 TABLET, FILM COATED ORAL at 09:21

## 2024-11-20 RX ADMIN — ANTACID TABLETS 500 MG: 500 TABLET, CHEWABLE ORAL at 09:21

## 2024-11-20 RX ADMIN — THERA TABS 1 TABLET: TAB at 09:21

## 2024-11-20 RX ADMIN — GABAPENTIN 400 MG: 400 CAPSULE ORAL at 20:29

## 2024-11-20 RX ADMIN — ESCITALOPRAM OXALATE 20 MG: 10 TABLET ORAL at 06:16

## 2024-11-20 RX ADMIN — VITAM B12 50 MCG: 100 TAB at 09:21

## 2024-11-20 RX ADMIN — APIXABAN 5 MG: 5 TABLET, FILM COATED ORAL at 20:29

## 2024-11-20 RX ADMIN — BUSPIRONE HYDROCHLORIDE 10 MG: 5 TABLET ORAL at 20:29

## 2024-11-20 RX ADMIN — ACETAMINOPHEN 650 MG: 325 TABLET ORAL at 22:59

## 2024-11-20 RX ADMIN — FOLIC ACID 1 MG: 1 TABLET ORAL at 09:21

## 2024-11-20 RX ADMIN — VALSARTAN 40 MG: 80 TABLET, FILM COATED ORAL at 09:21

## 2024-11-20 RX ADMIN — ANTACID TABLETS 500 MG: 500 TABLET, CHEWABLE ORAL at 13:50

## 2024-11-20 RX ADMIN — GABAPENTIN 400 MG: 400 CAPSULE ORAL at 13:51

## 2024-11-20 ASSESSMENT — PAIN SCALES - GENERAL: PAINLEVEL_OUTOF10: 0

## 2024-11-20 NOTE — PROGRESS NOTES
Jm Snow  11/20/2024  [id number]    Chief Complaint: Critical illness myopathy    Subjective    No acute events overnight.     Patient reports that he is doing well. Continues to have loose stool. Denies any abdominal pain, fevers/chills. Patient and family would like to discharge home after therapy on Friday.     Last Bowel Movement:  11/20/24        Objective    Patient Vitals for the past 24 hrs:   BP Temp Temp src Pulse Resp SpO2 Weight   11/20/24 0919 (!) 145/82 98.2 °F (36.8 °C) Oral (!) 107 18 100 % --   11/20/24 0615 113/70 97.5 °F (36.4 °C) Oral (!) 107 18 96 % 113.2 kg (249 lb 9 oz)   11/19/24 2030 123/75 98.1 °F (36.7 °C) Oral (!) 117 16 95 % --     Gen: No distress, pleasant.   HEENT: Normocephalic, atraumatic.   CV: Regular rate and rhythm. Extremities warm, well perfused.   Resp: No respiratory distress. CTAB.  Abd: Soft, nontender.  Ext: No edema.   Skin: Lacy red rash involving abdomen and bilateral thighs, appears to be following skin fissures.   Neuro: Alert, oriented, appropriately interactive.     Laboratory data: Available via EMR.     Therapy progress:       PT    Supine to Sit: Supervision or touching assistance  Sit to Supine: Supervision or touching assistance   Sit to Stand: Supervision or touching assistance  Chair/Bed to Chair Transfer: Supervision or touching assistance  Car Transfer:    Ambulation 10 ft: Supervision or touching assistance  Ambulation 50 ft: Supervision or touching assistance  Ambulation 150 ft: Partial/moderate assistance  Stairs - 1 Step: Partial/moderate assistance  Stairs - 4 Step:    Stairs - 12 Step:      OT    Eating: Independent  Oral Hygiene: Setup or clean-up assistance  Bathing: Partial/moderate assistance  Upper Body Dressing: Setup or clean-up assistance  Lower Body Dressing: Partial/moderate assistance  Toilet Transfer: Partial/moderate assistance  Toilet Hygiene: Partial/moderate assistance    Speech Therapy    Pt presents with functional cognitive

## 2024-11-20 NOTE — PROGRESS NOTES
position  Static Standing Balance: fair (-): maintains balance at SBA/SUP with use of UE support  Dynamic Standing Balance: fair (-): maintains balance at SBA/SUP with use of UE support  Comments:     Other Therapeutic Interventions          Second Session     Pt met seated in recliner upon arrival - pt pleasant and agreeable to therapy.     Sit><stands completed CGA w/ increased time.     Pt amb to/from bathroom SBA w/ significant time d/t increased tightness and pain. Pt completed transfer simulating home environment dimensions utilizing lateral steps for BSC transfer. BSC transfer completed w/ CGA. Environment left in home set up to allow pt extra practice w/ RN over night - verb understanding and agreement from RN and patient.     UE strength and endurance targeted w/ B bicep curls completed 10 reps x1 w/ 5# dumbbells and B chest press 10 reps x1 w/ 3# dumbbells. Pt reporting increased tightness in neck and shoulders. Thus weighted exercises abandoned and stretching completed. Pt completed the following scapular AROM stretches/exercises seated in w/c for 10 reps x1: B scap elevation, B scap retractation, B scap protraction.    Pt voided urine in stance w/ RW over toilet SBA w/ increased time. Clothing management over/under hips in stance w/ SBA.    Toilet transfer to [facility] over toilet completed at EOS - pt required CGA - pt left seated on toilet w/ RN aware and entering room. Pt completed clothing management SBA while in stance w/ RW.         Cognition  WFL  Comments: patient with baseline anxiety   Orientation:    alert and oriented x 4  Command Following:   WFL     Education  Barriers To Learning: none  Patient Education: patient educated on goals, OT role and benefits, plan of care, ADL adaptive strategies, proper use of assistive device/equipment, transfer training, discharge recommendations  Learning Assessment:  patient verbalizes and demonstrates understanding    Assessment  Activity Tolerance: well - con't

## 2024-11-20 NOTE — PROGRESS NOTES
home with heating and cooling   Hobbies: hanging out with cat, used to play soccer, football fan   Recent Falls: none     Available Assistance at Discharge: 24 hr physical assistance available, father or mother     Pt was experiencing leg pain beginning in July 2024 and requiring use of cane for mobility and intermittent assist for mobility at home. Pt was in outpatient PT at [facility] and reported some improvement with PT intervention. Pain and mobility deficits have worsened since July, requiring pt to move into parent's home. Prior to July, pt was IND with all mobility and ADLs, living alone in apartment.     Examination   Vision:   Vision Gross Assessment: WFL  Hearing:   WFL      Subjective  General: Pt supine in bed upon arrival.  Agreeable to PT session. Pt requesting that d/c will be moved to Friday with eagerness to return home. Pt educated of benefits for continued skilled stay - MD notified of request  Pain: 5/10.  Location: (B) LE tightness and N/T    Pain Interventions: therapy activities modified      Functional Mobility  Bed Mobility:  Supine to Sit: stand by assistance  Rolling Left: stand by assistance  Comments: Flat bed without use of HR; grasps mattress and pushes from bed.  Increased time and attempts for completion  Transfers:  Sit to stand transfer: contact guard assistance, (Occasionally requires increased attempts to achieve standing, improved time during transition of hands to walker but still impaired)  Stand to sit transfer: contact guard assistance  Stand pivot transfer: contact guard assistance   Comments: All transfers completed with RW.  TC for forward rocking during stand  Ambulation:  Surface:level surface  Assistive Device: rolling walker  Assistance: contact guard assistance, (self manages AD)  Distance: 152 ft + 10 ft + short distances throughout session  Gait Mechanics: Reciprocal pattern, improving but ongoing reduced sandie, improving (B) foot clearance and (B) knee  strengthening, ROM, balance training, functional mobility training, transfer training, gait training, stair training, endurance training, neuromuscular re-education, wheelchair mobility training, and equipment evaluation/education    Goals  Patient Goals: \"I just want to get home so I can walk a little and get around in my wheelchair if I need to.\"   Short Term Goals:  Time Frame: 14 days  Patient will complete bed mobility at contact guard assistance - Goal met 11/19/2024 (Upgraded below)  Patient will complete transfers at minimal assistance - Goal met 11/19/2024  Patient will ambulate 50 ft with use of rolling walker at minimal assistance - Goal met 11/19/2024  Patient will ascend/descend 1 stairs without use of HR at maximum assistance - Goal met 11/20/2024   Patient will complete car transfer at minimal assistance  Patient will complete manual w/c propulsion 150 ft at modified independent - Goal d/c due to transition to  for primary mobility  Patient to maintain standing at minimal assistance for 5 minutes. - Goal met 11/19/2024 within ambulation trial.    Patient will complete bed mobility at modified independent level  Patient will complete transfers at modified independent level  Patient will ambulate 50 ft with use of rolling walker at modified independent level    Above goals reviewed on 11/20/2024.  All goals are ongoing at this time unless indicated above.      Therapy Session Time      Individual Group Co-treatment   Time In 0730      Time Out 0830      Minutes 60          Second Session Therapy Time:   Individual Group Co-treatment   Time In 1020      Time Out 1100      Minutes 40          Total Treatment Minutes: 100 minutes     Electronically Signed By: DANIEL Gates  Therapist observed and directed the patient's plan of care.  Co-signed and supervised by: Chuy Wang PT, DPT - [id number]

## 2024-11-21 LAB
ANION GAP SERPL CALCULATED.3IONS-SCNC: 12 MMOL/L (ref 3–16)
BUN SERPL-MCNC: <2 MG/DL (ref 7–20)
CALCIUM SERPL-MCNC: 7.9 MG/DL (ref 8.3–10.6)
CHLORIDE SERPL-SCNC: 106 MMOL/L (ref 99–110)
CO2 SERPL-SCNC: 18 MMOL/L (ref 21–32)
CREAT SERPL-MCNC: 0.4 MG/DL (ref 0.9–1.3)
GFR SERPLBLD CREATININE-BSD FMLA CKD-EPI: >90 ML/MIN/{1.73_M2}
GLUCOSE SERPL-MCNC: 92 MG/DL (ref 70–99)
POTASSIUM SERPL-SCNC: 3.6 MMOL/L (ref 3.5–5.1)
SODIUM SERPL-SCNC: 136 MMOL/L (ref 136–145)

## 2024-11-21 PROCEDURE — 6370000000 HC RX 637 (ALT 250 FOR IP): Performed by: STUDENT IN AN ORGANIZED HEALTH CARE EDUCATION/TRAINING PROGRAM

## 2024-11-21 PROCEDURE — 97110 THERAPEUTIC EXERCISES: CPT

## 2024-11-21 PROCEDURE — 1280000000 HC REHAB R&B

## 2024-11-21 PROCEDURE — 97116 GAIT TRAINING THERAPY: CPT

## 2024-11-21 PROCEDURE — 97530 THERAPEUTIC ACTIVITIES: CPT

## 2024-11-21 PROCEDURE — 80048 BASIC METABOLIC PNL TOTAL CA: CPT

## 2024-11-21 PROCEDURE — 36415 COLL VENOUS BLD VENIPUNCTURE: CPT

## 2024-11-21 PROCEDURE — 97535 SELF CARE MNGMENT TRAINING: CPT

## 2024-11-21 RX ADMIN — ANTACID TABLETS 500 MG: 500 TABLET, CHEWABLE ORAL at 13:39

## 2024-11-21 RX ADMIN — FOLIC ACID 1 MG: 1 TABLET ORAL at 11:10

## 2024-11-21 RX ADMIN — ANTACID TABLETS 500 MG: 500 TABLET, CHEWABLE ORAL at 11:10

## 2024-11-21 RX ADMIN — ACETAMINOPHEN 650 MG: 325 TABLET ORAL at 16:50

## 2024-11-21 RX ADMIN — Medication 100 MG: at 11:21

## 2024-11-21 RX ADMIN — VITAM B12 50 MCG: 100 TAB at 11:12

## 2024-11-21 RX ADMIN — ESCITALOPRAM OXALATE 20 MG: 10 TABLET ORAL at 07:07

## 2024-11-21 RX ADMIN — Medication: at 11:18

## 2024-11-21 RX ADMIN — POTASSIUM BICARBONATE 25 MEQ: 978 TABLET, EFFERVESCENT ORAL at 20:58

## 2024-11-21 RX ADMIN — THERA TABS 1 TABLET: TAB at 11:10

## 2024-11-21 RX ADMIN — VALSARTAN 80 MG: 80 TABLET, FILM COATED ORAL at 11:10

## 2024-11-21 RX ADMIN — GABAPENTIN 400 MG: 400 CAPSULE ORAL at 20:58

## 2024-11-21 RX ADMIN — CALCIUM POLYCARBOPHIL 625 MG: 625 TABLET ORAL at 11:18

## 2024-11-21 RX ADMIN — APIXABAN 5 MG: 5 TABLET, FILM COATED ORAL at 11:11

## 2024-11-21 RX ADMIN — MELATONIN TAB 3 MG 3 MG: 3 TAB at 20:58

## 2024-11-21 RX ADMIN — BUSPIRONE HYDROCHLORIDE 10 MG: 5 TABLET ORAL at 11:10

## 2024-11-21 RX ADMIN — ACETAMINOPHEN 650 MG: 325 TABLET ORAL at 20:58

## 2024-11-21 RX ADMIN — GABAPENTIN 400 MG: 400 CAPSULE ORAL at 07:08

## 2024-11-21 RX ADMIN — BUSPIRONE HYDROCHLORIDE 10 MG: 5 TABLET ORAL at 20:58

## 2024-11-21 RX ADMIN — POTASSIUM BICARBONATE 25 MEQ: 978 TABLET, EFFERVESCENT ORAL at 13:39

## 2024-11-21 RX ADMIN — APIXABAN 5 MG: 5 TABLET, FILM COATED ORAL at 20:58

## 2024-11-21 RX ADMIN — BUSPIRONE HYDROCHLORIDE 10 MG: 5 TABLET ORAL at 13:39

## 2024-11-21 RX ADMIN — GABAPENTIN 400 MG: 400 CAPSULE ORAL at 13:38

## 2024-11-21 ASSESSMENT — PAIN SCALES - GENERAL
PAINLEVEL_OUTOF10: 5
PAINLEVEL_OUTOF10: 2
PAINLEVEL_OUTOF10: 0
PAINLEVEL_OUTOF10: 3

## 2024-11-21 ASSESSMENT — PAIN - FUNCTIONAL ASSESSMENT: PAIN_FUNCTIONAL_ASSESSMENT: ACTIVITIES ARE NOT PREVENTED

## 2024-11-21 NOTE — PROGRESS NOTES
Jm Snow  11/21/2024  [id number]    Chief Complaint: Critical illness myopathy    Subjective    No acute events overnight.     Patient reports that he is doing well today. Having some more formed bowel movements, and less frequently overall. Denies any abdominal pain. Reports some worsened bilateral foot pain/paresthesia.    Last Bowel Movement:  11/21/24        Objective    Patient Vitals for the past 24 hrs:   BP Temp Temp src Pulse Resp SpO2   11/20/24 2015 121/77 98.7 °F (37.1 °C) Oral (!) 105 18 95 %     Gen: No distress, pleasant.   HEENT: Normocephalic, atraumatic.   CV: Regular rate and rhythm. Extremities warm, well perfused.   Resp: No respiratory distress. CTAB.  Abd: Soft, nontender.  Ext: No edema.   Neuro: Alert, oriented, appropriately interactive.     Laboratory data: Available via EMR.     Therapy progress:       PT    Supine to Sit: Supervision or touching assistance  Sit to Supine: Supervision or touching assistance   Sit to Stand: Supervision or touching assistance  Chair/Bed to Chair Transfer: Supervision or touching assistance  Car Transfer: Partial/moderate assistance  Ambulation 10 ft: Supervision or touching assistance  Ambulation 50 ft: Supervision or touching assistance  Ambulation 150 ft: Supervision or touching assistance  Stairs - 1 Step: Supervision or touching assistance  Stairs - 4 Step: Partial/moderate assistance  Stairs - 12 Step:      OT    Eating: Independent  Oral Hygiene: Setup or clean-up assistance  Bathing: Partial/moderate assistance  Upper Body Dressing: Setup or clean-up assistance  Lower Body Dressing: Partial/moderate assistance  Toilet Transfer: Partial/moderate assistance  Toilet Hygiene: Partial/moderate assistance    Speech Therapy    Pt presents with functional cognitive linguistic skills, receptive and expressive language and motor speech. Pt with spastic movements but did not impact speech quality nor intelligibility. Pt with mild errors in short term

## 2024-11-21 NOTE — CARE COORDINATION
SW informed that pt's discharge date was moved up to tomorrow.  Patient is eager to get home and parents are in agreement with this plan.  Therapy will do a reduced therapy session that day and patient will discharge in the late afternoon or evening.  Called Ksenia david/[facility] and informed of this change.  Left her a message.    Electronically signed by ARSEN Zambrano, AVRIL on 11/21/2024 at 4:10 PM

## 2024-11-21 NOTE — PROGRESS NOTES
Nutrition Note    RECOMMENDATIONS  PO Diet: Regular  ONS: Ensure Plus HP TID     NUTRITION ASSESSMENT   Nutrition intervention triggered for f/u.  Pt receives a regular diet with limited po intake documented per EMR.  Family often provides food from outside as well.  Pt receives Ensure TID, but does not take consistently.  Will con't to provide with meals.  Wt down 4 lb from last week; noted -5.7L per I/O's. Will con't to monitor & encourage po & supplement intake.     Nutrition Related Findings: LBM 11/21; k+3.6 (pharm replacing d/t previously low)  -5.7L. MV & thiamine daily, Edema: +2 nonpitting, generalized, nonpitting BUE, +2 pitting BLE.  Wounds: Skin Tears, Unstageable  Nutrition Education:  Education/Counseling not indicated   Nutrition Goals: PO intake 75% or greater, by next RD assessment     MALNUTRITION ASSESSMENT   Acute Illness  Malnutrition Status: Mild malnutrition (met criteria a week ago; nutrition status now stable.)    NUTRITION DIAGNOSIS   Increased nutrient needs related to increase demand for energy/nutrients as evidenced by wounds, rehab for strength and conditioning      CURRENT NUTRITION THERAPIES  ADULT DIET; Regular  ADULT ORAL NUTRITION SUPPLEMENT; Breakfast, Lunch, Dinner; Standard High Calorie/High Protein Oral Supplement     PO Intake: 26-50% (x1 recent meal)   PO Supplement Intake:% (x1 ONS recorded)    ANTHROPOMETRICS  Current Height: 193 cm (6' 3.98\")  Current Weight - Scale: 113.2 kg (249 lb 9 oz)    Admission weight: 114.8 kg (253 lb 1.4 oz)  Ideal Body Weight (IBW): 202 lbs  (92 kg)    BMI: 30.3      COMPARATIVE STANDARDS  Total Energy Requirements (kcals/day): 0881-7645     Protein (g):  110-184       Fluid (mL/day):  6620-1878    The patient will be monitored per nutrition standards of care. Consult dietitian if additional nutrition interventions are needed prior to RD reassessment.     Margo Rooney RD, LD    Contact: [phone]

## 2024-11-21 NOTE — PROGRESS NOTES
[facility] - Inpatient Rehabilitation Department   Phone: [phone]     Occupational Therapy     [] Initial Evaluation                          [x] Daily Treatment Note                      [] Discharge Summary        Patient: Jm Snow                                : 1995                                  MRN: [id number]                                    Date of Service:  2024     Admitting Diagnosis:  Critical illness myopathy  Current Admission Summary:   Jm Snow is a 28 y.o. male with PMHx notable for EtOH use disorder, anxiety and HTN who [place] [facility] [place]  with sepsis on , found to have fungenemia of unclear source, possibly secondary to bilateral hip AVN with septic arthritis (however, left hip aspirate cultures with no growth).  He completed 7 days of empiric antibiotics.  Hospital course was complicated by severe TONYA acute renal failure due to ATN, requiring hemodialysis with now recovered renal function.  He additionally developed acute hypoxic respiratory failure secondary to ARDS and aspiration pneumonia requiring intubation on 10/12.  He was transferred to [facility] MICU on 10/12 for further management. Extubated on 10/18.  Hospital course was further complicated by encephalopathy with agitation and psychosis (episodes of hallucination), suspected multifactorial due to metabolic derangements, active infection and possible alcohol withdrawal, as well as ileus.      Developed tachycardia and fever shortly after ARU admission on 10/30, and was discharged acutely to internal medicine service in acute hospital for further workup and management early morning on 10/31.  He developed hypoxia, requiring intermittent high flow oxygen.  CTA chest showed left upper lobe PE, as well as bilateral pleural effusions and pneumonia.  He was restarted on broad-spectrum IV antibiotics, and ID was consulted. Blood cultures no growth to date. He is now completing course of PO  assistive device/equipment, transfer training, discharge recommendations  Learning Assessment:  patient verbalizes and demonstrates understanding     Assessment  Activity Tolerance: well - con't to be limited by pain and decreased ROM of B knees  Impairments Requiring Therapeutic Intervention: decreased functional mobility, decreased ADL status, decreased strength, decreased endurance, decreased balance, decreased IADL, decreased coordination, decreased posture  Prognosis: good  Clinical Assessment: Pt working hard w/ therapy. Pt con't to require maxA from standard toilet surface for transfers - will con't to practice transfers strictly from standard [facility] d/t pt having this at d/c. Pt bathroom set up con't to be obstacle for home - actively simulating, practicing, and problem solving at this time. Pt completed transfer simulating this environment w/ CGA to/from [facility]. Predominately limited by global weakness, decreased endurance and anxiety. Continued skilled occupational therapy services recommended to facilitate return to PLOF and increase independence. Con't per POC.   Safety Interventions: patient left in bed, bed alarm in place, call light within reach, patient at risk for falls, and nurse notified     Plan  Frequency: 5 x/week, 90 min/day  Current Treatment Recommendations: strengthening, balance training, functional mobility training, transfer training, endurance training, patient/caregiver education, ADL/self-care training, equipment evaluation/education, and positioning     Goals  Patient Goals: \"to be able to get up and walk\"    Short Term Goals:  Time Frame: 1-2 weeks   Patient will complete lower body ADL at moderate assistance - goal met 11/19  Patient will complete toileting at moderate assistance - goal met 11/19  Patient will complete functional transfers at moderate assistance - goal met 11/18  Patient to maintain standing at contact guard assistance for 3 minutes. - goal met 11/20 during grooming

## 2024-11-21 NOTE — PROGRESS NOTES
was consulted for mild ileus, managed with NG tube decompression.  Podiatry was consulted for lesions of the left hallux and fourth toe, felt to be eschar from pressor-induced tissue necrosis.   Past Medical History:  has a past medical history of Acute anxiety, Acute HFrEF (heart failure with reduced ejection fraction) ([facility]), TONYA (acute kidney injury) ([facility]), Anasarca, ARDS (adult respiratory distress syndrome), ATN (acute tubular necrosis) ([facility]), Constipation, ETOH abuse, Fungemia, Ileus (HCC), Prolonged Q-T interval on ECG, and Septic shock ([facility]).  Past Surgical History:  has no past surgical history on file.  Discharge Recommendations: Home with family assist PRN, home health therapy  DME Required For Discharge: rolling walker, wheelchair (pt has self attained both devices)  Precautions/Restrictions: high fall risk  Weight Bearing Restrictions: no restrictions    Required Braces/Orthotics: no braces required  Positional Restrictions:no positional restrictions    Pre-Admission Information   Lives With: family (pt no longer has his own apartment and plans to live with parents at d/c)                 Type of Home: house  Home Layout: two level, laundry in basement, bedroom/bathroom upstairs, able to live on main level (plans for this until he can do stairs), 12 stairs to 2nd level with L and half on R HR  Home Access:  1 step to enter without rails from garage  Bathroom Layout: tub/shower unit  Bathroom Equipment:  none  Toilet Height: standard height  Home Equipment: quad cane, Uses in the right hand   Transfer Assistance: Independent without use of device  Ambulation Assistance:Independent without use of device  ADL Assistance: independent with all ADL's  IADL Assistance: independent with homemaking tasks  Active :        [] Yes                 [x] No; hasn't driven in a few months  Hand Dominance: [] Left                 [x] Right  Current Employment: currently unemployed; last year, pt was working from  assistance  Patient will complete manual w/c propulsion 150 ft at modified independent - Goal d/c due to transition to  for primary mobility  Patient to maintain standing at minimal assistance for 5 minutes. - Goal met 11/19/2024 within ambulation trial.    Patient will complete bed mobility at modified independent level - Goal Met 11/21/2024  Patient will complete transfers at modified independent level  Patient will ambulate 50 ft with use of rolling walker at modified independent level    Above goals reviewed on 11/21/2024.  All goals are ongoing at this time unless indicated above.      Therapy Session Time      Individual Group Co-treatment   Time In 0730      Time Out 0830      Minutes 60          Second Session Therapy Time:   Individual Group Co-treatment   Time In 1245      Time Out 1325      Minutes 40          Total Treatment Minutes: 100 minutes     Electronically Signed By: Chuy Wang PT

## 2024-11-21 NOTE — PLAN OF CARE
Problem: Discharge Planning  Goal: Discharge to home or other facility with appropriate resources  Outcome: Progressing     Problem: Safety - Adult  Goal: Free from fall injury  Outcome: Progressing     Problem: Pain  Goal: Verbalizes/displays adequate comfort level or baseline comfort level  Outcome: Progressing     Problem: Nutrition Deficit:  Goal: Optimize nutritional status  Outcome: Progressing     Problem: Skin/Tissue Integrity  Goal: Absence of new skin breakdown  Description: 1.  Monitor for areas of redness and/or skin breakdown  2.  Assess vascular access sites hourly  3.  Every 4-6 hours minimum:  Change oxygen saturation probe site  4.  Every 4-6 hours:  If on nasal continuous positive airway pressure, respiratory therapy assess nares and determine need for appliance change or resting period.  Outcome: Progressing     Problem: ABCDS Injury Assessment  Goal: Absence of physical injury  Outcome: Progressing     Problem: Neurosensory - Adult  Goal: Achieves stable or improved neurological status  Outcome: Progressing     Problem: Cardiovascular - Adult  Goal: Maintains optimal cardiac output and hemodynamic stability  Outcome: Progressing     Problem: Skin/Tissue Integrity - Adult  Goal: Skin integrity remains intact  Outcome: Progressing     Problem: Musculoskeletal - Adult  Goal: Return mobility to safest level of function  Outcome: Progressing     Problem: Gastrointestinal - Adult  Goal: Minimal or absence of nausea and vomiting  Outcome: Progressing     Problem: Anxiety  Goal: Will report anxiety at manageable levels  Description: INTERVENTIONS:  1. Administer medication as ordered  2. Teach and rehearse alternative coping skills  3. Provide emotional support with 1:1 interaction with staff  Outcome: Progressing     Problem: Coping  Goal: Pt/Family able to verbalize concerns and demonstrate effective coping strategies  Description: INTERVENTIONS:  1. Assist patient/family to identify coping skills,  available support systems and cultural and spiritual values  2. Provide emotional support, including active listening and acknowledgement of concerns of patient and caregivers  3. Reduce environmental stimuli, as able  4. Instruct patient/family in relaxation techniques, as appropriate  5. Assess for spiritual pain/suffering and initiate Spiritual Care, Psychosocial Clinical Specialist consults as needed  Outcome: Progressing

## 2024-11-22 VITALS
DIASTOLIC BLOOD PRESSURE: 69 MMHG | HEIGHT: 76 IN | OXYGEN SATURATION: 99 % | RESPIRATION RATE: 16 BRPM | WEIGHT: 244.8 LBS | TEMPERATURE: 98 F | SYSTOLIC BLOOD PRESSURE: 115 MMHG | HEART RATE: 103 BPM | BODY MASS INDEX: 29.81 KG/M2

## 2024-11-22 LAB
ANION GAP SERPL CALCULATED.3IONS-SCNC: 9 MMOL/L (ref 3–16)
BUN SERPL-MCNC: <2 MG/DL (ref 7–20)
CALCIUM SERPL-MCNC: 8 MG/DL (ref 8.3–10.6)
CHLORIDE SERPL-SCNC: 110 MMOL/L (ref 99–110)
CO2 SERPL-SCNC: 22 MMOL/L (ref 21–32)
CREAT SERPL-MCNC: 0.4 MG/DL (ref 0.9–1.3)
GFR SERPLBLD CREATININE-BSD FMLA CKD-EPI: >90 ML/MIN/{1.73_M2}
GLUCOSE SERPL-MCNC: 78 MG/DL (ref 70–99)
MAGNESIUM SERPL-MCNC: 1.65 MG/DL (ref 1.8–2.4)
POTASSIUM SERPL-SCNC: 3.2 MMOL/L (ref 3.5–5.1)
SODIUM SERPL-SCNC: 141 MMOL/L (ref 136–145)

## 2024-11-22 PROCEDURE — 97116 GAIT TRAINING THERAPY: CPT

## 2024-11-22 PROCEDURE — 97530 THERAPEUTIC ACTIVITIES: CPT

## 2024-11-22 PROCEDURE — 6370000000 HC RX 637 (ALT 250 FOR IP): Performed by: STUDENT IN AN ORGANIZED HEALTH CARE EDUCATION/TRAINING PROGRAM

## 2024-11-22 PROCEDURE — 97535 SELF CARE MNGMENT TRAINING: CPT

## 2024-11-22 PROCEDURE — 80048 BASIC METABOLIC PNL TOTAL CA: CPT

## 2024-11-22 PROCEDURE — 36415 COLL VENOUS BLD VENIPUNCTURE: CPT

## 2024-11-22 PROCEDURE — 83735 ASSAY OF MAGNESIUM: CPT

## 2024-11-22 RX ORDER — ERGOCALCIFEROL 1.25 MG/1
50000 CAPSULE ORAL WEEKLY
Qty: 5 CAPSULE | Refills: 0 | Status: SHIPPED | OUTPATIENT
Start: 2024-11-26

## 2024-11-22 RX ORDER — FOLIC ACID 1 MG/1
1 TABLET ORAL DAILY
Qty: 30 TABLET | Refills: 0 | Status: SHIPPED | OUTPATIENT
Start: 2024-11-22

## 2024-11-22 RX ORDER — THIAMINE MONONITRATE (VIT B1) 100 MG
100 TABLET ORAL DAILY
Qty: 30 TABLET | Refills: 0 | Status: SHIPPED | OUTPATIENT
Start: 2024-11-22

## 2024-11-22 RX ORDER — BUSPIRONE HYDROCHLORIDE 10 MG/1
10 TABLET ORAL 3 TIMES DAILY
Qty: 30 TABLET | Refills: 0 | Status: SHIPPED | OUTPATIENT
Start: 2024-11-22

## 2024-11-22 RX ORDER — GABAPENTIN 400 MG/1
400 CAPSULE ORAL EVERY 8 HOURS SCHEDULED
Qty: 90 CAPSULE | Refills: 0 | Status: SHIPPED | OUTPATIENT
Start: 2024-11-22 | End: 2024-12-22

## 2024-11-22 RX ORDER — CALCIUM CARBONATE 500 MG/1
500 TABLET, CHEWABLE ORAL
Qty: 90 TABLET | Refills: 0 | Status: SHIPPED | OUTPATIENT
Start: 2024-11-22 | End: 2024-12-22

## 2024-11-22 RX ORDER — LANOLIN ALCOHOL/MO/W.PET/CERES
400 CREAM (GRAM) TOPICAL DAILY
Qty: 7 TABLET | Refills: 0 | Status: SHIPPED | OUTPATIENT
Start: 2024-11-22

## 2024-11-22 RX ORDER — LANOLIN ALCOHOL/MO/W.PET/CERES
400 CREAM (GRAM) TOPICAL DAILY
Status: DISCONTINUED | OUTPATIENT
Start: 2024-11-22 | End: 2024-11-22 | Stop reason: HOSPADM

## 2024-11-22 RX ADMIN — Medication 100 MG: at 09:03

## 2024-11-22 RX ADMIN — GABAPENTIN 400 MG: 400 CAPSULE ORAL at 06:07

## 2024-11-22 RX ADMIN — CALCIUM POLYCARBOPHIL 625 MG: 625 TABLET, FILM COATED ORAL at 09:04

## 2024-11-22 RX ADMIN — ANTACID TABLETS 500 MG: 500 TABLET, CHEWABLE ORAL at 13:31

## 2024-11-22 RX ADMIN — Medication 400 MG: at 09:03

## 2024-11-22 RX ADMIN — GABAPENTIN 400 MG: 400 CAPSULE ORAL at 13:31

## 2024-11-22 RX ADMIN — APIXABAN 5 MG: 5 TABLET, FILM COATED ORAL at 09:03

## 2024-11-22 RX ADMIN — ESCITALOPRAM OXALATE 20 MG: 10 TABLET ORAL at 06:07

## 2024-11-22 RX ADMIN — THERA TABS 1 TABLET: TAB at 09:03

## 2024-11-22 RX ADMIN — ACETAMINOPHEN 650 MG: 325 TABLET ORAL at 06:07

## 2024-11-22 RX ADMIN — POTASSIUM BICARBONATE 50 MEQ: 978 TABLET, EFFERVESCENT ORAL at 09:02

## 2024-11-22 RX ADMIN — BUSPIRONE HYDROCHLORIDE 10 MG: 5 TABLET ORAL at 09:02

## 2024-11-22 RX ADMIN — BUSPIRONE HYDROCHLORIDE 10 MG: 5 TABLET ORAL at 13:31

## 2024-11-22 RX ADMIN — VITAM B12 50 MCG: 100 TAB at 09:02

## 2024-11-22 RX ADMIN — ANTACID TABLETS 500 MG: 500 TABLET, CHEWABLE ORAL at 09:03

## 2024-11-22 RX ADMIN — FOLIC ACID 1 MG: 1 TABLET ORAL at 09:03

## 2024-11-22 ASSESSMENT — PAIN DESCRIPTION - ORIENTATION: ORIENTATION: RIGHT;LEFT

## 2024-11-22 ASSESSMENT — PAIN SCALES - GENERAL
PAINLEVEL_OUTOF10: 3
PAINLEVEL_OUTOF10: 5

## 2024-11-22 ASSESSMENT — PAIN DESCRIPTION - LOCATION: LOCATION: LEG

## 2024-11-22 NOTE — PROGRESS NOTES
Patient doing well thus far, in good spirits for discharge tomorrow. Assisted to ambulate to and from restroom x2 using walker and gait belt, tolerated well with steady gait. Continent of urine and stool. C/O general discomfort to his feet and lower extremities, requesting Tylenol at HS. Resting in bed with eyes closed at this time, call light and bedside table in reach, and bed alarm on.

## 2024-11-22 NOTE — PROGRESS NOTES
Patient will complete car transfer at minimal assistance - Goal not met  Patient will complete manual w/c propulsion 150 ft at modified independent - Goal d/c due to transition to  for primary mobility  Patient to maintain standing at minimal assistance for 5 minutes. - Goal met 11/19/2024 within ambulation trial.    Patient will complete bed mobility at modified independent level - Goal Met 11/21/2024  Patient will complete transfers at modified independent level - Goal not met  Patient will ambulate 50 ft with use of rolling walker at modified independent level - Goal Met 11/22/2024      Therapy Session Time      Individual Group Co-treatment   Time In 0730      Time Out 0826      Minutes 56        Total Treatment Minutes: 56 minutes     Electronically Signed By: Chuy Wang PT

## 2024-11-22 NOTE — CARE COORDINATION
Case Management -  Discharge Note      Patient Name: Jm Snow                   YOB: 1995            Readmission Risk (Low < 19, Mod (19-27), High > 27): Readmission Risk Score: 22.7    Current PCP: No primary care provider on file.      (IMM) Important Message from Medicare:    Has pt received appropriate compliance notices before being discharged if required: N/A  Date: 11/22/24    Patient/patient representative has been educated on the benefits of [facility] as well as the possible risks of declining recommended services. Patient/patient representative has acknowledged the information provided and decided on the following discharge plan. Patient/ patient representative has been provided freedom of choice regarding service provider, supported by basic dialogue that supports the patient's individualized plan of care/goals.    [facility]  [address], [address]  [place], [place] [address]  Phone: [phone]  Fax:  [phone]       [facility] agency notified of discharge:  [x] Yes [] No  [] NA    Family notified of discharge:  [x] Yes  [] No  [] NA    Facility notified of discharge:  [] Yes  [] No  [x] NA     Financial    Payor: [employer] / Plan: [employer] - CHOICE PLUS / Product Type: *No Product type* /     Pharmacy:  Potential assistance Purchasing Medications: No  Meds-to-Beds request:        [facility]/pharmacy #[id number] - [place], [place] - [address] - P [phone] - F [phone]  [address] [address]  Phone: [phone] Fax: [phone]      Notes:    Additional Case Management Notes:  Patient is discharging home today w/family and [facility].  Family requested that the parents be called for scheduling.  Called Ksenia w/[facility] and informed this information along with discharge today.  Family to transport home.  All needs met.    Electronically signed by ARSEN Zambrano, LSW on 11/22/2024 at 12:27

## 2024-11-22 NOTE — PROGRESS NOTES
Reviewed AVS with patient and his parents. All questions answered, verbalized understanding and patient D/C to home with parents.

## 2024-11-22 NOTE — PLAN OF CARE
Problem: Discharge Planning  Goal: Discharge to home or other facility with appropriate resources  Outcome: Completed     Problem: Safety - Adult  Goal: Free from fall injury  Outcome: Completed     Problem: Pain  Goal: Verbalizes/displays adequate comfort level or baseline comfort level  Outcome: Completed     Problem: Nutrition Deficit:  Goal: Optimize nutritional status  Outcome: Completed     Problem: Skin/Tissue Integrity  Goal: Absence of new skin breakdown  Description: 1.  Monitor for areas of redness and/or skin breakdown  2.  Assess vascular access sites hourly  3.  Every 4-6 hours minimum:  Change oxygen saturation probe site  4.  Every 4-6 hours:  If on nasal continuous positive airway pressure, respiratory therapy assess nares and determine need for appliance change or resting period.  Outcome: Completed     Problem: ABCDS Injury Assessment  Goal: Absence of physical injury  Outcome: Completed     Problem: Neurosensory - Adult  Goal: Achieves stable or improved neurological status  Outcome: Completed     Problem: Cardiovascular - Adult  Goal: Maintains optimal cardiac output and hemodynamic stability  Outcome: Completed     Problem: Skin/Tissue Integrity - Adult  Goal: Skin integrity remains intact  Outcome: Completed     Problem: Musculoskeletal - Adult  Goal: Return mobility to safest level of function  Outcome: Completed     Problem: Gastrointestinal - Adult  Goal: Minimal or absence of nausea and vomiting  Outcome: Completed     Problem: Anxiety  Goal: Will report anxiety at manageable levels  Description: INTERVENTIONS:  1. Administer medication as ordered  2. Teach and rehearse alternative coping skills  3. Provide emotional support with 1:1 interaction with staff  Outcome: Completed     Problem: Coping  Goal: Pt/Family able to verbalize concerns and demonstrate effective coping strategies  Description: INTERVENTIONS:  1. Assist patient/family to identify coping skills, available support systems

## 2024-11-22 NOTE — PLAN OF CARE
Problem: Discharge Planning  Goal: Discharge to home or other facility with appropriate resources  Outcome: Progressing     Problem: Safety - Adult  Goal: Free from fall injury  Outcome: Progressing     Problem: Pain  Goal: Verbalizes/displays adequate comfort level or baseline comfort level  Outcome: Progressing     Problem: Nutrition Deficit:  Goal: Optimize nutritional status  Outcome: Progressing  Flowsheets (Taken 11/21/2024 1202 by Margo Rooney, RD, LD)  Nutrient intake appropriate for improving, restoring, or maintaining nutritional needs:   Monitor oral intake, labs, and treatment plans   Recommend appropriate diets, oral nutritional supplements, and vitamin/mineral supplements     Problem: Skin/Tissue Integrity  Goal: Absence of new skin breakdown  Description: 1.  Monitor for areas of redness and/or skin breakdown  2.  Assess vascular access sites hourly  3.  Every 4-6 hours minimum:  Change oxygen saturation probe site  4.  Every 4-6 hours:  If on nasal continuous positive airway pressure, respiratory therapy assess nares and determine need for appliance change or resting period.  Outcome: Progressing     Problem: ABCDS Injury Assessment  Goal: Absence of physical injury  Outcome: Progressing     Problem: Neurosensory - Adult  Goal: Achieves stable or improved neurological status  Outcome: Progressing     Problem: Cardiovascular - Adult  Goal: Maintains optimal cardiac output and hemodynamic stability  Outcome: Progressing     Problem: Musculoskeletal - Adult  Goal: Return mobility to safest level of function  Outcome: Progressing     Problem: Gastrointestinal - Adult  Goal: Minimal or absence of nausea and vomiting  Outcome: Progressing     Problem: Anxiety  Goal: Will report anxiety at manageable levels  Description: INTERVENTIONS:  1. Administer medication as ordered  2. Teach and rehearse alternative coping skills  3. Provide emotional support with 1:1 interaction with staff  Outcome:

## 2024-11-22 NOTE — DISCHARGE SUMMARY
Physical Medicine & Rehabilitation  Discharge Summary     Patient Identification:  Jm Snow  : 1995  Admit date: 2024  Discharge date: 2024  Attending provider: No att. providers found        Primary care provider: No primary care provider on file.     Discharge Diagnoses:   Patient Active Problem List   Diagnosis    Sepsis with acute hypoxic respiratory failure without septic shock (HCC)    Swelling of lower extremity    Cocaine abuse (HCC)    Overweight (BMI 25.0-29.9)    History of alcoholism (HCC)    Avascular necrosis of bone of hip    Multifocal pneumonia    Acute respiratory failure with hypoxia    Ileus (HCC)    Alcohol cessation counseling    Drug abuse counseling and surveillance of drug abuser    Pulmonary embolism (HCC)    Critical illness myopathy    Mild malnutrition (HCC)       Discharge Functional Status:      Physical therapy:  Supine to Sit: Independent  Sit to Supine: Independent      Sit to Stand: Supervision or touching assistance  Chair/Bed to Chair Transfer: Supervision or touching assistance  Car Transfer: Partial/moderate assistance     Ambulation 10 ft: Independent  Ambulation 50 ft: Independent  Ambulation 150 ft: Supervision or touching assistance  Stairs - 1 Step: Supervision or touching assistance  Stairs - 4 Step: Supervision or touching assistance  Stairs - 12 Step:      Occupational therapy:  Eating: Independent  Oral Hygiene: Independent  Bathing: Supervision or touching assistance  Upper Body Dressing: Independent  Lower Body Dressing: Supervision or touching assistance     Toilet Transfer: Independent  Toilet Hygiene: Supervision or touching assistance    Speech therapy:    Pt presents with functional cognitive linguistic skills, receptive and expressive language and motor speech. Pt with spastic movements but did not impact speech quality nor intelligibility. Pt with mild errors in short term (low context) memory tasks. He reported his anxiety often  was consulted over the weekend when KUB re-demonstrated these previously known findings. Reiterating need for outpatient follow-up with  Ortho.   - Multimodal pain management, as below     #. EtOH use disorder  - Out of withdrawal window  - Continue thiamine, multivitamin  - Counseling/education, community resources at discharge     #. Hypothyroidism  - TSH 12.3, T4 1.1 on 10/31  - Recheck as outpatient     #. HTN  #. Orthostatic hypotension  - Valsartan 80 mg daily     #. Anxiety  - Continue escitalopram 20 mg daily, and buspirone (increased to 10 TID on 11/9)      #. Left hallux and 4th toe skin necrosis  - Due to vasopressor induced ischemic injury  - Podiatry evaluated, recommended betadine paint daily.      #. Xerosis  - Aquaphor PRN     #. External hemorrhoids  - s/p Hydrocortisone cream BID x3 days (EOT 11/16)     #. TONYA, resolved  - Required intermittent HD at [facility]     #. Multifocal PNA  - Sepsis resolved  - Completed course of PO Levaquin, last dose 11/8     #. Lumbar spondylosis  - MRI L-Spine: Mild multilevel degenerative disease and facet arthrosis centered at L5-S1 with moderate spinal canal stenosis, moderate left and mild right neural foraminal narrowing at that level.   - Continue PT  - Pain management as below     CODE: Full Code  Diet: ADULT DIET; Regular  ADULT ORAL NUTRITION SUPPLEMENT; Breakfast, Lunch, Dinner; Standard High Calorie/High Protein Oral Supplement  Bowels: Fibercon BID, titrate up to 4x/day as needed for formed bowel movements.   Bladder: Per protocol   Sleep: Melatonin 3 mg nightly PRN, Trazodone 50 mg nightly PRN  Pain: Tylenol 650 mg q4h PRN, lidocaine patch PRN for back pain, gabapentin 400 mg TID  DVT PPx: Fully anticoagulated     Discharge Exam:  Vital signs:  Patient Vitals for the past 24 hrs:   BP Temp Temp src Pulse Resp SpO2 Weight   11/22/24 0845 115/69 98 °F (36.7 °C) Oral (!) 103 16 99 % --   11/22/24 0600 -- -- -- -- -- -- 111 kg (244 lb 12.8 oz)   11/21/24 2045

## 2024-11-22 NOTE — DISCHARGE INSTRUCTIONS
We hope your stay on rehab has exceeded your expectations. Once again the entire Acute Rehab Staff at [facility] wish to thank you for allowing us the privilege to care for you.         A few days after you are discharged from Rehab, you will receive a survey (Saad  Ganheriberto) in the mail or through email.  This is a nationally distributed survey sent to thousands of rehab patients throughout the nation.              It is very important to everyone on the rehab unit that we receive feedback based on your experience.          Thank you, we wish you good health always,         Acute Rehab Team      Hospital Preference:     __[facility]        Medical Diagnosis/Conditions    _______________________ (free text)    Emergency Contact:    ________________________________________Phone#________________________      Advanced Directives:    Code Status: ?  [x]  Full Code  ?  []  DNR  ? []  DNRCC  ? []  DNRCC - Arrest    (as of date of discharge:  _________)      Medical POA: ?  []   Yes ______________________________ ?  []   No                                       (Name and phone number)                     Living Will:   ?   []   Yes    ?  []   No        Insurance Information:    _______________________ (free text)      Individual Responsible  for the coordination of the discharge/follow up:    ______________________________________________________    Functional Status:    VISUAL DEFICITS:    Yes []  No  [x]       If yes, assisted device:   Wears Glasses Yes []  No  [x]  Wears Contacts  Yes []  No  [x]  Legally Blind Yes []  No  [x]    HEARING DEFICITS:    Yes []  No  [x]       If yes, assisted device:   Wears Hearing Aids Yes []  No  [x]  Pocket Talker  Yes []  No  [x]       Physical Therapist & Contact #: Chuy Wang PT, DPT - [phone]  OccupationalTherapist & Contact #: JOSE Pak OTR/L, [id number] [phone]  Speech Therapist & Contact #:       Activities of Daily Living:     ADL's -  Adaptive Equipment used grab bars, shower chair with back, bedside commode, handheld shower head, reacher, sock aid, long handled sponge     []  Independent ?  [x]  Modified Independent ?  []  Supervision                []  Minimal Assistance ? []  Moderate Assistance ? []  Maximal Assistance      Driving Restriction:      [x]  YES   [] NO Patient should contact primary care physician for referral for  evaluation prior to returning to driving.       Mobility:     Ambulation: Device: Rolling Walker     []  Independent ?  []  Modified Independent ?  [x]  Supervision                []  Minimal Assistance ? []  Moderate Assistance ? []  Maximal Assistance     Stairs:  Device: Rolling walker    Number of stairs: 1    Handrails:    [] Right   [] Left      [x] None   []  Independent ?  []  Modified Independent ?  []  Supervision                [x]  Minimal Assistance ? []  Moderate Assistance ? []  Maximal Assistance        Current Diet Consistency:?       [x]  Regular   [] Soft and Bite-Sized  ?[] Minced and Moist     ?[]  Puree     Current Liquid Level:?         [x] Thin Liquids     [] Mildly Thick (Nectar) ?    [] Moderately Thick (Honey)    [] Pudding Thick    [] Bell Water Protocol     Dietary Restrictions:?      [x]  General Diet   []  Tube Feed, w/ Diet   []  Tube Feed, NPO   []  Carb Control   []  Cardiac   []  Renal    []  Other:

## 2024-11-22 NOTE — PROGRESS NOTES
[facility] - Inpatient Rehabilitation Department   Phone: [phone]    Occupational Therapy    [] Initial Evaluation            [] Daily Treatment Note         [x] Discharge Summary      Patient: Jm Snow   : 1995   MRN: [id number]   Date of Service:  2024    Admitting Diagnosis:  Critical illness myopathy  Current Admission Summary:   Jm Snow is a 28 y.o. male with PMHx notable for EtOH use disorder, anxiety and HTN who presented to [facility] on  with sepsis on , found to have fungenemia of unclear source, possibly secondary to bilateral hip AVN with septic arthritis (however, left hip aspirate cultures with no growth).  He completed 7 days of empiric antibiotics.  Hospital course was complicated by severe TONYA acute renal failure due to ATN, requiring hemodialysis with now recovered renal function.  He additionally developed acute hypoxic respiratory failure secondary to ARDS and aspiration pneumonia requiring intubation on 10/12.  He was transferred to [facility] MICU on 10/12 for further management. Extubated on 10/18.  Hospital course was further complicated by encephalopathy with agitation and psychosis (episodes of hallucination), suspected multifactorial due to metabolic derangements, active infection and possible alcohol withdrawal, as well as ileus.      Developed tachycardia and fever shortly after ARU admission on 10/30, and was discharged acutely to internal medicine service in acute hospital for further workup and management early morning on 10/31.  He developed hypoxia, requiring intermittent high flow oxygen.  CTA chest showed left upper lobe PE, as well as bilateral pleural effusions and pneumonia.  He was restarted on broad-spectrum IV antibiotics, and ID was consulted. Blood cultures no growth to date. He is now completing course of PO Levaquin. He was started on heparin drip for PE, which has now been discontinued in favor of oral anticoagulation with  understanding. Additional fam ed regarding AE recommended at d/c - additional equipment such as shoe funnel discussed - verb understanding from both parties.         Cognition  WFL  Comments: patient with baseline anxiety   Orientation:    alert and oriented x 4  Command Following:   WFL     Education  Barriers To Learning: none  Patient Education: patient educated on goals, OT role and benefits, plan of care, ADL adaptive strategies, proper use of assistive device/equipment, transfer training, discharge recommendations  Learning Assessment:  patient verbalizes and demonstrates understanding    Assessment  Activity Tolerance: well   Impairments Requiring Therapeutic Intervention: decreased functional mobility, decreased ADL status, decreased strength, decreased endurance, decreased balance, decreased IADL, decreased coordination, decreased posture  Prognosis: good  Clinical Assessment: Pt has made good progress during ARU stay. Now Anthony w/ AE for LB ADL completion and Anthony for transfers to higher surfaces w/ B UE support. Family has received ed and has reported that they have purchased all DME and AE required for d/c. Recommend 24/7 SUP and physical assistance for IADL completion - and potential ADL completion including ADL based transfers upon d/c. Skilled HHOT warranted to cont' to increase pt IND and safety in all occupational pursuits as well as decrease caregiver burden. Con't per POC.   Safety Interventions: patient left in bed, bed alarm in place, call light within reach, patient at risk for falls, and nurse notified    Plan  Frequency: 5 x/week, 90 min/day  Current Treatment Recommendations: strengthening, balance training, functional mobility training, transfer training, endurance training, patient/caregiver education, ADL/self-care training, equipment evaluation/education, and positioning    Goals  Patient Goals: \"to be able to get up and walk\"    Short Term Goals:  Time Frame: 1-2 weeks   Patient will

## 2024-11-22 NOTE — DISCHARGE INSTR - COC
Continuity of Care Form    Patient Name: Jm Snow   :  1995  MRN:  [id number]    Admit date:  2024  Discharge date:  24    Code Status Order: Full Code   Advance Directives:   Advance Care Flowsheet Documentation             Admitting Physician:  Chinedu Rosas MD  PCP: No primary care provider on file.    Discharging Nurse: POOJA Lockwood  Discharging Hospital Unit/Room#: ARU-4905/4905-01  Discharging Unit Phone Number: [phone]    Emergency Contact:   Extended Emergency Contact Information  Primary Emergency Contact: Chuy Snow  Mobile Phone: [phone]  Relation: Parent  Secondary Emergency Contact: Sofya Snow  Mobile Phone: [phone]  Relation: Parent    Past Surgical History:  History reviewed. No pertinent surgical history.    Immunization History:   Immunization History   Administered Date(s) Administered    COVID-19, PFIZER PURPLE top, DILUTE for use, (age 12 y+), 30mcg/0.3mL 2021, 2021       Active Problems:  Patient Active Problem List   Diagnosis Code    Sepsis with acute hypoxic respiratory failure without septic shock (HCC) A41.9, R65.20, J96.01    Swelling of lower extremity M79.89    Cocaine abuse (HCC) F14.10    Overweight (BMI 25.0-29.9) E66.3    History of alcoholism ([facility]) F10.21    Avascular necrosis of bone of hip M87.059    Multifocal pneumonia J18.9    Acute respiratory failure with hypoxia J96.01    Ileus ([facility]) K56.7    Alcohol cessation counseling Z71.41    Drug abuse counseling and surveillance of drug abuser Z71.51    Pulmonary embolism ([facility]) I26.99    Critical illness myopathy G72.81    Mild malnutrition ([facility]) E44.1       Isolation/Infection:   Isolation            No Isolation          Patient Infection Status       None to display                     Nurse Assessment:  Last Vital Signs: /74   Pulse (!) 102   Temp 98 °F (36.7 °C) (Oral)   Resp 18   Ht 1.93 m (6' 3.98\")   Wt 111 kg (244 lb 12.8 oz)   SpO2 94%   BMI 29.81 kg/m²     Last

## 2024-11-22 NOTE — PLAN OF CARE
Problem: Discharge Planning  Goal: Discharge to home or other facility with appropriate resources  11/21/2024 2208 by Michelle Benz RN  Outcome: Progressing     Problem: Safety - Adult  Goal: Free from fall injury  11/21/2024 2208 by Michelle Benz RN  Outcome: Progressing     Problem: Pain  Goal: Verbalizes/displays adequate comfort level or baseline comfort level  11/21/2024 2208 by Michelle Benz RN  Outcome: Progressing     Problem: Nutrition Deficit:  Goal: Optimize nutritional status  11/21/2024 2208 by Michelle Benz RN  Outcome: Progressing     Problem: Skin/Tissue Integrity  Goal: Absence of new skin breakdown  Description: 1.  Monitor for areas of redness and/or skin breakdown  2.  Assess vascular access sites hourly  3.  Every 4-6 hours minimum:  Change oxygen saturation probe site  4.  Every 4-6 hours:  If on nasal continuous positive airway pressure, respiratory therapy assess nares and determine need for appliance change or resting period.  11/21/2024 2208 by Michelle Benz RN  Outcome: Progressing     Problem: ABCDS Injury Assessment  Goal: Absence of physical injury  11/21/2024 2208 by Michelle Benz RN  Outcome: Progressing  Flowsheets (Taken 11/21/2024 2206)  Absence of Physical Injury: Implement safety measures based on patient assessment     Problem: Neurosensory - Adult  Goal: Achieves stable or improved neurological status  11/21/2024 2208 by Michelle Benz RN  Outcome: Progressing     Problem: Cardiovascular - Adult  Goal: Maintains optimal cardiac output and hemodynamic stability  11/21/2024 2208 by Michelle Benz RN  Outcome: Progressing     Problem: Skin/Tissue Integrity - Adult  Goal: Skin integrity remains intact  Outcome: Progressing  Flowsheets (Taken 11/21/2024 2206)  Skin Integrity Remains Intact: Monitor for areas of redness and/or skin breakdown     Problem: Musculoskeletal - Adult  Goal: Return mobility to safest level of

## 2024-11-22 NOTE — PROGRESS NOTES
to pain?\"  []  0.  Does not apply - I have not received rehabilitation therapy in the past 5 days  []  1.  Rarely or not at all  []  2.  Occasionally  []  3.  Frequently  []  4.  Almost constantly  []  8.  Unable to answer    Pain Interference with Day-to-Day Activities:  \"Over the past 5 days, how often have you limited your day-to-day activities (excluding rehabilitation therapy session)?\"  []  1.  Rarely or not at all  []  2.  Occasionally  []  3.  Frequently  []  4.  Almost constantly  []  8.  Unable to answer    Nutritional Approaches  Last 7 Days - Check all of the following nutritional approaches that were received within the last 7 days:  []  A.  Parenteral/IV feeding  []  B.  Feeding tube (e.g., nasogastric or abdominal (PEG))  []  C.  Mechanically altered diet - requires change in texture of food or liquids (e.g., pureed food, thickened liquids)  []  D.  Therapeutic diet (e.g., low salt, diabetic, low cholesterol)  [x]  Z.  None of the above    At time of Discharge - Check all of the following nutritional approaches that were received within the last 3 days of the patient's stay, including the day of discharge:  []  A.  Parenteral/IV feeding (including IV fluids if needed for hydration, but not as part of dialysis/chemo)  []  B.  Feeding tube (e.g., nasogastric or abdominal (PEG))  []  C.  Mechanically altered diet - requires change in texture of food or liquids (e.g., pureed food, thickened liquids)  []  D.  Therapeutic diet (e.g., low salt, diabetic, low cholesterol  [x]  Z.  None of the above    High Risk Drug Classes:  Use and Indication (THROUGHOUT LAST 3 DAYS OF STAY)    Is taking: Check if the pt is taking any medications by pharmacological classification, not how it is used, in the following classes  Indication noted: If column 1 is checked, check if there is an indication noted for all meds in the drug class Is taking  (check all that apply) Indication noted (check all that apply)   Antipsychotic  [] []   Anticoagulant [x] [x]   Antibiotic [] []   Opioid [] []   Antiplatelet [] []   Hypoglycemic (including insulin) [] []   None of the above []     COVID-19 Vaccination Status:    Is the patient's COVID-19 vaccination status up to date? No, patient is not up to date        Yes, patient is up to date [x]    []             A patient is up to date on their COVID-19 Vaccine if they have received:  1 dose of the [id number] Moderna COVID-19 vaccine OR  1 dose of the [id number] Pfizer-BioNTech COVID-19 vaccine   If not up to date, the patient was offered an up to date COVID-19 vaccine and: Patient declined             Patient requested vaccine, Physician notified        Patient unable to respond    Vaccine currently unavailable, patient directed to PCP/Outpatient Pharmacy to receive vaccine           []    []      []    [x]     Special Treatments, Procedures, and Programs (THROUGHOUT LAST 3 DAYS OF STAY)    Check all of the following treatments, procedures, and programs that apply at discharge. At Discharge (check all that apply)   Cancer Treatments   A1. Chemotherapy []           A2. IV []           A3. Oral []           A10. Other []   B1. Radiation []   Respiratory Therapies   C1. Oxygen Therapy []           C2. Continuous (continuously for at least 14 hours per day) []           C3. Intermittent []           C4. High-concentration []   D1. Suctioning (Does not include oral suctioning) []           D2. Scheduled []           D3. As needed []   E1. Tracheostomy Care []   F1. Invasive Mechanical Ventilator (ventilator or respirator) []   G1. Non-invasive Mechanical Ventilator []           G2. BiPAP []           G3. CPAP []   Other   H1. IV Medications (Do not include sub Q pumps, flushes, Dextrose 50% or lactated ringers) []           H2. Vasoactive medications []           H3. Antibiotics []           H4. Anticoagulation []           H10. Other []   I1. Transfusions []   J1. Dialysis []           J2. Hemodialysis []

## 2024-11-25 ENCOUNTER — CLINICAL DOCUMENTATION (OUTPATIENT)
Dept: PHYSICAL THERAPY | Age: 29
End: 2024-11-25

## 2024-11-25 LAB — FUNGUS BLD CULT: NORMAL

## 2024-12-02 LAB — FUNGUS BLD CULT: NORMAL
